# Patient Record
Sex: MALE | Race: WHITE | Employment: OTHER | URBAN - METROPOLITAN AREA
[De-identification: names, ages, dates, MRNs, and addresses within clinical notes are randomized per-mention and may not be internally consistent; named-entity substitution may affect disease eponyms.]

---

## 2016-01-05 LAB — HBA1C MFR BLD: 8.2 % (ref 0–5.7)

## 2017-01-01 ENCOUNTER — TELEPHONE (OUTPATIENT)
Dept: PALLIATIVE MEDICINE | Facility: CLINIC | Age: 62
End: 2017-01-01

## 2017-01-02 NOTE — TELEPHONE ENCOUNTER
Pre-screening questions for Radiology Injections:    Injection to be done at which interventional clinic site? [facility]    Procedure ordered by Dr. ABELARDO PATEL    Procedure ordered? Lumbar Epidural Steroid Injection    What insurance would patient like us to bill for this procedure? MEDICARE/MEDICA      Worker's comp-Any injection DO NOT SCHEDULE and route to Ita Erickson.      [employer] insurance - If scheduling an SI joint injection DO NOT SCHEDULE and route Ita Erickson.    [employer]- MBB's must be scheduled at LEAST two weeks apart      [employer] - Any injection besides hip/shoulder/knee joint DO NOT SCHEDULE and route to Ita Erickson. She will obtain PA and call pt back to schedule procedure or notify pt of denial.     Is an  needed? Yes -      Patient has a drive home? (mandatory) YES:      Is patient taking any blood thinners (plavix, coumadin, jantoven, warfarin, heparin, pradaxa or dabigatran )? No   (If so, do not schedule, contact RN and/or MD)     Is patient taking any aspirin products? No   (If more than 325mg/day do not schedule; Contact RN/MD. For all non-cervical interventional procedures if patient is taking MORE than 325mg/day, limit aspirin to 81-325mg/day x 1 week. No hold required day of procedure.  For CERVICAL procedures, hold all aspirin products for 6 days.)      Does the patient have a bleeding or clotting disorder? No   (If yes, okay to schedule, but contact RN/MD).  **For any patients with platelet count <100, must be forwarded to provider**    Is patient diabetic?  YES  If YES, have them bring their glucometer.    Does patient have an active infection or treated for one within the past week? No     Is patient currently taking any antibiotics?  No   For patients on chronic, preventative, or prophylactic antibiotics, procedures can be scheduled.   For patients on antibiotics for active or recent infection:  Malgorzata Mckenzie,  Kamla-antibiotic course must have been completed for 4 days  Derrell Sharma-antibiotic course must have been completed for 7 days    Is patient currently taking any steroid medications? (i.e. Prednisone, Medrol)  No   For patients on steroid medications:  Malgorzata Mckenzie, Kamla-steroid course must have been completed for 4 days  Derrell Sharma-steroid course must have been completed for 7 days  Review with patient:  If you are started on any steroids or antibiotics between now and your appointment, you must contact us because it may affect our ability to perform your procedure INFORMED    Is patient actively being treated for cancer or immunocompromised, including the spleen having been removed? No  **For Dr. Alves patients without spleens should have the chart sent to her**  (If YES, do NOT schedule and route to RN)    Are you able to get on and off an exam table with minimal or no assistance? Yes  (If NO, do NOT schedule and route to RN)  Are you able to roll over and lay on your stomach with minimal or no assistance? Yes  (If NO, do NOT schedule and route to RN)         Any allergies to contrast dye, iodine, shellfish, or numbing and steroid medications? No  (If so, inform nursing and note in scheduling comments.)    Allergies: Asa      Any chance of pregnancy?NO    Has the patient had a flu shot or any other vaccinations within 7 days before or after the procedure.  No       Does patient have an MRI/CT?  YES:   (SI joint, hip injections, lumbar sympathetic blocks, and stellate ganglion blocks do not require an MRI)    If so, was it done at [facility]? No      If not, where was it done? [facility], CANNOT REMEMBER NAME OF CLINIC - HAS MRI DISC WILL BRING WITH TO APPT     Was the MRI done w/in the last 3 years?  Yes   If MRI was not done at [facility], [facility] or [facility] do NOT schedule. Route to nursing.  (If pt has disc the injection can be scheduled but pt has to bring disc to appt. If  they show up w/out disc the injection cannot be done)    Reminders (please tell patient if applicable):       Instructed pt to arrive 30 minutes early for IV start if this is for a cervical procedure, ALL sympathetic (stellate ganglion, hypogastric, or lumbar sympathetic block) and all sedation procedures (RFA, spinal cord stimulation trials).      -IVs are not routinely placed for Mcgarry and [place] cervical case       If NPO for sedation, informed patient that it is okay to take medications with sips of water (except if they are to hold blood thinners).     *DO take blood pressure medication if it is prescribed*      If this is for a cervical SARA, informed patient that aspirin needs to be held for 6 days.         Do not schedule procedures requiring IV placement in the first appointment of the day or first appointment after lunch         For patients 85 or older we recommend having an adult stay w/ them for the remainder of the day.         Does the patient have any questions?        Patience Bush  [facility]

## 2017-01-03 ENCOUNTER — MEDICAL CORRESPONDENCE (OUTPATIENT)
Dept: HEALTH INFORMATION MANAGEMENT | Facility: CLINIC | Age: 62
End: 2017-01-03

## 2017-01-04 ENCOUNTER — OFFICE VISIT (OUTPATIENT)
Dept: FAMILY MEDICINE | Facility: CLINIC | Age: 62
End: 2017-01-04
Payer: MEDICARE

## 2017-01-04 VITALS
DIASTOLIC BLOOD PRESSURE: 72 MMHG | BODY MASS INDEX: 36.94 KG/M2 | OXYGEN SATURATION: 96 % | TEMPERATURE: 97.6 F | WEIGHT: 258 LBS | HEART RATE: 72 BPM | RESPIRATION RATE: 16 BRPM | HEIGHT: 70 IN | SYSTOLIC BLOOD PRESSURE: 136 MMHG

## 2017-01-04 DIAGNOSIS — N18.30 TYPE 2 DIABETES MELLITUS WITH STAGE 3 CHRONIC KIDNEY DISEASE, WITH LONG-TERM CURRENT USE OF INSULIN (H): ICD-10-CM

## 2017-01-04 DIAGNOSIS — M54.16 LUMBAR RADICULOPATHY: ICD-10-CM

## 2017-01-04 DIAGNOSIS — Z79.4 TYPE 2 DIABETES MELLITUS WITH STAGE 3 CHRONIC KIDNEY DISEASE, WITH LONG-TERM CURRENT USE OF INSULIN (H): ICD-10-CM

## 2017-01-04 DIAGNOSIS — M47.897 OTHER SPONDYLOSIS, LUMBOSACRAL REGION: ICD-10-CM

## 2017-01-04 DIAGNOSIS — E78.5 HYPERLIPIDEMIA LDL GOAL <100: ICD-10-CM

## 2017-01-04 DIAGNOSIS — G89.4 CHRONIC PAIN SYNDROME: Primary | ICD-10-CM

## 2017-01-04 DIAGNOSIS — M51.369 DDD (DEGENERATIVE DISC DISEASE), LUMBAR: ICD-10-CM

## 2017-01-04 DIAGNOSIS — E11.22 TYPE 2 DIABETES MELLITUS WITH STAGE 3 CHRONIC KIDNEY DISEASE, WITH LONG-TERM CURRENT USE OF INSULIN (H): ICD-10-CM

## 2017-01-04 DIAGNOSIS — M48.061 LUMBAR STENOSIS: ICD-10-CM

## 2017-01-04 DIAGNOSIS — Z11.59 NEED FOR HEPATITIS C SCREENING TEST: ICD-10-CM

## 2017-01-04 DIAGNOSIS — E55.9 VITAMIN D DEFICIENCY: ICD-10-CM

## 2017-01-04 DIAGNOSIS — M47.819 FACET ARTHROPATHY: ICD-10-CM

## 2017-01-04 PROBLEM — F15.10 METHAMPHETAMINE ABUSE (H): Status: ACTIVE | Noted: 2017-01-04

## 2017-01-04 PROBLEM — M47.816 OSTEOARTHRITIS OF LUMBAR SPINE, UNSPECIFIED SPINAL OSTEOARTHRITIS COMPLICATION STATUS: Status: ACTIVE | Noted: 2017-01-04

## 2017-01-04 PROBLEM — M47.27 OSTEOARTHRITIS OF SPINE WITH RADICULOPATHY, LUMBOSACRAL REGION: Status: ACTIVE | Noted: 2017-01-04

## 2017-01-04 LAB
AMPHETAMINES UR QL: ABNORMAL
BARBITURATES UR QL: ABNORMAL
BENZODIAZ UR QL: ABNORMAL
CANNABINOIDS UR QL: ABNORMAL
CHOLEST SERPL-MCNC: 162 MG/DL
COCAINE UR QL: ABNORMAL
DEPRECATED CALCIDIOL+CALCIFEROL SERPL-MC: 54 UG/L (ref 20–75)
HBA1C MFR BLD: 7.8 % (ref 4.3–6)
HCV AB SERPL QL IA: NORMAL
HDLC SERPL-MCNC: 50 MG/DL
LDLC SERPL CALC-MCNC: 73 MG/DL
MDA UR QL SCN: ABNORMAL
METHADONE UR QL SCN: ABNORMAL
METHAMPHET UR QL SCN: ABNORMAL
NONHDLC SERPL-MCNC: 112 MG/DL
OPIATES UR QL SCN: ABNORMAL
OXYCODONE UR QL: ABNORMAL
PCP UR QL SCN: ABNORMAL
TRICYCLICS UR QL SCN: ABNORMAL
TRIGL SERPL-MCNC: 197 MG/DL

## 2017-01-04 PROCEDURE — 80061 LIPID PANEL: CPT | Performed by: FAMILY MEDICINE

## 2017-01-04 PROCEDURE — 99000 SPECIMEN HANDLING OFFICE-LAB: CPT | Performed by: FAMILY MEDICINE

## 2017-01-04 PROCEDURE — 80307 DRUG TEST PRSMV CHEM ANLYZR: CPT | Mod: 90 | Performed by: FAMILY MEDICINE

## 2017-01-04 PROCEDURE — 36415 COLL VENOUS BLD VENIPUNCTURE: CPT | Performed by: FAMILY MEDICINE

## 2017-01-04 PROCEDURE — 82306 VITAMIN D 25 HYDROXY: CPT | Performed by: FAMILY MEDICINE

## 2017-01-04 PROCEDURE — 86803 HEPATITIS C AB TEST: CPT | Performed by: FAMILY MEDICINE

## 2017-01-04 PROCEDURE — 99214 OFFICE O/P EST MOD 30 MIN: CPT | Performed by: FAMILY MEDICINE

## 2017-01-04 PROCEDURE — 83036 HEMOGLOBIN GLYCOSYLATED A1C: CPT | Performed by: FAMILY MEDICINE

## 2017-01-04 RX ORDER — CYCLOBENZAPRINE HCL 10 MG
5-10 TABLET ORAL 3 TIMES DAILY PRN
Qty: 30 TABLET | Refills: 1 | Status: SHIPPED | OUTPATIENT
Start: 2017-01-04 | End: 2017-05-31

## 2017-01-04 RX ORDER — TRAMADOL HYDROCHLORIDE 50 MG/1
50-100 TABLET ORAL EVERY 6 HOURS PRN
Qty: 120 TABLET | Refills: 0 | Status: SHIPPED | OUTPATIENT
Start: 2017-01-04 | End: 2017-02-02

## 2017-01-04 RX ORDER — LIDOCAINE/PRILOCAINE 2.5 %-2.5%
CREAM (GRAM) TOPICAL PRN
Qty: 5800 G | Refills: 1 | Status: SHIPPED | OUTPATIENT
Start: 2017-01-04 | End: 2017-09-12

## 2017-01-04 RX ORDER — TRAMADOL HYDROCHLORIDE 50 MG/1
50-100 TABLET ORAL EVERY 6 HOURS PRN
Qty: 120 TABLET | Refills: 0 | Status: SHIPPED | OUTPATIENT
Start: 2017-01-04 | End: 2017-01-04

## 2017-01-04 RX ORDER — PREGABALIN 150 MG/1
150 CAPSULE ORAL 2 TIMES DAILY
Qty: 60 CAPSULE | Refills: 1 | Status: SHIPPED | OUTPATIENT
Start: 2017-01-04 | End: 2017-03-15

## 2017-01-04 RX ORDER — TRAMADOL HYDROCHLORIDE 50 MG/1
50 TABLET ORAL EVERY 6 HOURS PRN
Qty: 28 TABLET | Refills: 0 | Status: CANCELLED | OUTPATIENT
Start: 2017-01-04

## 2017-01-04 RX ORDER — GLIMEPIRIDE 2 MG/1
TABLET ORAL
Qty: 135 TABLET | Refills: 1 | Status: SHIPPED | OUTPATIENT
Start: 2017-01-04 | End: 2017-01-24

## 2017-01-04 RX ORDER — ATORVASTATIN CALCIUM 10 MG/1
10 TABLET, FILM COATED ORAL DAILY
Qty: 90 TABLET | Refills: 3 | Status: SHIPPED | OUTPATIENT
Start: 2017-01-04 | End: 2017-03-23

## 2017-01-04 RX ORDER — ATORVASTATIN CALCIUM 10 MG/1
10 TABLET, FILM COATED ORAL DAILY
Qty: 30 TABLET | Refills: 1 | Status: SHIPPED | OUTPATIENT
Start: 2017-01-04 | End: 2017-01-04

## 2017-01-04 ASSESSMENT — ANXIETY QUESTIONNAIRES
5. BEING SO RESTLESS THAT IT IS HARD TO SIT STILL: NOT AT ALL
3. WORRYING TOO MUCH ABOUT DIFFERENT THINGS: SEVERAL DAYS
IF YOU CHECKED OFF ANY PROBLEMS ON THIS QUESTIONNAIRE, HOW DIFFICULT HAVE THESE PROBLEMS MADE IT FOR YOU TO DO YOUR WORK, TAKE CARE OF THINGS AT HOME, OR GET ALONG WITH OTHER PEOPLE: NOT DIFFICULT AT ALL
2. NOT BEING ABLE TO STOP OR CONTROL WORRYING: NOT AT ALL
7. FEELING AFRAID AS IF SOMETHING AWFUL MIGHT HAPPEN: NOT AT ALL
GAD7 TOTAL SCORE: 2
1. FEELING NERVOUS, ANXIOUS, OR ON EDGE: SEVERAL DAYS
6. BECOMING EASILY ANNOYED OR IRRITABLE: NOT AT ALL

## 2017-01-04 ASSESSMENT — PATIENT HEALTH QUESTIONNAIRE - PHQ9: 5. POOR APPETITE OR OVEREATING: NOT AT ALL

## 2017-01-04 NOTE — PATIENT INSTRUCTIONS
[facility]    If you have any questions regarding to your visit please contact your care team:       Team Red:   Clinic Hours Telephone Number   Dr. Twyla Lucio  (pediatrics)  Paula Hutson NP 7am-7pm  Monday - Thursday   7am-5pm  Fridays  [phone] (fax)    Luis Alberto KAUR  [phone]   Urgent Care - [place] and [place] Monday-Friday  [place] - 11am-8pm  Saturday-Sunday  Both sites - 9am-5pm  [phone] - [facility]  [phone] - [place]       What options do I have for visits at the clinic other than the traditional office visit?  To expand how we care for you, many of our providers are utilizing electronic visits (e-visits) and telephone visits, when medically appropriate, for interactions with their patients rather than a visit in the clinic.   We also offer nurse visits for many medical concerns. Just like any other service, we will bill your insurance company for this type of visit based on time spent on the phone with your provider. Not all insurance companies cover these visits. Please check with your medical insurance if this type of visit is covered. You will be responsible for any charges that are not paid by your insurance.      E-visits via [employer]:  generally incur a $35.00 fee.  Telephone visits:  Time spent on the phone: *charged based on time that is spent on the phone in increments of 10 minutes. Estimated cost:   5-10 mins $30.00   11-20 mins. $59.00   21-30 mins. $85.00     As always, Thank you for trusting us with your health care needs!        Discharge JANELL Todd CMA

## 2017-01-04 NOTE — Clinical Note
[facility]    01/04/2017    Patient: Sandra Dos Santos  YOB: 1955  Medical Record Number: [id number]    Controlled Substance Agreement  I understand that my care provider has prescribed controlled substances (narcotics, tranquilizers, and/or stimulants) to help manage my condition(s).  I am taking this medicine to help me function or work.  I know that this is strong medicine.  It could have serious side effects and even cause a dependency on the drug.  If I stop these medicines suddenly, I could have severe withdrawal symptoms.    The risks, benefits, and side effects of these medication(s) were explained to me.  I agree that:  1. I will take part in other treatments as advised by my provider.  This may be psychiatry or counseling, physical therapy, behavioral therapy, group treatment, or a referral to a pain clinic.  I will reduce or stop my medicine when my provider tells me to do so.   2. I will take my medicines as prescribed.  I will not change the dose or schedule unless my provider tells me to.  There will be no refills if I  run out early.   I may be contacted at any time without warning and asked to complete a drug test or pill count.   3. I will keep all my appointments at the clinic.  If I miss appointments or fail to follow instructions, my provider may stop my medicine.  4. I will not ask other providers to prescribe controlled substances. And I will not accept controlled substances from other people. If I need another prescribed controlled substance for a new reason, I will notify my provider within one business day.  5. If I enroll in the [place] Medical Marijuana program, I will tell my provider.  I will also sign an agreement to share my medical records with my provider.  6. I will use one pharmacy to fill all of my controlled substance prescriptions.  If my prescription is mailed to my pharmacy, it may take 5 to 7 days for my medicine to be ready.  7. I understand that  my provider, clinic care team, and pharmacy can track controlled substance prescriptions from other providers through a central database (prescription monitoring program).  8. I will bring in my list of medications (or my medicine bottles) each time I come to the clinic.  Page 1 of 2      [facility]    01/04/2017    Patient: Sandra Dos Santos  YOB: 1955  Medical Record Number: [id number]    9. Refills of controlled substances will be made only during office hours.  It is up to me to make sure that I do not run out of my medicines on weekends or holidays.    10. I am responsible for my prescriptions.  If the medicine is lost or stolen, it will not be replaced.   I also agree not to share these medicines with anyone.  11. I agree to not use ANY illegal or recreational drugs.  This includes marijuana, cocaine, bath salts or other drugs.  I agree not to use alcohol unless my provider says I may.  I agree to give urine samples whenever asked.  If I fail to give a urine sample, the provider may stop my medicine.     12. I will tell my nurse or provider right away if I become pregnant or have a new medical problem treated outside of [facility].  13. I understand that this medicine can affect my thinking and judgment.  It may be unsafe for me to drive, use machinery and do dangerous tasks.  I will not do any of these things until I know how the medicine affects me.  If my dose changes, I will wait to see how it affects me.  I will contact my provider if I have concerns about medicine side effects.  I understand that if I do not follow any of the conditions above, my prescriptions or treatment may be stopped.    I agree that my provider, clinic care team, and pharmacy may work with any city, state or federal law enforcement agency that investigates the misuse, sale, or other diversion of my controlled medicine. I will allow my provider to discuss my care with or share a copy of this agreement with  any other treating provider, pharmacy or emergency room where I receive care.  I agree to give up (waive) any right of privacy or confidentiality with respect to these authorizations.   Pt will get 120 tablets Ultram /month  I have read this agreement and have asked questions about anything I did not understand.   ___________________________________    ___________________________  Patient Signature                                                             Date and Time  ___________________________________     ____________________________  Witness                                                                              Date and Time  ___________________________________  Deborah Leslie MD  Page 2 of 2  Opioid Pain Medicines (also known as Narcotics)  What You Need to Know      What are opioids?   Opioids are pain medicines that must be prescribed by a doctor. Examples are:     morphine (MS Contin, Nicolle)    oxycodone (Oxycontin)    oxycodone and acetaminophen (Percocet)    hydrocodone and acetaminophen (Vicodin, Norco)     fentanyl patch (Duragesic)     hydromorphone (Dilaudid)     methadone     What do opioids do well?   Opioids are best for short-term pain after a surgery or injury. They also work well for cancer pain. Unlike other pain medicines, they do not cause liver or kidney failure or ulcers. They may help some people with long-lasting (chronic) pain.     What do opioids NOT do well?   Opioids never get rid of pain entirely, and they do not work well for most patients with chronic pain. Opioids do not reduce swelling, one of the causes of pain. They also don t work well for nerve pain.     Side effects  Talk to your doctor before you start or decide to keep taking one of these medicines. Side effects include:    Lowers your breathing rate enough that it could cause death    Death due to taking more than the prescribed dose    Serious lifelong opioid use      Dependence is not the same as addiction.  Addiction is when people keep using a substance that harms their body, their mind or their relations with others. If you have a history of drug or alcohol abuse, taking opioids can cause a relapse.  Over time, opioids don t work as well. Most people will need higher and higher doses. The higher the dose, the more serious the side effects. We don t know the long-term effects of opioids.   People who have used opioids for a long time have a lower quality of life, worse depression, higher levels of pain and more visits to doctors.  Overdose from prescription drugs is the second leading cause of death in the [place] The risk of overdose rises when opioids are taken with other drugs such as:    Medicines used for anxiety and panic attacks (such as lorazepam, alprazolam, clonazepam    Other sedatives    Alcohol    Illegal drugs such as heroin  Never share your opioids with others. Be sure to store opioids in a secure place, locked if possible.Young children can easily swallow them and overdose.     Are there other ways to manage pain?  Ways to help reduce pain:    Exercise every day.    Treat health problems that may be causing pain.    Treat mental health problems like depression and anxiety.     Worse depression symptoms; Less pleasure in things you usually enjoy    Feeling tired or sluggish    Slower thoughts or cloudy thinking    Being more sensitive to pain over time; Pain is harder to control.    Trouble sleeping or restless sleep    Changes in hormone levels (for example, less testosterone).     Changes in sex drive or ability to have sex    Long lasting nausea and constipation    Trouble breathing while asleep; This is worse with lung problems like COPD or sleep apnea.    Unsafe driving    Getting sick more often    Itching    Feeling dizzy    Dry mouth    Sweating    Trouble emptying the bladder (peeing). This is worse if you have an enlarged prostate or get urinary tract infections (UTIs).    What else should I  know about opioids?  When someone takes opioids for too long or too often, they become dependent. This means that if you stop or reduce the medicine too quickly, you will have withdrawal symptoms.          Practice good sleep habits.  Try to go to bed and get up at the same time every day.    Stop smoking.  Tobacco use can make pain worse.    Do things that you enjoy.    Find a way to work through pain without drugs.  Try deep breathing, meditation, visual imagery and aromatherapy.    Ask your doctor to help you create a plan to manage your pain.

## 2017-01-04 NOTE — NURSING NOTE
"Chief Complaint   Patient presents with     Recheck Medication       Initial /72 mmHg  Pulse 72  Temp(Src) 97.6  F (36.4  C)  Resp 16  Ht 5' 9.5\" (1.765 m)  Wt 258 lb (117.028 kg)  BMI 37.57 kg/m2  SpO2 96% Estimated body mass index is 37.57 kg/(m^2) as calculated from the following:    Height as of this encounter: 5' 9.5\" (1.765 m).    Weight as of this encounter: 258 lb (117.028 kg).  BP completed using cuff size: large left arm    Judi Todd. MA      "

## 2017-01-04 NOTE — Clinical Note
[address] [place] [address]    January 9, 2017    Sandra Dos Santos  [address] [place] [id number]          Dear Sandra,    The toxicology screen is normal for the medication your are taking.    Enclosed is a copy of your results.     Results for orders placed or performed in visit on 01/04/17   Hepatitis C Screen Reflex to HCV RNA Quant and Genotype   Result Value Ref Range    Hepatitis C Antibody  NR     Nonreactive   Assay performance characteristics have not been established for newborns,   infants, and children     Hemoglobin A1c   Result Value Ref Range    Hemoglobin A1C 7.8 (H) 4.3 - 6.0 %   Drug abuse screen, urine (Pain Care Package)   Result Value Ref Range    Methamphetamine Qual Urine (A) NEG     Positive   Cutoff for a positive methamphetamine is greater than 1000 ng/mL. This is an   unconfirmed screening result to be used for medical purposes only.      Cocaine Qual Urine  NEG     Negative   Cutoff for a negative cocaine is 300 ng/mL or less.      Cannabinoids Qual Urine  NEG     Negative   Cutoff for a negative cannabinoid is 50 ng/mL or less.      MDMA Qual Urine  NEG     Negative   Cutoff for a negative MDMA (ecstasy) is 500 ng/mL or less.      Methadone Qual Urine  NEG     Negative   Cutoff for a negative methadone is 300 ng/mL or less.      Opiates Qualitative Urine  NEG     Negative   Cutoff for a negative opiate is 300 ng/mL or less.      Benzodiazepine Qual Urine  NEG     Negative   Cutoff for a negative benzodiazepine is 300 ng/mL or less.      Tricyc Anti Qual Urine  NEG     Negative   Cutoff for a negative tricyclic antidepressant is 1000 ng/mL or less.      Barbiturates Qual Urine  NEG     Negative   Cutoff for a negative barbituate is 300 ng/mL or less.      PCP Qual Urine  NEG     Negative   Cutoff for a negative PCP is 25 ng/mL or less.      Amphetamine Qual Urine  NEG      Negative   Cutoff for a negative amphetamine is 1000 ng/mL or less.      Oxycodone Qual Urine  NEG     Negative   Cutoff for a negative Oxycodone is 100 ng/mL or less.     Lipid panel reflex to direct LDL   Result Value Ref Range    Cholesterol 162 <200 mg/dL    Triglycerides 197 (H) <150 mg/dL    HDL Cholesterol 50 >39 mg/dL    LDL Cholesterol Calculated 73 <100 mg/dL    Non HDL Cholesterol 112 <130 mg/dL   Vitamin D Deficiency   Result Value Ref Range    Vitamin D Deficiency screening 54 20 - 75 ug/L   Drug Screen Comprehensive , Urine with Reported Meds (Pain Care Package)   Result Value Ref Range    Pain Drug SCR UR W RPTD Meds       FINAL  (Note)  ====================================================================  [facility] COMP DRUG ANALYSIS,UR  ====================================================================  Test                             Result       Flag       Units  Drug Present and Declared for Prescription Verification   Tramadol                       PRESENT      EXPECTED   O-Desmethyltramadol            PRESENT      EXPECTED   N-Desmethyltramadol            PRESENT      EXPECTED    Source of tramadol is a prescription medication.    O-desmethyltramadol and N-desmethyltramadol are expected    metabolites of tramadol.      Drug Present not Declared for Prescription Verification   Pregabalin                     PRESENT      UNEXPECTED   Lidocaine                      PRESENT      UNEXPECTED  ====================================================================  Test                      Result    Flag   Units      Ref Range   Creatinine              143              mg/dL      >=20  ================= ===================================================  Declared Medications:  The flagging and interpretation on this report are based on the  following declared medications.  Unexpected results may arise from  inaccuracies in the declared medications.    **Note: The testing scope of this panel  includes these medications:    Tramadol  ====================================================================  For clinical consultation, please call [phone].  ====================================================================  Analysis performed by [facility] [place] [address]         If you have any questions or concerns, please call myself or my nurse at [phone].      Sincerely,        Deborah Leslie MD/ ARTURO

## 2017-01-04 NOTE — PROGRESS NOTES
SUBJECTIVE:                                                    Sandra Dos Santos is a 61 year old male who presents to clinic today for the following health issues:        Diabetes Follow-up    Patient is checking blood sugars: three times daily. Results are as follows:         am - 114              postprandial after lunch- varies         suppertime - varies    Diabetic concerns: None     Symptoms of hypoglycemia (low blood sugar): none     Paresthesias (numbness or burning in feet) or sores: Yes      Date of last diabetic eye exam: 2016     Hyperlipidemia Follow-Up      Rate your low fat/cholesterol diet?: good    Taking statin?  Yes, no muscle aches from statin    Other lipid medications/supplements?:  none     Hypertension Follow-up      Outpatient blood pressures are not being checked.    Low Salt Diet: no added salt         Amount of exercise or physical activity: None    Problems taking medications regularly: No    Medication side effects: see above    Diet: diabetic      Chronic Pain Follow-Up       Type / Location of Pain: lower back  Analgesia/pain control:       Recent changes:  worse      Overall control: Tolerable with discomfort  Activity level/function:      Daily activities:  Can do most things most days, with some rest    Work:  Unable to work  Adverse effects:  No  Adherance    Taking medication as directed?  Yes    Participating in other treatments: yes  Risk Factors:    Sleep:  Fair    Mood/anxiety:  controlled    Recent family or social stressors:  none noted    Other aggravating factors: none  PHQ-9 SCORE 9/15/2010 12/30/2016   Total Score 11 -   Total Score - 0     No flowsheet data found.  Encounter-Level CSA:     There are no encounter-level csa.             Problem list and histories reviewed & adjusted, as indicated.  Additional history: as documented    Patient Active Problem List   Diagnosis     HYPERLIPIDEMIA LDL GOAL <100     Chronic kidney disease, stage III (moderate)      Gastroesophageal reflux disease     Hypothyroidism     Urinary hesitancy     Type 2 diabetes mellitus with stage 3 chronic kidney disease, with long-term current use of insulin (H)     Chronic pain syndrome     Lumbago     Morbid obesity due to excess calories (H)     Intermittent asthma, uncomplicated     Benign prostatic hyperplasia, presence of lower urinary tract symptoms unspecified, unspecified morphology     Gastroesophageal reflux disease without esophagitis     Hypertension goal BP (blood pressure) < 140/90     Lumbar stenosis     DDD (degenerative disc disease), lumbar     Lumbar radiculopathy     Osteoarthritis of lumbar spine, unspecified spinal osteoarthritis complication status     Facet arthropathy (H)     Osteoarthritis of spine with radiculopathy, lumbosacral region     Other spondylosis, lumbosacral region     Past Surgical History   Procedure Laterality Date     Joint replacemtn, knee rt/lt  2013  ,2012     Joint Replacement knee RT/LT     Arthroscopy shoulder rt/lt Right        Social History   Substance Use Topics     Smoking status: Never Smoker      Smokeless tobacco: Never Used     Alcohol Use: No     Family History   Problem Relation Age of Onset     DIABETES Father      Prostate Cancer No family hx of      Colon Cancer No family hx of      Breast Cancer No family hx of          Current Outpatient Prescriptions   Medication Sig Dispense Refill     pregabalin (LYRICA) 150 MG capsule Take 1 capsule (150 mg) by mouth 2 times daily 60 capsule 1     glimepiride (AMARYL) 2 MG tablet 1 1/2 tablets daily with food 135 tablet 1     lidocaine-prilocaine (EMLA) cream Apply topically as needed for moderate pain 5800 g 1     traMADol (ULTRAM) 50 MG tablet Take 1-2 tablets ( mg) by mouth every 6 hours as needed for pain maximum 4 tablet(s) per day 120 tablet 0     atorvastatin (LIPITOR) 10 MG tablet Take 1 tablet (10 mg) by mouth daily 90 tablet 3     insulin glargine U-300 (TOUJEO SOLOSTAR) 300  UNIT/ML injection Inject 14 Units Subcutaneous At Bedtime 3 Month 1     cyclobenzaprine (FLEXERIL) 10 MG tablet Take 0.5-1 tablets (5-10 mg) by mouth 3 times daily as needed for muscle spasms 30 tablet 1     INVOKANA 100 MG tablet Take 1 tablet (100 mg) by mouth every morning (before breakfast) 90 tablet 1     tamsulosin (FLOMAX) 0.4 MG capsule Take 1 capsule (0.4 mg) by mouth daily 90 capsule 3     ranitidine (ZANTAC) 150 MG tablet Take 1 tablet (150 mg) by mouth 2 times daily 180 tablet 1     albuterol (PROAIR HFA/PROVENTIL HFA/VENTOLIN HFA) 108 (90 BASE) MCG/ACT Inhaler Inhale 2 puffs into the lungs every 4 hours as needed for shortness of breath / dyspnea or wheezing 1 Inhaler 11     order for DME Test strips for pt's glucometer, brand as covered by insurance. Test four times daily and prn. 400 each 4     losartan (COZAAR) 25 MG tablet Take 1 tablet (25 mg) by mouth daily 90 tablet 3     GLOBAL EASE INJECT PEN NEEDLES 31G X 5 MM   3     ACCU-CHEK ULISES PLUS test strip   2     cholecalciferol (VITAMIN D3) 5000 UNITS TABS tablet Take by mouth daily       thiamine 100 MG tablet Take 100 mg by mouth       cyclobenzaprine (FLEXERIL) 10 MG tablet Take 0.5-1 tablets (5-10 mg) by mouth 3 times daily as needed for muscle spasms 30 tablet 1     [DISCONTINUED] atorvastatin (LIPITOR) 10 MG tablet Take 1 tablet (10 mg) by mouth daily 30 tablet 1     [DISCONTINUED] atorvastatin (LIPITOR) 10 MG tablet Take 1 tablet (10 mg) by mouth daily 30 tablet 1     [DISCONTINUED] pregabalin (LYRICA) 150 MG capsule Take 1 capsule (150 mg) by mouth 2 times daily 60 capsule 1     [DISCONTINUED] insulin glargine U-300 (TOUJEO SOLOSTAR) 300 UNIT/ML injection Inject 12 Units Subcutaneous At Bedtime 3 Month 1     [DISCONTINUED] glimepiride (AMARYL) 2 MG tablet 1 1/2 tablets daily with food 135 tablet 1     Allergies   Allergen Reactions     Asa [Aspirin] Other (See Comments)     dizzy     Recent Labs   Lab Test  01/04/17   0757  11/29/16   1101   "10/15/12   1108  09/15/10   1232   A1C  7.8*  8.1*   --   7.3*   LDL   --   122*   --   77   HDL   --   45   --   51   TRIG   --   226*   --   167*   ALT   --   26  18   --    CR   --   1.22  0.80   --    GFRESTIMATED   --   60*  >90   --    GFRESTBLACK   --   73  >90   --    POTASSIUM   --   4.6  4.2   --    TSH   --   2.58  1.16   --       BP Readings from Last 3 Encounters:   01/04/17 136/72   12/30/16 150/78   12/02/16 133/78    Wt Readings from Last 3 Encounters:   01/04/17 258 lb (117.028 kg)   12/30/16 254 lb (115.214 kg)   12/02/16 252 lb (114.306 kg)                  Labs reviewed in EPIC  Problem list, Medication list, Allergies, and Medical/Social/Surgical histories reviewed in [facility] and updated as appropriate.    ROS:  C: NEGATIVE for fever, chills, change in weight  E/M: NEGATIVE for ear, mouth and throat problems  R: NEGATIVE for significant cough or SOB  CV: NEGATIVE for chest pain, palpitations or peripheral edema  MUSCULOSKELETAL: chronic pain syndrome  NEURO: has pain going down legs    OBJECTIVE:                                                    /72 mmHg  Pulse 72  Temp(Src) 97.6  F (36.4  C)  Resp 16  Ht 5' 9.5\" (1.765 m)  Wt 258 lb (117.028 kg)  BMI 37.57 kg/m2  SpO2 96%  Body mass index is 37.57 kg/(m^2).  GENERAL: alert, no distress and over weight  NECK: no adenopathy, no asymmetry, masses, or scars and thyroid normal to palpation  RESP: lungs clear to auscultation - no rales, rhonchi or wheezes  CV: regular rate and rhythm, normal S1 S2, no S3 or S4, no murmur, click or rub, no peripheral edema and peripheral pulses strong  ABDOMEN: soft, nontender, no hepatosplenomegaly, no masses and bowel sounds normal  MS: no gross musculoskeletal defects noted, no edema    Diagnostic Test Results:  Results for orders placed or performed in visit on 01/04/17 (from the past 24 hour(s))   Hemoglobin A1c   Result Value Ref Range    Hemoglobin A1C 7.8 (H) 4.3 - 6.0 %        ASSESSMENT/PLAN:      " "                                                  BMI:   Estimated body mass index is 37.57 kg/(m^2) as calculated from the following:    Height as of this encounter: 5' 9.5\" (1.765 m).    Weight as of this encounter: 258 lb (117.028 kg).   Weight management plan: low abigail diet      1. Chronic pain syndrome  Pain clinic notes reviewed   - pregabalin (LYRICA) 150 MG capsule; Take 1 capsule (150 mg) by mouth 2 times daily  Dispense: 60 capsule; Refill: 1  - Drug abuse screen, urine (Pain Care Package)  - traMADol (ULTRAM) 50 MG tablet; Take 1-2 tablets ( mg) by mouth every 6 hours as needed for pain maximum 4 tablet(s) per day  Dispense: 120 tablet; Refill: 0  - cyclobenzaprine (FLEXERIL) 10 MG tablet; Take 0.5-1 tablets (5-10 mg) by mouth 3 times daily as needed for muscle spasms  Dispense: 30 tablet; Refill: 1  Advised patient since he is seeing Pain clinic-he should get ongoing pain meds from pain clinic  2. Hyperlipidemia LDL goal <100  Will check  - Lipid panel reflex to direct LDL  - atorvastatin (LIPITOR) 10 MG tablet; Take 1 tablet (10 mg) by mouth daily  Dispense: 90 tablet; Refill: 3    3. Type 2 diabetes mellitus with stage 3 chronic kidney disease, with long-term current use of insulin (H)  Better  Advised strict Diabetic diet  Refills done  - glimepiride (AMARYL) 2 MG tablet; 1 1/2 tablets daily with food  Dispense: 135 tablet; Refill: 1  - Hemoglobin A1c  - insulin glargine U-300 (TOUJEO SOLOSTAR) 300 UNIT/ML injection; Inject 14 Units Subcutaneous At Bedtime  Dispense: 3 Month; Refill: 1    4. Lumbar stenosis  Managed by pain clinic    5. DDD (degenerative disc disease), lumbar  As above    6. Lumbar radiculopathy  As above    7. Facet arthropathy (H)  As above    8. Need for hepatitis C screening test  Advised   - Hepatitis C Screen Reflex to HCV RNA Quant and Genotype    9. Other spondylosis, lumbosacral region      10. Vitamin D deficiency  Will check  - Vitamin D Deficiency    Follow up 3 " months  Pain meds to be done by pain clinic as pt is being seen There  I have done It this Time    Deborah Leslie MD  [facility] [place]  DIRE Score for ongoing opioid management is calculated as follows:    Diagnosis = 2    Intractability = 2    Risk: Psych = 3  Chem Hlth = 3  Reliability = 3  Social = 3    Efficacy = 2    Total DIRE Score = 18 (14 or higher predicts good candidate for ongoing opioid management; 13 or lower predicts poor candidate for opioid management)

## 2017-01-04 NOTE — PROGRESS NOTES
"  SUBJECTIVE:                                                    Sandra NICK Dos Santos is a 61 year old male who presents to clinic today for the following health issues:  {Provider please address medication reconciliation discrepancies--rooming staff please delete if no med/rec issues}    {Chronic Problem SUPERLIST:873088}    Amount of exercise or physical activity: {Exercise frequency days per week:847715}    Problems taking medications regularly: {Med Problems:966783::\"No\"}    Medication side effects: {CHRONIC MED SIDE EFFECTS:260504::\"none\"}    Diet: { :628439}    {additional problems for provider to add:630819}    Problem list and histories reviewed & adjusted, as indicated.  Additional history: {NONE - AS DOCUMENTED:225809::\"as documented\"}    {HIST REVIEW/ LINKS 2:614195}    {PROVIDER CHARTING PREFERENCE:196085}    "

## 2017-01-04 NOTE — MR AVS SNAPSHOT
After Visit Summary   1/4/2017    Sandra Dos Santos    MRN: [id number]           Patient Information     Date Of Birth          1955        Visit Information        Provider Department      1/4/2017 7:30 AM Open, Assignments; Deborah Leslie MD [facility]        Today's Diagnoses     Chronic pain syndrome    -  1     Hyperlipidemia LDL goal <100         Type 2 diabetes mellitus with stage 3 chronic kidney disease, with long-term current use of insulin (H)         Lumbar stenosis         DDD (degenerative disc disease), lumbar         Lumbar radiculopathy         Facet arthropathy (H)         Need for hepatitis C screening test         Other spondylosis, lumbosacral region         Vitamin D deficiency           Care Instructions    [facility]    If you have any questions regarding to your visit please contact your care team:       Team Red:   Clinic Hours Telephone Number   Dr. Twyla Lucio  (pediatrics)  Paula Hutson NP 7am-7pm  Monday - Thursday   7am-5pm  Fridays  [phone] (fax)    Luis Alberto KAUR  [phone]   Urgent Care - [place] and [place] Monday-Friday  [place] - 11am-8pm  Saturday-Sunday  Both sites - 9am-5pm  [phone] - [facility]  [phone] - [place]       What options do I have for visits at the clinic other than the traditional office visit?  To expand how we care for you, many of our providers are utilizing electronic visits (e-visits) and telephone visits, when medically appropriate, for interactions with their patients rather than a visit in the clinic.   We also offer nurse visits for many medical concerns. Just like any other service, we will bill your insurance company for this type of visit based on time spent on the phone with your provider. Not all insurance companies cover these visits. Please check with your medical insurance if this type of visit is covered. You will be  "responsible for any charges that are not paid by your insurance.      E-visits via Hot[employer] Systemshart:  generally incur a $35.00 fee.  Telephone visits:  Time spent on the phone: *charged based on time that is spent on the phone in increments of 10 minutes. Estimated cost:   5-10 mins $30.00   11-20 mins. $59.00   21-30 mins. $85.00     As always, Thank you for trusting us with your health care needs!        Discharge JANELL Todd  [facility]              Follow-ups after your visit        Your next 10 appointments already scheduled     Estevan 10, 2017 10:45 AM   Radiology Injections with Simon Andrade MD   [facility] [place] ([facility])    [address] [address] [id number] [phone]            Jan 19, 2017 11:30 AM   Return Visit with Simon Andrade MD   [facility] [place] ([facility])    [address] [address] [id number] [phone]              Who to contact     If you have questions or need follow up information about today's clinic visit or your schedule please contact [facility] directly at [phone].  Normal or non-critical lab and imaging results will be communicated to you by HotDog Systemshart, letter or phone within 4 business days after the clinic has received the results. If you do not hear from us within 7 days, please contact the clinic through [employer] System[employer]art or phone. If you have a critical or abnormal lab result, we will notify you by phone as soon as possible.  Submit refill requests through Watch Over Me or call your pharmacy and they will forward the refill request to us. Please allow 3 business days for your refill to be completed.          Additional Information About Your Visit        Hot[employer] SystemsharSMATOOS Information     Watch Over Me lets you send messages to your doctor, view your test results, renew your prescriptions, schedule appointments and more. To sign up, go to [email or web address] . Click on \"Log in\" on the left " "side of the screen, which will take you to the Welcome page. Then click on \"Sign up Now\" on the right side of the page.     You will be asked to enter the access code listed below, as well as some personal information. Please follow the directions to create your username and password.     Your access code is: [email or web address]  Expires: 2017  1:53 PM     Your access code will  in 90 days. If you need help or a new code, please call your [place] clinic or [phone].        Care EveryWhere ID     This is your Care EveryWhere ID. This could be used by other organizations to access your [place] medical records  [id number]        Your Vitals Were     Pulse Temperature Respirations Height BMI (Body Mass Index) Pulse Oximetry    72 97.6  F (36.4  C) 16 5' 9.5\" (1.765 m) 37.57 kg/m2 96%       Blood Pressure from Last 3 Encounters:   17 136/72   16 150/78   16 133/78    Weight from Last 3 Encounters:   17 258 lb (117.028 kg)   16 254 lb (115.214 kg)   16 252 lb (114.306 kg)              We Performed the Following     Drug abuse screen, urine (Pain Care Package)     Hemoglobin A1c     Hepatitis C Screen Reflex to HCV RNA Quant and Genotype     Lipid panel reflex to direct LDL     Vitamin D Deficiency          Today's Medication Changes          These changes are accurate as of: 17  8:38 AM.  If you have any questions, ask your nurse or doctor.               Start taking these medicines.        Dose/Directions    atorvastatin 10 MG tablet   Commonly known as:  LIPITOR   Used for:  Hyperlipidemia LDL goal <100   Started by:  Deborah Leslie MD        Dose:  10 mg   Take 1 tablet (10 mg) by mouth daily   Quantity:  90 tablet   Refills:  3       lidocaine-prilocaine cream   Commonly known as:  EMLA   Started by:  Deborah Leslie MD        Apply topically as needed for moderate pain   Quantity:  5800 g   Refills:  1         These medicines have changed or have updated prescriptions.  "       Dose/Directions    * cyclobenzaprine 10 MG tablet   Commonly known as:  FLEXERIL   This may have changed:  Another medication with the same name was added. Make sure you understand how and when to take each.   Used for:  Chronic low back pain without sciatica, unspecified back pain laterality   Changed by:  Deborah Leslie MD        Dose:  5-10 mg   Take 0.5-1 tablets (5-10 mg) by mouth 3 times daily as needed for muscle spasms   Quantity:  30 tablet   Refills:  1       * cyclobenzaprine 10 MG tablet   Commonly known as:  FLEXERIL   This may have changed:  You were already taking a medication with the same name, and this prescription was added. Make sure you understand how and when to take each.   Used for:  Chronic pain syndrome   Changed by:  Deborah Leslie MD        Dose:  5-10 mg   Take 0.5-1 tablets (5-10 mg) by mouth 3 times daily as needed for muscle spasms   Quantity:  30 tablet   Refills:  1       insulin glargine U-300 300 UNIT/ML injection   Commonly known as:  TOUROBSON CAMP   This may have changed:  how much to take   Used for:  Type 2 diabetes mellitus with stage 3 chronic kidney disease, with long-term current use of insulin (H)   Changed by:  Deborah Leslie MD        Dose:  14 Units   Inject 14 Units Subcutaneous At Bedtime   Quantity:  3 Month   Refills:  1       traMADol 50 MG tablet   Commonly known as:  ULTRAM   This may have changed:    - how much to take  - reasons to take this  - additional instructions   Used for:  Chronic pain syndrome   Changed by:  Deborah Leslie MD        Dose:   mg   Take 1-2 tablets ( mg) by mouth every 6 hours as needed for pain maximum 4 tablet(s) per day   Quantity:  120 tablet   Refills:  0       * Notice:  This list has 2 medication(s) that are the same as other medications prescribed for you. Read the directions carefully, and ask your doctor or other care provider to review them with you.         Where to get your medicines      These medications  were sent to [facility] [place], [place] - [address]  [address], [place] [address]     Phone:  [phone]    - atorvastatin 10 MG tablet  - cyclobenzaprine 10 MG tablet      These medications were sent to [facility] [place], [place] - [address]  [address] [place] [address]     Phone:  [phone]    - glimepiride 2 MG tablet  - insulin glargine U-300 300 UNIT/ML injection  - lidocaine-prilocaine cream      Some of these will need a paper prescription and others can be bought over the counter.  Ask your nurse if you have questions.     Bring a paper prescription for each of these medications    - pregabalin 150 MG capsule  - traMADol 50 MG tablet             Primary Care Provider    Physician No Ref-Primary       No address on file        Thank you!     Thank you for choosing [facility]  for your care. Our goal is always to provide you with excellent care. Hearing back from our patients is one way we can continue to improve our services. Please take a few minutes to complete the written survey that you may receive in the mail after your visit with us. Thank you!             Your Updated Medication List - Protect others around you: Learn how to safely use, store and throw away your medicines at www.disposemymeds.org.          This list is accurate as of: 1/4/17  8:38 AM.  Always use your most recent med list.                   Brand Name Dispense Instructions for use    ACCU-CHEK ULISES PLUS test strip   Generic drug:  blood glucose monitoring          albuterol 108 (90 BASE) MCG/ACT Inhaler    PROAIR HFA/PROVENTIL HFA/VENTOLIN HFA    1 Inhaler    Inhale 2 puffs into the lungs every 4 hours as needed for shortness of breath / dyspnea or wheezing       atorvastatin 10 MG tablet    LIPITOR    90 tablet    Take 1 tablet (10 mg) by mouth daily       cholecalciferol 5000 UNITS Tabs tablet    vitamin D3     Take by mouth daily       *  cyclobenzaprine 10 MG tablet    FLEXERIL    30 tablet    Take 0.5-1 tablets (5-10 mg) by mouth 3 times daily as needed for muscle spasms       * cyclobenzaprine 10 MG tablet    FLEXERIL    30 tablet    Take 0.5-1 tablets (5-10 mg) by mouth 3 times daily as needed for muscle spasms       glimepiride 2 MG tablet    AMARYL    135 tablet    1 1/2 tablets daily with food       GLOBAL EASE INJECT PEN NEEDLES 31G X 5 MM   Generic drug:  insulin pen needle          insulin glargine U-300 300 UNIT/ML injection    TOUJEO SOLOSTAR    3 Month    Inject 14 Units Subcutaneous At Bedtime       INVOKANA 100 MG tablet   Generic drug:  canagliflozin     90 tablet    Take 1 tablet (100 mg) by mouth every morning (before breakfast)       lidocaine-prilocaine cream    EMLA    5800 g    Apply topically as needed for moderate pain       losartan 25 MG tablet    COZAAR    90 tablet    Take 1 tablet (25 mg) by mouth daily       order for DME     400 each    Test strips for pt's glucometer, brand as covered by insurance. Test four times daily and prn.       pregabalin 150 MG capsule    LYRICA    60 capsule    Take 1 capsule (150 mg) by mouth 2 times daily       ranitidine 150 MG tablet    ZANTAC    180 tablet    Take 1 tablet (150 mg) by mouth 2 times daily       tamsulosin 0.4 MG capsule    FLOMAX    90 capsule    Take 1 capsule (0.4 mg) by mouth daily       thiamine 100 MG tablet      Take 100 mg by mouth       traMADol 50 MG tablet    ULTRAM    120 tablet    Take 1-2 tablets ( mg) by mouth every 6 hours as needed for pain maximum 4 tablet(s) per day       * Notice:  This list has 2 medication(s) that are the same as other medications prescribed for you. Read the directions carefully, and ask your doctor or other care provider to review them with you.

## 2017-01-05 ASSESSMENT — ANXIETY QUESTIONNAIRES: GAD7 TOTAL SCORE: 2

## 2017-01-07 LAB — PAIN DRUG SCR UR W RPTD MEDS: NORMAL

## 2017-01-10 ENCOUNTER — RADIANT APPOINTMENT (OUTPATIENT)
Dept: RADIOLOGY | Facility: CLINIC | Age: 62
End: 2017-01-10
Attending: ANESTHESIOLOGY
Payer: MEDICARE

## 2017-01-10 ENCOUNTER — RADIOLOGY INJECTION OFFICE VISIT (OUTPATIENT)
Dept: PALLIATIVE MEDICINE | Facility: CLINIC | Age: 62
End: 2017-01-10
Payer: MEDICARE

## 2017-01-10 VITALS — HEART RATE: 79 BPM | SYSTOLIC BLOOD PRESSURE: 126 MMHG | DIASTOLIC BLOOD PRESSURE: 72 MMHG | OXYGEN SATURATION: 99 %

## 2017-01-10 DIAGNOSIS — M54.16 LUMBAR RADICULOPATHY: Primary | ICD-10-CM

## 2017-01-10 DIAGNOSIS — M99.73 CONNECTIVE TISSUE AND DISC STENOSIS OF INTERVERTEBRAL FORAMINA OF LUMBAR REGION: ICD-10-CM

## 2017-01-10 DIAGNOSIS — M54.16 LUMBAR RADICULOPATHY: ICD-10-CM

## 2017-01-10 PROCEDURE — 64483 NJX AA&/STRD TFRM EPI L/S 1: CPT | Mod: 50 | Performed by: ANESTHESIOLOGY

## 2017-01-10 ASSESSMENT — PAIN SCALES - GENERAL
PAINLEVEL: MILD PAIN (3)
PAINLEVEL: EXTREME PAIN (8)

## 2017-01-10 NOTE — NURSING NOTE
Discharge Information    IV Discontiued Time:  NA    Amount of Fluid Infused:  NA    Discharge Criteria = When patient returns to baseline or as per MD order    Consciousness:  Pt is fully awake    Circulation:  BP +/- 20% of pre-procedure level    Respiration:  Patient is able to breathe deeply    O2 Sat:  Patient is able to maintain O2 Sat >92% on room air    Activity:  Moves 4 extremities on command    Ambulation:  Patient is able to stand and walk or stand and pivot into wheelchair    Dressing:  Clean/dry or No Dressing    Notes:   Discharge instructions and AVS given to patient    Patient meets criteria for discharge?  YES    Admitted to PCU?  No    Responsible adult present to accompany patient home?  Yes    Signature/Title:    Toña Doe RN Care Coordinator  [facility]

## 2017-01-10 NOTE — MR AVS SNAPSHOT
After Visit Summary   1/10/2017    Sandra Dos Santos    MRN: [id number]           Patient Information     Date Of Birth          1955        Visit Information        Provider Department      1/10/2017 10:30 AM Simon Infante MD; M[facility]ILING[facility] [place]        Care Instructions    [facility]   Procedure Discharge Instructions    Today you saw:  Dr. Simon Andrade      You had an:  Lumbar Epidural steroid injection     Medications used:  Lidocaine   Bupivacaine   Dexamethasone Depo-medrol  Omnipaque Normal saline            Be cautious when walking. Numbness and/or weakness in the lower extremities may occur up to 6-8 hours after the procedure due to effect of the local anesthetic    Do not drive for 6 hours. The effect of the local anesthetic could slow your reflexes.     You may resume your regular activities after 24 hours    Avoid strenuous activity for the first 24 hours    You may shower, however avoid swimming, tub baths or hot tubs for 24 hours following your procedure    You may have a mild to moderate increase in pain for several days following the injection.    It may take up to 14 days for the steroid medication to start working although you may feel the effect as early as a few days after the procedure.       You may use ice packs for 10-15 minutes, 3 to 4 times a day at the injection site for comfort    Do not use heat to painful areas for 6 to 8 hours. This will give the local anesthetic time to wear off and prevent you from accidentally burning your skin.     You may use anti-inflammatory medications (such as Ibuprofen or Aleve or Advil) or Tylenol for pain control if necessary    If you were fasting, you may resume your normal diet and medications after the procedure    If you have diabetes, check your blood sugar more frequently than usual as your blood sugar may be higher than normal for 10-14 days following a steroid injection.  "Contact your doctor who manages your diabetes if your blood sugar is higher than usual    If you experience any of the following, call the pain center nursing line during work hours at [phone] or the after hours provider line at [phone]:  -Fever over 100 degree F  -Swelling, bleeding, redness, drainage, warmth at the injection site  -Progressive weakness or numbness in your legs   -Loss of bowel or bladder function  -Unusual headache that is not relieved by Tylenol  -Unusual new onset of pain that is not improving      Phone #s:  Appointment line: [phone];  Nurse line: [phone]            Follow-ups after your visit        Your next 10 appointments already scheduled     Jan 19, 2017 11:30 AM   Return Visit with Simon Andrade MD   [facility] [place] ([facility] [place])    [address]  [place] [address] [phone]              Who to contact     If you have questions or need follow up information about today's clinic visit or your schedule please contact [facility] directly at [phone].  Normal or non-critical lab and imaging results will be communicated to you by MyChart, letter or phone within 4 business days after the clinic has received the results. If you do not hear from us within 7 days, please contact the clinic through [employer] or phone. If you have a critical or abnormal lab result, we will notify you by phone as soon as possible.  Submit refill requests through [employer]pptalk or call your pharmacy and they will forward the refill request to us. Please allow 3 business days for your refill to be completed.          Additional Information About Your Visit        [employer] Information     Upptalk lets you send messages to your doctor, view your test results, renew your prescriptions, schedule appointments and more. To sign up, go to [email or web address] . Click on \"Log in\" on the left side of the screen, which will take you to the " "Welcome page. Then click on \"Sign up Now\" on the right side of the page.     You will be asked to enter the access code listed below, as well as some personal information. Please follow the directions to create your username and password.     Your access code is: [email or web address]  Expires: 2017  1:53 PM     Your access code will  in 90 days. If you need help or a new code, please call your [facility] clinic or [phone].        Care EveryWhere ID     This is your Care EveryWhere ID. This could be used by other organizations to access your [facility] medical records  [id number]        Your Vitals Were     Pulse                   77            Blood Pressure from Last 3 Encounters:   01/10/17 116/68   17 136/72   16 150/78    Weight from Last 3 Encounters:   17 117.028 kg (258 lb)   16 115.214 kg (254 lb)   16 114.306 kg (252 lb)              Today, you had the following     No orders found for display       Primary Care Provider    Physician No Ref-Primary       No address on file        Thank you!     Thank you for choosing [facility]  for your care. Our goal is always to provide you with excellent care. Hearing back from our patients is one way we can continue to improve our services. Please take a few minutes to complete the written survey that you may receive in the mail after your visit with us. Thank you!             Your Updated Medication List - Protect others around you: Learn how to safely use, store and throw away your medicines at www.disposemymeds.org.          This list is accurate as of: 1/10/17 11:36 AM.  Always use your most recent med list.                   Brand Name Dispense Instructions for use    ACCU-CHEK ULISES PLUS test strip   Generic drug:  blood glucose monitoring          albuterol 108 (90 BASE) MCG/ACT Inhaler    PROAIR HFA/PROVENTIL HFA/VENTOLIN HFA    1 Inhaler    Inhale 2 puffs into the lungs every 4 hours as needed for shortness of " breath / dyspnea or wheezing       atorvastatin 10 MG tablet    LIPITOR    90 tablet    Take 1 tablet (10 mg) by mouth daily       cholecalciferol 5000 UNITS Tabs tablet    vitamin D3     Take by mouth daily       * cyclobenzaprine 10 MG tablet    FLEXERIL    30 tablet    Take 0.5-1 tablets (5-10 mg) by mouth 3 times daily as needed for muscle spasms       * cyclobenzaprine 10 MG tablet    FLEXERIL    30 tablet    Take 0.5-1 tablets (5-10 mg) by mouth 3 times daily as needed for muscle spasms       glimepiride 2 MG tablet    AMARYL    135 tablet    1 1/2 tablets daily with food       GLOBAL EASE INJECT PEN NEEDLES 31G X 5 MM   Generic drug:  insulin pen needle          insulin glargine U-300 300 UNIT/ML injection    TOUJEO SOLOSTAR    3 Month    Inject 14 Units Subcutaneous At Bedtime       INVOKANA 100 MG tablet   Generic drug:  canagliflozin     90 tablet    Take 1 tablet (100 mg) by mouth every morning (before breakfast)       lidocaine-prilocaine cream    EMLA    5800 g    Apply topically as needed for moderate pain       losartan 25 MG tablet    COZAAR    90 tablet    Take 1 tablet (25 mg) by mouth daily       order for DME     400 each    Test strips for pt's glucometer, brand as covered by insurance. Test four times daily and prn.       pregabalin 150 MG capsule    LYRICA    60 capsule    Take 1 capsule (150 mg) by mouth 2 times daily       ranitidine 150 MG tablet    ZANTAC    180 tablet    Take 1 tablet (150 mg) by mouth 2 times daily       tamsulosin 0.4 MG capsule    FLOMAX    90 capsule    Take 1 capsule (0.4 mg) by mouth daily       thiamine 100 MG tablet      Take 100 mg by mouth       traMADol 50 MG tablet    ULTRAM    120 tablet    Take 1-2 tablets ( mg) by mouth every 6 hours as needed for pain maximum 4 tablet(s) per day       * Notice:  This list has 2 medication(s) that are the same as other medications prescribed for you. Read the directions carefully, and ask your doctor or other care  provider to review them with you.

## 2017-01-10 NOTE — PROGRESS NOTES
Pre procedure Diagnosis: lumbar radiculopathy, lumbar stenosis   Post procedure Diagnosis: Same  Procedure performed: Lumbar transforaminal epidural steroid injection L4-5 bilaterally, fluoroscopically guided, contrast controlled  Anesthesia: none  Complications: none  Operators: Simon Andrade MD     Indications:   Sandra Dos Santos is a 61 year old male was sent by Dr. Deborah Leslie for lumbar injections.  They have a history of chronic lower back pain with pain down the lower extremities bilaterally, left greater than right.  Exam shows tenderness to palpation in the lower lumbar spine and positive straight leg raise left greater than right and they have tried conservative treatment including physical therapy, medications..    MRI lumbar spine was done on 7/18/16 which showed   MRI Lumbar spine completed at [facility] imaging on 7/18/16:  Report not available but on review of the images there has been progression of multilevel spondylosis and central canal stenosis at L3-4 and worsened central canal stenosis and bilateral foraminal stenosis at L4-5 that is severe in nature.      Options/alternatives, benefits and risks were discussed with the patient including bleeding, infection, tissue trauma, numbness, weakness, paralysis, spinal cord injury, radiation exposure, headache and reaction to medications. Questions were answered to his satisfaction and he agrees to proceed. Voluntary informed consent was obtained and signed.     Vitals were reviewed: Yes  /72 mmHg  Pulse 79  SpO2 99%  Allergies were reviewed:  Yes   Medications were reviewed:  Yes   Pre-procedure pain score: 8/10    Procedure:  After getting informed consent, patient was brought into the procedure suite and was placed in a prone position on the procedure table.   A Pause for the Cause was performed.  Patient was prepped and draped in sterile fashion.     After identifying the right L4-5 neuroforamen, the C-arm was rotated to a right lateral  oblique angle.  A total of 1 ml of Lidocaine 1% was used to anesthetize the skin and the needle track at a skin entry site coaxial with the fluoroscopy beam, and overriding the superior aspect of the neuroforamen.  A 25 gauge 5 inch spinal needle was advanced under intermittent fluoroscopy until it entered the foramen superiorly.    The position was then inspected from anteroposterior and lateral views, and the needle adjusted appropriately.  A total of 1 ml of Omnipaque-300 was injected, confirming appropriate position, with spread into the nerve root sheath and the epidural space, with no intravascular uptake.     Then, after repeated negative aspiration, 2.5 ml of a combination of Decadron 5 mg, Depomedrol 40 mg, Bupivicaine 0.5% 1 ml diluted with 2.5 ml of normal saline to a total injectate volume of 5 ml was injected onto the right L4 nerve root and the needle was flushed with Lidocaine 1% as it was withdrawn.    Then, the left L4-5 neuroforamen was identified.  Local skin and subcutaneous tissue anesthesia was obtained by injecting Lidocaine 1%.  A 25 gauge 5 inch spinal needle was then advanced into the left L4-5 neuroforamen utilizing fluoroscopic guidance.  Aspiration was negative for blood or CSF.  Then, the needle position was verified by injecting Omnipaque 300 1 ml through a pre-filled leur lock extension utilizing real-time fluoroscopy that showed good needle position and adequate spread along the left L4 nerve root as well as spread into the neuroforamen and epidural space with out intravascular or intrathecal uptake.    Then, after repeated negative aspiration, the remaining 2.5 ml of the decadron, depomedrol, bupivicaine combination was injected onto the left L4 nerve root and the needle was flushed with Lidocaine 1% as it was withdrawn.    Total Omnipaque used:  2 ml;  Omnipaque wasted:  8 ml      During the procedure, there was a paresthesia bilaterally described as a pressure  sensation.  Hemostasis was achieved, the area was cleaned, and bandaids were placed when appropriate.  The patient tolerated the procedure well, and was taken to the recovery room.    Images were saved to PACS.    Post-procedure pain score: 3/10  Follow-up includes:   -f/u phone call in one week  -f/u with referring provider and in the pain clinic in 4 weeks    Simon Andrade MD  [facility]

## 2017-01-10 NOTE — PATIENT INSTRUCTIONS
[facility]   Procedure Discharge Instructions    Today you saw:  Dr. Simon Andrade      You had an:  Lumbar Epidural steroid injection     Medications used:  Lidocaine   Bupivacaine   Dexamethasone Depo-medrol  Omnipaque Normal saline            Be cautious when walking. Numbness and/or weakness in the lower extremities may occur up to 6-8 hours after the procedure due to effect of the local anesthetic    Do not drive for 6 hours. The effect of the local anesthetic could slow your reflexes.     You may resume your regular activities after 24 hours    Avoid strenuous activity for the first 24 hours    You may shower, however avoid swimming, tub baths or hot tubs for 24 hours following your procedure    You may have a mild to moderate increase in pain for several days following the injection.    It may take up to 14 days for the steroid medication to start working although you may feel the effect as early as a few days after the procedure.       You may use ice packs for 10-15 minutes, 3 to 4 times a day at the injection site for comfort    Do not use heat to painful areas for 6 to 8 hours. This will give the local anesthetic time to wear off and prevent you from accidentally burning your skin.     You may use anti-inflammatory medications (such as Ibuprofen or Aleve or Advil) or Tylenol for pain control if necessary    If you were fasting, you may resume your normal diet and medications after the procedure    If you have diabetes, check your blood sugar more frequently than usual as your blood sugar may be higher than normal for 10-14 days following a steroid injection. Contact your doctor who manages your diabetes if your blood sugar is higher than usual    If you experience any of the following, call the pain center nursing line during work hours at [phone] or the after hours provider line at [phone]:  -Fever over 100 degree F  -Swelling, bleeding, redness, drainage, warmth at  the injection site  -Progressive weakness or numbness in your legs   -Loss of bowel or bladder function  -Unusual headache that is not relieved by Tylenol  -Unusual new onset of pain that is not improving      Phone #s:  Appointment line: [phone];  Nurse line: [phone]

## 2017-01-10 NOTE — NURSING NOTE
"Chief Complaint   Patient presents with     Pain     Low back       Initial /68 mmHg  Pulse 77 Estimated body mass index is 37.57 kg/(m^2) as calculated from the following:    Height as of 1/4/17: 1.765 m (5' 9.5\").    Weight as of 1/4/17: 117.028 kg (258 lb).  BP completed using cuff size: Large    Injection intake:    If this procedure is requiring IV sedation has patient been NPO for 6  Hours? NA    Is patient on coumadin, plavix or other prescribed blood thinner?   No    If patient is on coumadin was it held for 5 days?   NA    If patient is on plavix was it held for 7 days?    NA     Does patient take aspirin?  No    If this is for a cervical procedure and patient is on aspirin has it been held for 6 days?   NA    Any allergies to contrast dye, iodine, steroid and/or numbing medications?  NO    Is patient currently taking antibiotics or have an active infection?  NO    Does patient have a ? Yes       Is patient pregnant or breastfeeding?  Not Applicable    Are the vital signs normal?  Yes    Purnima Valladares MA      "

## 2017-01-12 PROBLEM — F15.10 METHAMPHETAMINE ABUSE (H): Status: RESOLVED | Noted: 2017-01-04 | Resolved: 2017-01-12

## 2017-01-18 ENCOUNTER — TELEPHONE (OUTPATIENT)
Dept: RADIOLOGY | Facility: CLINIC | Age: 62
End: 2017-01-18

## 2017-01-18 NOTE — TELEPHONE ENCOUNTER
Patient had a Lumbar transforaminal epidural steroid injection L4-5 bilaterally, fluoroscopically guided, contrast controlled on 1/10/17.  Called patient for an update.      Pt reported the following details:  He has been having less pain since the injection.  Told patient that the information will be forwarded to her provider.  Also explained that, if a steroid medication was used, it could take up to 14 days to feel the full effect and if pt has any further questions or concerns pt should call the nurse line at [phone].

## 2017-01-23 ENCOUNTER — TELEPHONE (OUTPATIENT)
Dept: FAMILY MEDICINE | Facility: CLINIC | Age: 62
End: 2017-01-23

## 2017-01-23 DIAGNOSIS — Z79.4 TYPE 2 DIABETES MELLITUS WITH STAGE 3 CHRONIC KIDNEY DISEASE, WITH LONG-TERM CURRENT USE OF INSULIN (H): Primary | ICD-10-CM

## 2017-01-23 DIAGNOSIS — E11.22 TYPE 2 DIABETES MELLITUS WITH STAGE 3 CHRONIC KIDNEY DISEASE, WITH LONG-TERM CURRENT USE OF INSULIN (H): Primary | ICD-10-CM

## 2017-01-23 DIAGNOSIS — N18.30 TYPE 2 DIABETES MELLITUS WITH STAGE 3 CHRONIC KIDNEY DISEASE, WITH LONG-TERM CURRENT USE OF INSULIN (H): Primary | ICD-10-CM

## 2017-01-23 NOTE — TELEPHONE ENCOUNTER
RN spoke with pharmacist Anabel and states patient stating he suppose to be taking 3 tables daily and not 1.5 tablets per the direction on Glimepiride.  RN confirm to Pat per the last OV on 1/4/17 it's for patient to take 1.5 tablets daily and not BID.  Pat agrees and states he will talk to patient and he would also appreciate if writer calls patient as well.    RN called and spoke with patient.  Patient sounded frustrated on the phone stating this has been happened many times with his medications.  Patient states he was informed by Dr. Leslie to take 1.5 tablets BID to control his diabetes, but the prescription is written different than what he was instructed to take.  RN was on the phone with patient for over 20 minutes trying to explain as patient continue to arguing about his dose with writer.  RN advise patient to take medication as it has been prescribed for him by Dr. Leslie until Dr. Leslie comes back in office to clarify on his dose.  RN also informed patient if he takes Glimepiride BID he will be short and he will need to end up paying out of his pocket, because his insurance won't pay it as it will be considered too early to refill.  Patient agrees for now and states he will wait until Dr. Leslie is back.    Please clarify on Glimepiride's dose, does patient needs to take 1.5 tablets BID or once daily?      Luis Alberto IRVING RN, BSN

## 2017-01-23 NOTE — TELEPHONE ENCOUNTER
[facility] requesting a call back as soon as possible regarding Glimepiride. They are needing clarification on how he should take it.     Please advise.    Thank you.    Patti MAY

## 2017-01-24 RX ORDER — GLIMEPIRIDE 2 MG/1
2 TABLET ORAL 2 TIMES DAILY
Qty: 180 TABLET | Refills: 1 | Status: SHIPPED | OUTPATIENT
Start: 2017-01-24 | End: 2017-03-22

## 2017-01-24 NOTE — TELEPHONE ENCOUNTER
Triage -Please call his Pharmacy-There has been Discrepancy-care everywhere says he takes 4 mg BID  [facility] says 2 mg BID  I filled prescription what patient told me in clinic  Please call pharmacy and see what was the last dose written at the pharmacy from [facility]-If it is 1.5 mg BID -please give refills

## 2017-01-24 NOTE — TELEPHONE ENCOUNTER
RN spoke with Luiz a pharmacist from [facility] and states the last Rx was from Dr. Nino and it was 2 MG BID, but patient reporting he should be taking 3 mg BID now, which patient haven't been on this dose ever as far as they are aware and what has been in their record.  Please review and advise on which dose to dispense?    Luis Alberto IRVING RN, BSN

## 2017-01-25 NOTE — TELEPHONE ENCOUNTER
RN notified patient of the provider's message as it's written below.  Patient agrees and verbalized understanding.     Luis Alberto IRVING RN, BSN

## 2017-01-25 NOTE — TELEPHONE ENCOUNTER
Please tell patient-he has several different dosages in his Previous clinics  Please stay on 2 mg BID with food  Check blood sugars daily  See me in 2 weeks

## 2017-02-02 ENCOUNTER — OFFICE VISIT (OUTPATIENT)
Dept: PALLIATIVE MEDICINE | Facility: CLINIC | Age: 62
End: 2017-02-02
Payer: MEDICARE

## 2017-02-02 VITALS
WEIGHT: 253 LBS | HEART RATE: 83 BPM | BODY MASS INDEX: 36.84 KG/M2 | SYSTOLIC BLOOD PRESSURE: 132 MMHG | DIASTOLIC BLOOD PRESSURE: 70 MMHG

## 2017-02-02 DIAGNOSIS — G89.29 CHRONIC BILATERAL LOW BACK PAIN WITH RIGHT-SIDED SCIATICA: Primary | ICD-10-CM

## 2017-02-02 DIAGNOSIS — M99.73 CONNECTIVE TISSUE AND DISC STENOSIS OF INTERVERTEBRAL FORAMINA OF LUMBAR REGION: ICD-10-CM

## 2017-02-02 DIAGNOSIS — M54.16 LUMBAR RADICULOPATHY: ICD-10-CM

## 2017-02-02 DIAGNOSIS — M18.12 PRIMARY OSTEOARTHRITIS OF FIRST CARPOMETACARPAL JOINT OF LEFT HAND: ICD-10-CM

## 2017-02-02 DIAGNOSIS — G89.4 CHRONIC PAIN SYNDROME: ICD-10-CM

## 2017-02-02 DIAGNOSIS — M54.41 CHRONIC BILATERAL LOW BACK PAIN WITH RIGHT-SIDED SCIATICA: Primary | ICD-10-CM

## 2017-02-02 PROCEDURE — 99215 OFFICE O/P EST HI 40 MIN: CPT | Mod: 25 | Performed by: ANESTHESIOLOGY

## 2017-02-02 PROCEDURE — 20600 DRAIN/INJ JOINT/BURSA W/O US: CPT | Mod: LT | Performed by: ANESTHESIOLOGY

## 2017-02-02 RX ORDER — TRAMADOL HYDROCHLORIDE 50 MG/1
50-100 TABLET ORAL EVERY 6 HOURS PRN
Qty: 120 TABLET | Refills: 1 | Status: SHIPPED | OUTPATIENT
Start: 2017-02-02 | End: 2017-05-04

## 2017-02-02 ASSESSMENT — PAIN SCALES - GENERAL: PAINLEVEL: SEVERE PAIN (7)

## 2017-02-02 NOTE — NURSING NOTE
"    Chief Complaint   Patient presents with     Pain       Initial /70 mmHg  Pulse 83  Wt 114.76 kg (253 lb) Estimated body mass index is 36.84 kg/(m^2) as calculated from the following:    Height as of 1/4/17: 1.765 m (5' 9.5\").    Weight as of this encounter: 114.76 kg (253 lb).  BP completed using cuff size: klaudia Sandoval CMA (JAYDON)      "

## 2017-02-02 NOTE — MR AVS SNAPSHOT
After Visit Summary   2/2/2017    Sandra Dos Santos    MRN: [id number]           Patient Information     Date Of Birth          1955        Visit Information        Provider Department      2/2/2017 10:15 AM Simon Infante MD; M[facility]ILING[facility]        Today's Diagnoses     Chronic bilateral low back pain with right-sided sciatica    -  1     Chronic pain syndrome           Care Instructions    Assessment:   1. Lumbar stenosis (narrowing) L4-5  2. Lumbar degenerative disc disease (arthritis)  3. Lumbar radiculopathy (radiating pain)  4. Lumbosacral spondylosis (arthritic changes)  5. Facet arthropathy (joint pain)    6. Sacroiliac joint pain, right    7. Left thumb carpometacarpal joint osteoarthritis      Plan:  1. Physical Therapy:  [facility] for physical therapy eval and treat  2. Clinical Health Psychologist to address issues of relaxation, behavioral change, coping style, and other factors important to improvement.  deferred  3. Diagnostic Studies:  Not indicated at this time  4. Medication Management:    1. Tramadol 50 mg 1-2 tabs every 6 hours as needed  2. Lyrica 150 mg twice daily  5. Further procedures recommended:   1. Recommend repeat LESI interlaminar L5-S1 vs TFESI L4-5 bilaterally  2. Left thumb carpometacarpal joint injection today  6. Recommendations to PCP: defer pain complaints to pain managment  7. Follow up: 6-8 weeks      [facility]   Post Procedure Instructions    Today you had:  joint injection      Medications used:  bupivicaine  depomedrol         Go to the emergency room if you develop any shortness of breath    Monitor the injection sites for signs and symptoms of infection-fever, chills, redness, swelling, warmth, or drainage to areas.    You may have soreness at injection sites for up to 24 hours.    You may apply ice to the painful areas to help minimize the discomfort of the needle pokes.    Do not  apply heat to sites for at least 12 hours.    You may use anti-inflammatory medications or Tylenol for pain control if necessary    After hours provider line: [phone]    Nurse Triage line:  [phone]   Call this number with any questions or concerns. You may leave a detailed message anytime. Calls are typically returned Monday through Friday between 8 AM and 4:30 PM. We usually get back to you within 2 business days depending on the issue/request.       Medication refills:    For non-narcotic medications, call your pharmacy directly to request a refill. The pharmacy will contact the [facility] for authorization. Please allow 3-4 days for these refills to be processed.     For narcotic refills, call the nurse triage line or send a [employer] message. Please contact us 7-10 days before your refill is due. The message MUST include the name of the specific medication(s) requested and how you would like to receive the prescription(s). The options are as follows:    Pain Clinic staff can mail the prescription to your pharmacy. Please tell us the name of the pharmacy.    You may pick the prescription up at the Pain Clinic (tell us the location) or during a clinic visit with your pain provider    Pain Clinic staff can deliver the prescription to the [place] pharmacy in the clinic building. Please tell us the location.      Scheduling number: [phone].  Call this number to schedule or change appointments.    We believe regular attendance is key to your success in our program.    Any time you are unable to keep your appointment we ask that you call us at least 24 hours in advance to let us know. This will allow us to offer the appointment time to another patient.             Follow-ups after your visit        Additional Services     [facility] PT, HAND, AND CHIROPRACTIC REFERRAL       Physical therapy eval and treat for chronic lower back pain, lumbar stenosis                  Who to contact     If you have  "questions or need follow up information about today's clinic visit or your schedule please contact [facility] directly at [phone].  Normal or non-critical lab and imaging results will be communicated to you by MyChart, letter or phone within 4 business days after the clinic has received the results. If you do not hear from us within 7 days, please contact the clinic through [employer] or phone. If you have a critical or abnormal lab result, we will notify you by phone as soon as possible.  Submit refill requests through [employer] or call your pharmacy and they will forward the refill request to us. Please allow 3 business days for your refill to be completed.          Additional Information About Your Visit        [employer] Information     [employer] lets you send messages to your doctor, view your test results, renew your prescriptions, schedule appointments and more. To sign up, go to [email or web address] . Click on \"Log in\" on the left side of the screen, which will take you to the Welcome page. Then click on \"Sign up Now\" on the right side of the page.     You will be asked to enter the access code listed below, as well as some personal information. Please follow the directions to create your username and password.     Your access code is: [email or web address]  Expires: 2017  1:53 PM     Your access code will  in 90 days. If you need help or a new code, please call your [facility] clinic or [phone].        Care EveryWhere ID     This is your Care EveryWhere ID. This could be used by other organizations to access your [facility] medical records  [id number]        Your Vitals Were     Pulse                   83            Blood Pressure from Last 3 Encounters:   17 132/70   01/10/17 126/72   17 136/72    Weight from Last 3 Encounters:   17 114.76 kg (253 lb)   17 117.028 kg (258 lb)   16 115.214 kg (254 lb)              We Performed the Following     [facility] PT, HAND, AND " CHIROPRACTIC REFERRAL          Where to get your medicines      Some of these will need a paper prescription and others can be bought over the counter.  Ask your nurse if you have questions.     Bring a paper prescription for each of these medications    - traMADol 50 MG tablet       Primary Care Provider    Physician No Ref-Primary       No address on file        Thank you!     Thank you for choosing [facility]  for your care. Our goal is always to provide you with excellent care. Hearing back from our patients is one way we can continue to improve our services. Please take a few minutes to complete the written survey that you may receive in the mail after your visit with us. Thank you!             Your Updated Medication List - Protect others around you: Learn how to safely use, store and throw away your medicines at www.disposemymeds.org.          This list is accurate as of: 2/2/17 11:30 AM.  Always use your most recent med list.                   Brand Name Dispense Instructions for use    ACCU-CHEK ULISES PLUS test strip   Generic drug:  blood glucose monitoring          albuterol 108 (90 BASE) MCG/ACT Inhaler    PROAIR HFA/PROVENTIL HFA/VENTOLIN HFA    1 Inhaler    Inhale 2 puffs into the lungs every 4 hours as needed for shortness of breath / dyspnea or wheezing       atorvastatin 10 MG tablet    LIPITOR    90 tablet    Take 1 tablet (10 mg) by mouth daily       cholecalciferol 5000 UNITS Tabs tablet    vitamin D3     Take by mouth daily       * cyclobenzaprine 10 MG tablet    FLEXERIL    30 tablet    Take 0.5-1 tablets (5-10 mg) by mouth 3 times daily as needed for muscle spasms       * cyclobenzaprine 10 MG tablet    FLEXERIL    30 tablet    Take 0.5-1 tablets (5-10 mg) by mouth 3 times daily as needed for muscle spasms       glimepiride 2 MG tablet    AMARYL    180 tablet    Take 1 tablet (2 mg) by mouth 2 times daily With food       GLOBAL EASE INJECT PEN NEEDLES 31G X 5 MM   Generic drug:   insulin pen needle          insulin glargine U-300 300 UNIT/ML injection    TOUJEO SOLOSTAR    3 Month    Inject 14 Units Subcutaneous At Bedtime       INVOKANA 100 MG tablet   Generic drug:  canagliflozin     90 tablet    Take 1 tablet (100 mg) by mouth every morning (before breakfast)       lidocaine-prilocaine cream    EMLA    5800 g    Apply topically as needed for moderate pain       losartan 25 MG tablet    COZAAR    90 tablet    Take 1 tablet (25 mg) by mouth daily       order for DME     400 each    Test strips for pt's glucometer, brand as covered by insurance. Test four times daily and prn.       pregabalin 150 MG capsule    LYRICA    60 capsule    Take 1 capsule (150 mg) by mouth 2 times daily       ranitidine 150 MG tablet    ZANTAC    180 tablet    Take 1 tablet (150 mg) by mouth 2 times daily       tamsulosin 0.4 MG capsule    FLOMAX    90 capsule    Take 1 capsule (0.4 mg) by mouth daily       thiamine 100 MG tablet      Take 100 mg by mouth       traMADol 50 MG tablet    ULTRAM    120 tablet    Take 1-2 tablets ( mg) by mouth every 6 hours as needed for pain maximum 4 tablet(s) per day       * Notice:  This list has 2 medication(s) that are the same as other medications prescribed for you. Read the directions carefully, and ask your doctor or other care provider to review them with you.

## 2017-02-02 NOTE — PATIENT INSTRUCTIONS
Assessment:   1. Lumbar stenosis (narrowing) L4-5  2. Lumbar degenerative disc disease (arthritis)  3. Lumbar radiculopathy (radiating pain)  4. Lumbosacral spondylosis (arthritic changes)  5. Facet arthropathy (joint pain)    6. Sacroiliac joint pain, right    7. Left thumb carpometacarpal joint osteoarthritis      Plan:  1. Physical Therapy:  [facility] for physical therapy eval and treat  2. Clinical Health Psychologist to address issues of relaxation, behavioral change, coping style, and other factors important to improvement.  deferred  3. Diagnostic Studies:  Not indicated at this time  4. Medication Management:    1. Tramadol 50 mg 1-2 tabs every 6 hours as needed  2. Lyrica 150 mg twice daily  5. Further procedures recommended:   1. Recommend repeat LESI interlaminar L5-S1 vs TFESI L4-5 bilaterally  2. Left thumb carpometacarpal joint injection today  6. Recommendations to PCP: defer pain complaints to pain managment  7. Follow up: 6-8 weeks      [facility]   Post Procedure Instructions    Today you had:  joint injection      Medications used:  bupivicaine  depomedrol         Go to the emergency room if you develop any shortness of breath    Monitor the injection sites for signs and symptoms of infection-fever, chills, redness, swelling, warmth, or drainage to areas.    You may have soreness at injection sites for up to 24 hours.    You may apply ice to the painful areas to help minimize the discomfort of the needle pokes.    Do not apply heat to sites for at least 12 hours.    You may use anti-inflammatory medications or Tylenol for pain control if necessary    After hours provider line: [phone]    Nurse Triage line:  [phone]   Call this number with any questions or concerns. You may leave a detailed message anytime. Calls are typically returned Monday through Friday between 8 AM and 4:30 PM. We usually get back to you within 2 business days depending on  the issue/request.       Medication refills:    For non-narcotic medications, call your pharmacy directly to request a refill. The pharmacy will contact the [facility] for authorization. Please allow 3-4 days for these refills to be processed.     For narcotic refills, call the nurse triage line or send a [employer] message. Please contact us 7-10 days before your refill is due. The message MUST include the name of the specific medication(s) requested and how you would like to receive the prescription(s). The options are as follows:    Pain Clinic staff can mail the prescription to your pharmacy. Please tell us the name of the pharmacy.    You may pick the prescription up at the Pain Clinic (tell us the location) or during a clinic visit with your pain provider    Pain Clinic staff can deliver the prescription to the [facility] pharmacy in the clinic building. Please tell us the location.      Scheduling number: [phone].  Call this number to schedule or change appointments.    We believe regular attendance is key to your success in our program.    Any time you are unable to keep your appointment we ask that you call us at least 24 hours in advance to let us know. This will allow us to offer the appointment time to another patient.

## 2017-02-02 NOTE — PROGRESS NOTES
[facility]    Date of visit: 2/2/2017    Chief complaint:   Chief Complaint   Patient presents with     Pain       Interval history:  Sandra Dos Santos was last seen by me on 1/10/17 for lumbar TFESI L4-5 bilaterally that offered significant relief of his pain at the time of discharge.  His last office visit was on 12/30/16.      Recommendations/plan at the last visit included:  1. Physical Therapy: continue PT for core strengthening exercises  2. Clinical Health Psychologist to address issues of relaxation, behavioral change, coping style, and other factors important to improvement: deferred - patient coping well at this time  3. Diagnostic Studies: reviewed MRI with patient  4. Medication Management:    1. Continue Lyrica as prescribed  2. Continue tylenol - maximum of 3000 mg per day  3. Continue Ultram as prescribed  5. Further procedures recommended: lumbar transforaminal epidural steroid injections L4-5 bilaterally  6. May benefit from interlaminar or caudal epidural steroid injection depending on results of transforaminal approach  7. May benefit from facet joint injections in the future as well  8. Acupuncture: deferred  9. Urine toxicology screen today: deferred    10. Recommendations/follow-up for PCP:  Continue medications as prescribed  11. Follow up: 6-8 weeks    Since his last visit, Sandra Dos Santos reports:  He had really good relief of his back and leg pain for two weeks after the injection.  His pain is still better but he notes increased pain when he is out in the cold.  He states that his worst pain is in the buttocks and the backs of the thighs to above the knees bilaterally.  He has very little to no pain with sitting or lying down.  Sometimes he will get a sharp pain with turning over in the bed.  The right leg hurts worse than the left.  He denies numbness or weakness at this time.    He also complains of joint pain in his left thumb that comes and goes but is severe and limits  the use of the left hand.  This is also worsened by cold weather.  He states that it is painful to lean on the hand or to hold on to things with the left hand.    Pain scores:  Pain intensity on average is 6 on a scale of 0-10. Ranges from 3/10 to 7/10    Current pain treatments:   Tramadol 50 mg 2 tablets twice a day  Lyrica 150 mg BID      Past pain treatments:  Flexeril - made him too sleepy    Side Effects: sedation    Medications:  Current Outpatient Prescriptions   Medication Sig Dispense Refill     glimepiride (AMARYL) 2 MG tablet Take 1 tablet (2 mg) by mouth 2 times daily With food 180 tablet 1     pregabalin (LYRICA) 150 MG capsule Take 1 capsule (150 mg) by mouth 2 times daily 60 capsule 1     lidocaine-prilocaine (EMLA) cream Apply topically as needed for moderate pain 5800 g 1     traMADol (ULTRAM) 50 MG tablet Take 1-2 tablets ( mg) by mouth every 6 hours as needed for pain maximum 4 tablet(s) per day 120 tablet 0     atorvastatin (LIPITOR) 10 MG tablet Take 1 tablet (10 mg) by mouth daily 90 tablet 3     insulin glargine U-300 (TOUJEO SOLOSTAR) 300 UNIT/ML injection Inject 14 Units Subcutaneous At Bedtime 3 Month 1     cyclobenzaprine (FLEXERIL) 10 MG tablet Take 0.5-1 tablets (5-10 mg) by mouth 3 times daily as needed for muscle spasms 30 tablet 1     INVOKANA 100 MG tablet Take 1 tablet (100 mg) by mouth every morning (before breakfast) 90 tablet 1     tamsulosin (FLOMAX) 0.4 MG capsule Take 1 capsule (0.4 mg) by mouth daily 90 capsule 3     ranitidine (ZANTAC) 150 MG tablet Take 1 tablet (150 mg) by mouth 2 times daily 180 tablet 1     albuterol (PROAIR HFA/PROVENTIL HFA/VENTOLIN HFA) 108 (90 BASE) MCG/ACT Inhaler Inhale 2 puffs into the lungs every 4 hours as needed for shortness of breath / dyspnea or wheezing 1 Inhaler 11     order for DME Test strips for pt's glucometer, brand as covered by insurance. Test four times daily and prn. 400 each 4     losartan (COZAAR) 25 MG tablet Take 1  tablet (25 mg) by mouth daily 90 tablet 3     GLOBAL EASE INJECT PEN NEEDLES 31G X 5 MM   3     ACCU-CHEK ULISES PLUS test strip   2     cholecalciferol (VITAMIN D3) 5000 UNITS TABS tablet Take by mouth daily       thiamine 100 MG tablet Take 100 mg by mouth       cyclobenzaprine (FLEXERIL) 10 MG tablet Take 0.5-1 tablets (5-10 mg) by mouth 3 times daily as needed for muscle spasms 30 tablet 1       Medical History: any changes in medical history since they were last seen? No    Review of Systems:  The 14 system ROS was reviewed from the intake questionnaire, and is positive for: headache, diabetes, arthritis, joint pain, joint stiffness, back pain  Any bowel or bladder problems: denies changes  Mood: good- pleasant    Physical Exam:  /70 mmHg  Pulse 83  Wt 114.76 kg (253 lb)  Constitutional: alert and no distress.  Pt is obese  Head: Normocephalic. No masses, lesions, tenderness or abnormalities  ENT: EOMI, mucosal surfaces moist.  Neck with full ROM, posture fair  Cardiovascular: No edema or JVD appreciated.  Respiratory: Good diaphragmatic excursion. No wheezes appreciated.  Speaking in complete sentences without shortness of breath.  No accessory muscle use.   Musculoskeletal: extremities normal- no gross deformities noted, normal muscle tone and able to move about the exam room without difficulty.    Skin: no suspicious lesions or rashes appreciated on exposed areas  Neurologic: Gait intact, stiff and painful in the lower back on initial rising that eases with movement. Moving all extremities spontaneously, no apparent weakness.    Psychiatric: mentation appears normal, affect full and good eye contact.      Cervical Spine:  Good range of motion without pain, exam limited  Lumbar Spine:  Tender right SI joint and gluteals. Flexes greater than 90 degrees, extension and lateral rotation of the lumbar spine does cause some pain in the right lower lumbar spine.  Straight leg raise is positive on the  right.    Left thumb carpal-metacarpal joint tender to palpation with pain with range of motion. No swelling or erythema appreciated.    [facility] Lum[facility] on [facility]18/16:  Clinical History: Low back pain. Technique: Multiplanar multisequence lumbar spine MRI without contrast. Comparison: 11/20/2014.   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 Alignment is normal. Vertebral body heights and disc heights are normal. There is dehydration of the L4-L5 disc. Marrow signal is normal. The conus medullaris is normally positioned at the T12-L1 level. The paraspinous soft tissues are unremarkable.   T12-L1, L1-L2: Unremarkable.   L2-L3: Minimal annular bulge. Mild facet hypertrophy. The spinal canal and neural foramina are patent.   L3-L4: Minimal annular bulge superimposed upon a congenitally small spinal canal. Mild facet hypertrophy and mild to moderate ligamentum flavum thickening. The spinal canal is patent. The neural foramina are patent bilaterally.   L4-L5: Congenitally small spinal canal with superimposed shallow disc bulge causes moderate to severe spinal canal narrowing. There is lateral recess stenosis bilaterally, right more than left. The neural foramina are moderately narrowed on the right and mild to moderately narrowed on the left.   L5-S1: Congenitally small spinal canal. The spinal canal, neural foramina, and lateral recesses are patent.   Impression: Mild degenerative disc disease superimposed upon a congenitally small spinal canal causing moderate to severe central spinal stenosis at L4-L5. There is associated lateral recess stenosis at this level which may be affecting the L5 nerve roots. There is also neural foraminal narrowing bilaterally at L4-L5, moderate on the right and mild to moderate on the left.     Assessment:   1. Lumbar stenosis L4-5  2. Lumbar degenerative disc disease   3. Lumbar radiculopathy   4. Lumbosacral spondylosis   5. Facet arthropathy      6. Sacroiliac joint pain, right    7. Left thumb carpometacarpal joint osteoarthritis    Sandra Dos Santos is a 61 year old male who is seen at the pain clinic for chronic lower extremity pain without significant lower back pain.  His pain is always worse on first rising and with prolonged walking consistent with claudication.  He got significant relief of his symptoms with the epidural steroid injection but his pain is returning.  He also presents with left thumb pain as described above that is limiting the use of his left hand.  Our plan of care is as outlined below..    Plan:  1. Physical Therapy:  [facility] for physical therapy eval and treat  2. Clinical Health Psychologist to address issues of relaxation, behavioral change, coping style, and other factors important to improvement.  deferred  3. Diagnostic Studies:  Not indicated at this time  4. Medication Management:    1. Tramadol 50 mg 1-2 tabs Q6H - Rx given today - 2 month supply  2. Lyrica 150 mg BID  5. Further procedures recommended:   1. Recommend repeat LESI interlaminar L5-S1 vs TFESI L4-5 bilaterally  2. Left thumb carpometacarpal joint injection today - see procedure note below  6. Recommendations to PCP: defer pain complaints to pain managment  7. Follow up: 6-8 weeks    Total time spent was 45 minutes, and more than 50% of face to face time was spent in counseling and/or coordination of care regarding medication management, physical therapy, activity modifications, and interventional procedures to decrease pain and improve function.    Procedure note:  Pre-procedure diagnosis:  Left thumb carpal-metacarpal joint arthropathy  Post-procedure diagnosis:  Same  Procedure performed:  Left thumb carpal-metacarpal joint injection  Indication:  Therapeutic for pain  Complications:  None  Physician:  Simon Andrade MD    The risks, indications, benefits, and alternative treatments were discussed with the patient and he  verbalized understanding.  Signed informed consent was obtained.    The patient was positioned supine with the left arm at his side.  The point of maximal tenderness was palpated at the left thumb carpal-metacarpal joint and marked.  The area was prepped with chloroprep.  The, a 30 gauge 1 inch needle was advanced into the joint with positive pain reproduction.  Aspiration was negative.  Then, 1.5 ml of a combination of Depomedrol 20 mg and Bupivicaine 0.5% was injected into the joint and the needle was withdrawn.  The injection site was cleaned and a sterile bandaid was placed.    The patient tolerated the procedure well without complications.  He was recovered and discharged to home in good condition.    Pre-procedure pain:  8-9/10  Post-procedure pain:  0/10    Follow-up:  As scheduled in the pain clinic          Nursing call in 1 week    Simon Andrade MD  [facility]

## 2017-02-08 ENCOUNTER — THERAPY VISIT (OUTPATIENT)
Dept: PHYSICAL THERAPY | Facility: CLINIC | Age: 62
End: 2017-02-08
Payer: MEDICARE

## 2017-02-08 DIAGNOSIS — M54.16 LUMBAR RADICULOPATHY: Primary | ICD-10-CM

## 2017-02-08 PROCEDURE — G8978 MOBILITY CURRENT STATUS: HCPCS | Mod: GP | Performed by: PHYSICAL THERAPIST

## 2017-02-08 PROCEDURE — 97110 THERAPEUTIC EXERCISES: CPT | Mod: GP | Performed by: PHYSICAL THERAPIST

## 2017-02-08 PROCEDURE — 97162 PT EVAL MOD COMPLEX 30 MIN: CPT | Mod: GP | Performed by: PHYSICAL THERAPIST

## 2017-02-08 PROCEDURE — G8979 MOBILITY GOAL STATUS: HCPCS | Mod: GP | Performed by: PHYSICAL THERAPIST

## 2017-02-08 NOTE — PROGRESS NOTES
"[facility] Initial Evaluation      Subjective:    Sandra Dos Santos is a 61 year old male with a lumbar condition.  Condition occurred with:  Insidious onset.  Condition occurred: for unknown reasons.  This is a chronic condition  Pt states pain started about a year ago without specific incident.  Had a course of PT which pt states provided neither \"benefit nor harm.\"  Had injection 01/10/2017, which pt states provided good relief for about two weeks.  However, since then he reports complete return of symptoms identical to prior to the injection.  Referred to PT 02/02/2017.    Patient reports pain:  Lumbar spine left and lumbar spine right.  Radiates to:  Gluteals right, gluteals left, thigh right, thigh left, lower leg left and foot left.  Pain is described as sharp (\"muscles are pulling\") and is intermittent and reported as 10/10 (up to).   Pain is the same all the time.  Exacerbated by: standing 5 minutes, rolling in bed. Relieved by: being \"idle,\" walking for a few steps.  Since onset symptoms are unchanged.  Special testing: see imaging in chart.  Previous treatment: see above.  There was no improvement following previous treatment.  General health as reported by patient is fair.  Pertinent medical history includes:  Osteoarthritis, overweight, high blood pressure, diabetes and asthma.  Medical allergies: yes (see chart).  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  Current medications:  Pain medication and high blood pressure medication.  Current occupation is retired.        Barriers include:  None as reported by the patient.    Red flags:  None as reported by the patient.    Pt states his goal is to be able to stand longer.                 Objective:      Gait:  Pt ambulates with single point cane in R hand.  He reports he had previously used this d/t his knees but has continued as he finds it at least a little helpful for his back.                   Lumbar/SI " Evaluation  ROM:      Strength: TA MMT 1/5  Lumbar Myotomes:    T12-L3 (Hip Flex):  Left: 4    Right: 4  L2-4 (Quads):  Left:  4    Right:  4  L4 (Ankle DF):  Left:  4    Right:  4  L5 (Great Toe Ext): Left: 4    Right: 4   S1 (Toe Raise):  Left: 4    Right: 4      Lumbar Dermtomes:  normal                Neural Tension/Mobility:      Left side:SLR; SLR w/DF or Slump  negative.     Right side:   SLR w/DF; Slump or SLR  negative.   Lumbar Palpation:  normal        Lumbar Provocation:      Left negative with:  PROM hip    Right negative with:  PROM hip    SI joint/Sacrum:    Negative Chris, thigh thrustZAINAB Lumbar Evaluation    Posture:  Sitting: fair  Standing: fair  Lordosis: Reduced  Lateral Shift: no  Correction of Posture: no effect    Movement Loss:  Flexion (Flex): mod  Extension (EXT): mod and pain  Side [place] R (SG R): mod and pain  Side Glide L (SG L): mod and pain  Test Movements:  FIS: During: centralizing  After: centralizing  Pretest Movements: across low back to L thigh  Repeat FIS: During: centralizing  After: centralizing    EIS: During: peripheralizing  After: peripheralizing    Repeat EIS: During: peripheralizing  After: peripheralizing    MANOLO: During: no effect  After: no effect    Repeat MANOLO: During: no effect  After: no effect    EIL: During: peripheralizing  After: peripheralizing    Repeat EIL: During: peripheralizing  After: peripheralizing          Conclusion: derangement  Principle of Treatment:    Flexion: start with flexion, which he noted actually felt best in sitting                                               ROS    Assessment/Plan:      Patient is a 61 year old male with lumbar complaints.    Patient has the following significant findings with corresponding treatment plan.                Diagnosis 1:  Lumbar radiculopathy  Pain -  hot/cold therapy and directional preference exercise  Decreased ROM/flexibility  - manual therapy and therapeutic exercise  Decreased strength - therapeutic exercise and therapeutic activities  Impaired gait - gait training  Decreased function - therapeutic activities  Impaired posture - neuro re-education    Therapy Evaluation Codes:   1) History comprised of:   Personal factors that impact the plan of care:      Language, Past/current experiences and Time since onset of symptoms.    Comorbidity factors that impact the plan of care are:      Diabetes, Depression, Osteoarthritis and Overweight.     Medications impacting care: Pain.  2) Examination of Body Systems comprised of:   Body structures and functions that impact the plan of care:      Knee and Lumbar spine.   Activity limitations that impact the plan of care are:      Standing, Walking and Sleeping.  3) Clinical presentation characteristics are:   Evolving/Changing.  4) Decision-Making    Moderate complexity using standardized patient assessment instrument and/or measureable assessment of functional outcome.  Cumulative Therapy Evaluation is: Moderate complexity.    Previous and current functional limitations:  (See Goal Flow Sheet for this information)    Short term and Long term goals: (See Goal Flow Sheet for this information)     Communication ability:  Patient has an  for communication clarity.  Treatment Explanation - The following has been discussed with the patient:   RX ordered/plan of care  Anticipated outcomes  Possible risks and side effects  This patient would benefit from PT intervention to resume normal activities.   Rehab potential is fair.    Frequency:  1 X week, once daily  Duration:  for 4 weeks  Discharge Plan:  Achieve all LTG.  Independent in home treatment program.  Reach maximal therapeutic benefit.    Please refer to the daily flowsheet for treatment today, total treatment time and time spent performing 1:1 timed codes.

## 2017-02-08 NOTE — Clinical Note
"DEPARTMENT OF HEALTH AND HUMAN SERVICES  [facility] FOR MEDICARE & MEDICAID SERVICES    PLAN/UPDATED PLAN OF PROGRESS FOR OUTPATIENT REHABILITATION    PATIENTS NAME:  Sandra Dos Santos   : 1955  PROVIDER NUMBER:    [id number]  HICN: [id number]   PROVIDER NAME: [facility]  MEDICAL RECORD NUMBER: [id number]   START OF CARE DATE:  SOC Date: 17   TYPE:  PT  PRIMARY/TREATMENT DIAGNOSIS: (Pertinent Medical Diagnosis)  Lumbar radiculopathy  VISITS FROM START OF CARE:  Rxs Used: 1     [facility] Initial Evaluation  Subjective:  Sandra Dos Santos is a 61 year old male with a lumbar condition.  Condition occurred with:  Insidious onset.  Condition occurred: for unknown reasons.  This is a chronic condition  Pt states pain started about a year ago without specific incident.  Had a course of PT which pt states provided neither \"benefit nor harm.\"  Had injection 01/10/2017, which pt states provided good relief for about two weeks.  However, since then he reports complete return of symptoms identical to prior to the injection.  Referred to PT 2017. Patient reports pain:  Lumbar spine left and lumbar spine right.  Radiates to:  Gluteals right, gluteals left, thigh right, thigh left, lower leg left and foot left.  Pain is described as sharp (\"muscles are pulling\") and is intermittent and reported as 10/10 (up to).  Pain is the same all the time.  Exacerbated by: standing 5 minutes, rolling in bed. Relieved by: being \"idle,\" walking for a few steps.  Since onset symptoms are unchanged. Special testing: see imaging in chart.  Previous treatment: see above.  There was no improvement following previous treatment.  General health as reported by patient is fair. Pertinent medical history includes:  Osteoarthritis, overweight, high blood pressure, diabetes and asthma.  Medical allergies: yes (see chart).  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  " Current medications:  Pain medication and high blood pressure medication.  Current occupation is retired.      Barriers include:  None as reported by the patient.  Red flags:  None as reported by the patient.  Pt states his goal is to be able to stand longer.                 Objective:  Gait:  Pt ambulates with single point cane in R hand.  He reports he had previously used this d/t his knees but has continued as he finds it at least a little helpful for his back.  Lumbar/SI Evaluation  ROM:    Strength: TA MMT /5  Lumbar Myotomes:    T12-L3 (Hip Flex):  Left: 4    Right: 4  L2-4 (Quads):  Left:  4    Right:  4  L4 (Ankle DF):  Left:  4    Right:  4  L5 (Great Toe Ext): Left: 4    Right: 4   S1 (Toe Raise):  Left: 4    Right: 4  PATIENTS NAME:  Sandra Dos Santos   : 1955  Lumbar Dermtomes:  normal    Neural Tension/Mobility:    Left side:SLR; SLR w/DF or Slump  negative.   Right side:   SLR w/DF; Slump or SLR  negative.   Lumbar Palpation:  normal    Lumbar Provocation:    Left negative with:  PROM hip  Right negative with:  PROM hip  SI joint/Sacrum:    Negative Chris, thigh thrust, ZAINAB [place] Lumbar Evaluation  Posture:  Sitting: fair  Standing: fair  Lordosis: Reduced  Lateral Shift: no  Correction of Posture: no effect  Movement Loss:  Flexion (Flex): mod  Extension (EXT): mod and pain  Side [place] R (SG R): mod and pain  Side Glide L (SG L): mod and pain  Test Movements:  FIS: During: centralizing  After: centralizing  Pretest Movements: across low back to L thigh  Repeat FIS: During: centralizing  After: centralizing    EIS: During: peripheralizing  After: peripheralizing    Repeat EIS: During: peripheralizing  After: peripheralizing    MANOLO: During: no effect  After: no effect    Repeat MANOLO: During: no effect  After: no effect    EIL: During: peripheralizing  After: peripheralizing    Repeat EIL: During: peripheralizing  After: peripheralizing    Conclusion: derangement  Principle of  Treatment:  Flexion: start with flexion, which he noted actually felt best in sitting    Assessment/Plan:    Patient is a 61 year old male with lumbar complaints.    Patient has the following significant findings with corresponding treatment plan.                Diagnosis 1:  Lumbar radiculopathy  Pain -  hot/cold therapy and directional preference exercise  Decreased ROM/flexibility - manual therapy and therapeutic exercise  Decreased strength - therapeutic exercise and therapeutic activities  Impaired gait - gait training  Decreased function - therapeutic activities  Impaired posture - neuro re-education    PATIENTS NAME:  Sandra Dos Santos   : 1955  Therapy Evaluation Codes:   1) History comprised of:   Personal factors that impact the plan of care:      Language, Past/current experiences and Time since onset of symptoms.    Comorbidity factors that impact the plan of care are:      Diabetes, Depression, Osteoarthritis and Overweight.     Medications impacting care: Pain.  2) Examination of Body Systems comprised of:   Body structures and functions that impact the plan of care:      Knee and Lumbar spine.   Activity limitations that impact the plan of care are:      Standing, Walking and Sleeping.  3) Clinical presentation characteristics are:   Evolving/Changing.  4) Decision-Making    Moderate complexity using standardized patient assessment instrument and/or measureable assessment of functional outcome.  Cumulative Therapy Evaluation is: Moderate complexity.    Previous and current functional limitations:  (See Goal Flow Sheet for this information)    Short term and Long term goals: (See Goal Flow Sheet for this information)   Communication ability:  Patient has an  for communication clarity.  Treatment Explanation - The following has been discussed with the patient:   RX ordered/plan of care  Anticipated outcomes  Possible risks and side effects  This patient would benefit from PT intervention to  "resume normal activities.   Rehab potential is fair.  Frequency:  1 X week, once daily  Duration:  for 4 weeks  Discharge Plan:  Achieve all LTG.  Independent in home treatment program.  Reach maximal therapeutic benefit.  Please refer to the daily flowsheet for treatment today, total treatment time and time spent performing 1:1 timed codes.             Caregiver Signature/Credentials _____________________________ Date ________      Treating Provider: Chavez Velez, PT   I have reviewed and certified the need for these services and plan of treatment while under my care.        PHYSICIAN'S SIGNATURE:   _________________________________________  Date___________   [place]    PATIENTS NAME:  Sandra Dos Santos   : 1955  Certification period:  Beginning of Cert date period: 17 to  End of Cert period date: 17     Functional Level Progress Report: Please see attached \"Goal Flow sheet for Functional level.\"    ____X____ Continue Services or       ________ DC Services                Service dates: From  SOC Date: 17 date to present                           "

## 2017-02-13 ENCOUNTER — TELEPHONE (OUTPATIENT)
Dept: PALLIATIVE MEDICINE | Facility: CLINIC | Age: 62
End: 2017-02-13

## 2017-02-13 NOTE — TELEPHONE ENCOUNTER
Paperwork on physical therapy plan/update of plan received.  Placed in Dr. Mariana Andrade's in-basket for review and signature.  This is to be return to Stephany at the  when signed.

## 2017-02-14 ENCOUNTER — THERAPY VISIT (OUTPATIENT)
Dept: PHYSICAL THERAPY | Facility: CLINIC | Age: 62
End: 2017-02-14
Payer: MEDICARE

## 2017-02-14 DIAGNOSIS — M54.16 LUMBAR RADICULOPATHY: ICD-10-CM

## 2017-02-14 PROCEDURE — 97110 THERAPEUTIC EXERCISES: CPT | Mod: GP | Performed by: PHYSICAL THERAPIST

## 2017-02-14 NOTE — PROGRESS NOTES
"Subjective:    HPI                    Objective:    System    Physical Exam    General     ROS    Assessment/Plan:      SUBJECTIVE  Subjective: Pt reports he got a few minutes of relief doing the exercises once but has not at all been sustained since.  Feels overall no change whatsoever in symptoms compared to last week.   Current Pain level: No change   Changes in function:  None     Adverse reaction to treatment or activity:  None    OBJECTIVE  Objective: Increased pain at low back with standing and supine flexion, increased further with repetition.  Peripheralization with standing and prone extension.  No lateral shift.  Long leg distraction reported to feel \"a little good\" but returned to baseline upon completion and pt noted that traction in the past was not helpful.     ASSESSMENT  Noor continues to require intervention to meet STG and LTG's: PT  No change of symptoms has been noted.  Response to therapy has shown lack of progress in  pain level and function  Progress made towards STG/LTG?  None    PLAN  Pt indicates being frustrated that his pain is unchanged but describes a willingness to work at it, especially if he can see progress.  Pt mentioned that massage came up at his last physician appt and that he was encouraged to pursue massage, if it would help (not noting this in clinic notes).  Given lack of symptomatic progress to date and overall history, will plan to confer with referring physician prior to next PT appt.    PTA/ATC plan:  N/A    Please refer to the daily flowsheet for treatment today, total treatment time and time spent performing 1:1 timed codes.              "

## 2017-02-14 NOTE — TELEPHONE ENCOUNTER
Physical therapy report reviewed and signed.    Simon Andrade MD  [facility]

## 2017-02-17 NOTE — PROGRESS NOTES
See plan 02/14.  Conferred with Dr Mariana Andrade.  He indicated OK to suggest to pt repeat injection given did get two weeks of changed symptoms after first injection.  Will plan to discuss at next scheduled appt 02/23.

## 2017-02-23 ENCOUNTER — THERAPY VISIT (OUTPATIENT)
Dept: PHYSICAL THERAPY | Facility: CLINIC | Age: 62
End: 2017-02-23
Payer: MEDICARE

## 2017-02-23 DIAGNOSIS — M54.16 LUMBAR RADICULOPATHY: ICD-10-CM

## 2017-02-23 PROCEDURE — G8980 MOBILITY D/C STATUS: HCPCS | Mod: GP | Performed by: PHYSICAL THERAPIST

## 2017-02-23 PROCEDURE — G0283 ELEC STIM OTHER THAN WOUND: HCPCS | Mod: GP | Performed by: PHYSICAL THERAPIST

## 2017-02-23 PROCEDURE — G8979 MOBILITY GOAL STATUS: HCPCS | Mod: GP | Performed by: PHYSICAL THERAPIST

## 2017-02-23 NOTE — MR AVS SNAPSHOT
"              After Visit Summary   2/23/2017    Sandra Dos Santos    MRN: [id number]           Patient Information     Date Of Birth          1955        Visit Information        Provider Department      2/23/2017 1:55 PM Chavez Velez PT [facility]        Today's Diagnoses     Lumbar radiculopathy           Follow-ups after your visit        Your next 10 appointments already scheduled     Mar 22, 2017  9:30 AM CDT   Return Visit with Simon Andrade MD   [facility] [place] ([facility])    [id number]  [place] [address] [phone]              Who to contact     If you have questions or need follow up information about today's clinic visit or your schedule please contact [place] [facility] directly at [phone].  Normal or non-critical lab and imaging results will be communicated to you by MyChart, letter or phone within 4 business days after the clinic has received the results. If you do not hear from us within 7 days, please contact the clinic through MyChart or phone. If you have a critical or abnormal lab result, we will notify you by phone as soon as possible.  Submit refill requests through [employer] or call your pharmacy and they will forward the refill request to us. Please allow 3 business days for your refill to be completed.          Additional Information About Your Visit        MyChart Information     [employer] lets you send messages to your doctor, view your test results, renew your prescriptions, schedule appointments and more. To sign up, go to [email or web address] . Click on \"Log in\" on the left side of the screen, which will take you to the Welcome page. Then click on \"Sign up Now\" on the right side of the page.     You will be asked to enter the access code listed below, as well as some personal information. Please follow the directions to create your username and password.     Your " access code is: [email or web address]  Expires: 2017  1:53 PM     Your access code will  in 90 days. If you need help or a new code, please call your [facility] clinic or [phone].        Care EveryWhere ID     This is your Care EveryWhere ID. This could be used by other organizations to access your [facility] medical records  [id number]         Blood Pressure from Last 3 Encounters:   17 132/70   01/10/17 126/72   17 136/72    Weight from Last 3 Encounters:   17 114.8 kg (253 lb)   17 117 kg (258 lb)   16 115.2 kg (254 lb)              We Performed the Following     Electric Stim Unattended        Primary Care Provider    Physician No Ref-Primary       No address on file        Thank you!     Thank you for choosing [place]  for your care. Our goal is always to provide you with excellent care. Hearing back from our patients is one way we can continue to improve our services. Please take a few minutes to complete the written survey that you may receive in the mail after your visit with us. Thank you!             Your Updated Medication List - Protect others around you: Learn how to safely use, store and throw away your medicines at www.disposemymeds.org.          This list is accurate as of: 17  9:13 PM.  Always use your most recent med list.                   Brand Name Dispense Instructions for use    ACCU-CHEK ULISES PLUS test strip   Generic drug:  blood glucose monitoring          albuterol 108 (90 BASE) MCG/ACT Inhaler    PROAIR HFA/PROVENTIL HFA/VENTOLIN HFA    1 Inhaler    Inhale 2 puffs into the lungs every 4 hours as needed for shortness of breath / dyspnea or wheezing       atorvastatin 10 MG tablet    LIPITOR    90 tablet    Take 1 tablet (10 mg) by mouth daily       cholecalciferol 5000 UNITS Tabs tablet    vitamin D3     Take by mouth daily       * cyclobenzaprine 10 MG tablet    FLEXERIL    30 tablet    Take 0.5-1 tablets (5-10 mg)  by mouth 3 times daily as needed for muscle spasms       * cyclobenzaprine 10 MG tablet    FLEXERIL    30 tablet    Take 0.5-1 tablets (5-10 mg) by mouth 3 times daily as needed for muscle spasms       glimepiride 2 MG tablet    AMARYL    180 tablet    Take 1 tablet (2 mg) by mouth 2 times daily With food       GLOBAL EASE INJECT PEN NEEDLES 31G X 5 MM   Generic drug:  insulin pen needle          insulin glargine U-300 300 UNIT/ML injection    TOUJEO SOLOSTAR    3 Month    Inject 14 Units Subcutaneous At Bedtime       INVOKANA 100 MG tablet   Generic drug:  canagliflozin     90 tablet    Take 1 tablet (100 mg) by mouth every morning (before breakfast)       lidocaine-prilocaine cream    EMLA    5800 g    Apply topically as needed for moderate pain       losartan 25 MG tablet    COZAAR    90 tablet    Take 1 tablet (25 mg) by mouth daily       order for DME     400 each    Test strips for pt's glucometer, brand as covered by insurance. Test four times daily and prn.       pregabalin 150 MG capsule    LYRICA    60 capsule    Take 1 capsule (150 mg) by mouth 2 times daily       ranitidine 150 MG tablet    ZANTAC    180 tablet    Take 1 tablet (150 mg) by mouth 2 times daily       tamsulosin 0.4 MG capsule    FLOMAX    90 capsule    Take 1 capsule (0.4 mg) by mouth daily       thiamine 100 MG tablet      Take 100 mg by mouth       traMADol 50 MG tablet    ULTRAM    120 tablet    Take 1-2 tablets ( mg) by mouth every 6 hours as needed for pain maximum 4 tablet(s) per day       * Notice:  This list has 2 medication(s) that are the same as other medications prescribed for you. Read the directions carefully, and ask your doctor or other care provider to review them with you.

## 2017-02-24 NOTE — PROGRESS NOTES
Subjective:    HPI                    Objective:    System    Physical Exam    General     ROS    Assessment/Plan:      DISCHARGE REPORT    Progress reporting period is from 02/08/2017 to today.       SUBJECTIVE  Subjective: Pt reports no change in symptoms.  Has been doing HEP consistently and may note feeling somewhat stronger but pain is still the same.  Frustrated at lack of overall progress and continues to not sleep well at all.    Current Pain level: No change.     Initial Pain level: 10/10.   Changes in function:  None  Adverse reaction to treatment or activity: None    OBJECTIVE  Objective: No further objective exam performed today given pt's lack of reports of progress and his desire to change plan of care due to frustration with physical therapy.     ASSESSMENT/PLAN  Updated problem list and treatment plan: Diagnosis 1:  LBP -- see plan below  STG/LTGs have been met or progress has been made towards goals:  None  Assessment of Progress: The patient's condition is unchanged.  Self Management Plans:  Patient has been instructed in a home treatment program.  I have re-evaluated this patient and find that the nature, scope, duration and intensity of the therapy is appropriate for the medical condition of the patient.  Noor continues to require the following intervention to meet STG and LTG's:  PT intervention is no longer required to meet STG/LTG.    Recommendations:  Noting lack of any subjective progress and pt expresses frustration over unchanged pain and perceived lack of change in plan of care.  He also reported frustration at my conversation with Dr Mariana Andrade (despite agreement on plan of care last session) since recommendation for repeat injection was  old news  and pt reporting that talking to the physician was  my [pt s] job.   Given lack of progress, recommend discontinue PT and f/u with physician accordingly.    Please refer to the daily flowsheet for treatment today, total treatment time and time  spent performing 1:1 timed codes.

## 2017-03-08 ENCOUNTER — OFFICE VISIT (OUTPATIENT)
Dept: FAMILY MEDICINE | Facility: CLINIC | Age: 62
End: 2017-03-08
Payer: MEDICARE

## 2017-03-08 VITALS
DIASTOLIC BLOOD PRESSURE: 82 MMHG | BODY MASS INDEX: 35.5 KG/M2 | HEART RATE: 85 BPM | OXYGEN SATURATION: 96 % | WEIGHT: 248 LBS | SYSTOLIC BLOOD PRESSURE: 124 MMHG | RESPIRATION RATE: 26 BRPM | HEIGHT: 70 IN | TEMPERATURE: 98.5 F

## 2017-03-08 DIAGNOSIS — N18.30 CHRONIC KIDNEY DISEASE, STAGE III (MODERATE) (H): ICD-10-CM

## 2017-03-08 DIAGNOSIS — E78.5 HYPERLIPIDEMIA LDL GOAL <100: ICD-10-CM

## 2017-03-08 DIAGNOSIS — N18.30 TYPE 2 DIABETES MELLITUS WITH STAGE 3 CHRONIC KIDNEY DISEASE, WITH LONG-TERM CURRENT USE OF INSULIN (H): Primary | ICD-10-CM

## 2017-03-08 DIAGNOSIS — Z23 ENCOUNTER FOR IMMUNIZATION: ICD-10-CM

## 2017-03-08 DIAGNOSIS — G47.00 INSOMNIA, UNSPECIFIED TYPE: ICD-10-CM

## 2017-03-08 DIAGNOSIS — E66.01 MORBID OBESITY DUE TO EXCESS CALORIES (H): ICD-10-CM

## 2017-03-08 DIAGNOSIS — E03.9 ACQUIRED HYPOTHYROIDISM: ICD-10-CM

## 2017-03-08 DIAGNOSIS — Z79.4 TYPE 2 DIABETES MELLITUS WITH STAGE 3 CHRONIC KIDNEY DISEASE, WITH LONG-TERM CURRENT USE OF INSULIN (H): Primary | ICD-10-CM

## 2017-03-08 DIAGNOSIS — M51.369 DDD (DEGENERATIVE DISC DISEASE), LUMBAR: ICD-10-CM

## 2017-03-08 DIAGNOSIS — E11.22 TYPE 2 DIABETES MELLITUS WITH STAGE 3 CHRONIC KIDNEY DISEASE, WITH LONG-TERM CURRENT USE OF INSULIN (H): Primary | ICD-10-CM

## 2017-03-08 LAB — HBA1C MFR BLD: 8.7 % (ref 4.3–6)

## 2017-03-08 PROCEDURE — 99214 OFFICE O/P EST MOD 30 MIN: CPT | Mod: 25 | Performed by: FAMILY MEDICINE

## 2017-03-08 PROCEDURE — 83036 HEMOGLOBIN GLYCOSYLATED A1C: CPT | Performed by: FAMILY MEDICINE

## 2017-03-08 PROCEDURE — G0010 ADMIN HEPATITIS B VACCINE: HCPCS | Performed by: FAMILY MEDICINE

## 2017-03-08 PROCEDURE — 36415 COLL VENOUS BLD VENIPUNCTURE: CPT | Performed by: FAMILY MEDICINE

## 2017-03-08 PROCEDURE — 90746 HEPB VACCINE 3 DOSE ADULT IM: CPT | Performed by: FAMILY MEDICINE

## 2017-03-08 RX ORDER — TRAZODONE HYDROCHLORIDE 50 MG/1
TABLET, FILM COATED ORAL
Qty: 25 TABLET | Refills: 1 | Status: SHIPPED | OUTPATIENT
Start: 2017-03-08 | End: 2017-04-24

## 2017-03-08 RX ORDER — GLIMEPIRIDE 1 MG/1
3 TABLET ORAL 2 TIMES DAILY WITH MEALS
Qty: 90 TABLET | Refills: 3 | Status: SHIPPED | OUTPATIENT
Start: 2017-03-08 | End: 2017-05-08

## 2017-03-08 NOTE — PATIENT INSTRUCTIONS
[facility]    If you have any questions regarding to your visit please contact your care team:       Team Red:   Clinic Hours Telephone Number   Dr. Twyla Hutson, NP   7am-7pm  Monday - Thursday   7am-5pm  Fridays  [phone]  (Appointment scheduling available 24/7)    Questions about your visit?   Team Line  [phone]   Urgent [facility] - [place] - 11am-9pm Monday-Friday Saturday-Sunday- 9am-5pm   [place] - 5pm-9pm Monday-Friday Saturday-Sunday- 9am-5pm  [phone] - [place]   [phone] - [place]       What options do I have for visits at the clinic other than the traditional office visit?  To expand how we care for you, many of our providers are utilizing electronic visits (e-visits) and telephone visits, when medically appropriate, for interactions with their patients rather than a visit in the clinic.   We also offer nurse visits for many medical concerns. Just like any other service, we will bill your insurance company for this type of visit based on time spent on the phone with your provider. Not all insurance companies cover these visits. Please check with your medical insurance if this type of visit is covered. You will be responsible for any charges that are not paid by your insurance.      E-visits via appbackr:  generally incur a $35.00 fee.  Telephone visits:  Time spent on the phone: *charged based on time that is spent on the phone in increments of 10 minutes. Estimated cost:   5-10 mins $30.00   11-20 mins. $59.00   21-30 mins. $85.00     Use [employer] (secure email communication and access to your chart) to send your primary care provider a message or make an appointment. Ask someone on your Team how to sign up for appbackr.  For a Price Quote for your services, please call our Consumer Price Line at [phone].      As always, Thank you for trusting us with your health care needs!  Cullen HERNANDEZ  [place]      Insomnia  Insomnia refers to a difficulty going to sleep or staying asleep, or both. Insomnia has many causes, including anxiety, stress, depression, chronic pain, sleeping cycles out of balance due to working night shifts or excess napping during the day, and a condition called  sleep apnea . Insomnia can be a side effect from stimulant medicines such as decongestants, asthma inhalers and pills, diet pills, and illegal drugs such as speed, crank, crack, and PCP.  Home Care:  1. Review your medicines with your doctor or pharmacist to find out if they can cause insomnia.  2. Caffeine, smoking and alcohol also affect sleep. Limit your daily use and do not use these before bedtime. Alcohol may make you sleepy at first, but as its effects wear off, you may awaken a few hours later and have trouble returning to sleep.  3. Do not exercise, eat or drink large amounts of liquid within 2 hours of your bedtime.  4. Improve your sleep habits. Have a fixed bed and wake-up time. Try to keep noise, light and heat in your bedroom at a comfortable level. Try using earplugs or eyeshades if needed.   5. Avoid watching TV in bed.  6. If you do not fall asleep within 30 minutes, try to relax by reading or listening to soft music.  7. Limit daytime napping to one 30 minute period, early in the day.  8. Get regular exercise. Find other ways to lessen your stress level.  9. If a medicine was prescribed to help reset your sleep patterns, take it as directed. Sleeping pills are intended for short-term use, only. If taken for too long, the effect wears off while the risk of physical addiction and psychological dependence increases.  Follow-Up  with your doctor or as directed by our staff if you feel that your insomnia is not responding to the above measures.  Get Prompt Medical Attention  if any of the following occur:    Extreme restlessness or irritability    Confusion or hallucinations (seeing or hearing things that are not  there)    Anxiety, depression    Several days without sleeping    5834-5908 [employer]. [address] [place] [address], [place] [address]. All rights reserved. This information is not intended as a substitute for professional medical care. Always follow your healthcare professional's instructions.

## 2017-03-08 NOTE — LETTER
[address] [place] [address]    March 8, 2017    Sandra Dos Santos  [address] [place] [id number]          Dear Sandra,    Diabetic test is High-Please send me Your sugar readings in 2 weeks    Enclosed is a copy of your results.     Results for orders placed or performed in visit on 03/08/17   Hemoglobin A1c   Result Value Ref Range    Hemoglobin A1C 8.7 (H) 4.3 - 6.0 %       If you have any questions or concerns, please call myself or my nurse at [phone].      Sincerely,        Deborah Clemons MD/MORRIS

## 2017-03-08 NOTE — PROGRESS NOTES
SUBJECTIVE:                                                    Sandra Dos Santos is a 62 year old male who presents to clinic today for the following health issues:      Diabetes Follow-up      Patient is checking blood sugars: rarely.  Results range from 85 to 320    Diabetic concerns: blood sugar frequently over 200     Symptoms of hypoglycemia (low blood sugar): none     Paresthesias (numbness or burning in feet) or sores: Yes feet      Date of last diabetic eye exam: within the last year        Amount of exercise or physical activity: None    Problems taking medications regularly: No    Medication side effects: none  Diet:diabetic    Hyperlipidemia Follow-Up      Rate your low fat/cholesterol diet?: good    Taking statin?  Yes, no muscle aches from statin    Other lipid medications/supplements?:  none     Hypertension Follow-up      Outpatient blood pressures are not being checked.    Low Salt Diet: no added salt   Pt has DDD and Goes to Pain clinic  Problem list and histories reviewed & adjusted, as indicated.  Additional history: as documented    Patient Active Problem List   Diagnosis     HYPERLIPIDEMIA LDL GOAL <100     Chronic kidney disease, stage III (moderate)     Gastroesophageal reflux disease     Hypothyroidism     Urinary hesitancy     Type 2 diabetes mellitus with stage 3 chronic kidney disease, with long-term current use of insulin (H)     Chronic pain syndrome     Morbid obesity due to excess calories (H)     Intermittent asthma, uncomplicated     Benign prostatic hyperplasia, presence of lower urinary tract symptoms unspecified, unspecified morphology     Gastroesophageal reflux disease without esophagitis     Hypertension goal BP (blood pressure) < 140/90     Lumbar stenosis     DDD (degenerative disc disease), lumbar     Osteoarthritis of lumbar spine, unspecified spinal osteoarthritis complication status     Facet arthropathy (H)     Osteoarthritis of spine with radiculopathy, lumbosacral region      Other spondylosis, lumbosacral region     Past Surgical History   Procedure Laterality Date     Joint replacemtn, knee rt/lt  2013  ,2012     Joint Replacement knee RT/LT     Arthroscopy shoulder rt/lt Right        Social History   Substance Use Topics     Smoking status: Never Smoker     Smokeless tobacco: Never Used     Alcohol use No     Family History   Problem Relation Age of Onset     DIABETES Father      Prostate Cancer No family hx of      Colon Cancer No family hx of      Breast Cancer No family hx of          Current Outpatient Prescriptions   Medication Sig Dispense Refill     glimepiride (AMARYL) 1 MG tablet Take 3 tablets (3 mg) by mouth 2 times daily (with meals) 90 tablet 3     traZODone (DESYREL) 50 MG tablet 1/2 to 1 tablet at Night 25 tablet 1     traMADol (ULTRAM) 50 MG tablet Take 1-2 tablets ( mg) by mouth every 6 hours as needed for pain maximum 4 tablet(s) per day 120 tablet 1     glimepiride (AMARYL) 2 MG tablet Take 1 tablet (2 mg) by mouth 2 times daily With food 180 tablet 1     pregabalin (LYRICA) 150 MG capsule Take 1 capsule (150 mg) by mouth 2 times daily 60 capsule 1     lidocaine-prilocaine (EMLA) cream Apply topically as needed for moderate pain 5800 g 1     atorvastatin (LIPITOR) 10 MG tablet Take 1 tablet (10 mg) by mouth daily 90 tablet 3     insulin glargine U-300 (TOUJEO SOLOSTAR) 300 UNIT/ML injection Inject 14 Units Subcutaneous At Bedtime 3 Month 1     cyclobenzaprine (FLEXERIL) 10 MG tablet Take 0.5-1 tablets (5-10 mg) by mouth 3 times daily as needed for muscle spasms 30 tablet 1     INVOKANA 100 MG tablet Take 1 tablet (100 mg) by mouth every morning (before breakfast) 90 tablet 1     tamsulosin (FLOMAX) 0.4 MG capsule Take 1 capsule (0.4 mg) by mouth daily 90 capsule 3     ranitidine (ZANTAC) 150 MG tablet Take 1 tablet (150 mg) by mouth 2 times daily 180 tablet 1     albuterol (PROAIR HFA/PROVENTIL HFA/VENTOLIN HFA) 108 (90 BASE) MCG/ACT Inhaler Inhale 2 puffs  into the lungs every 4 hours as needed for shortness of breath / dyspnea or wheezing 1 Inhaler 11     order for DME Test strips for pt's glucometer, brand as covered by insurance. Test four times daily and prn. 400 each 4     losartan (COZAAR) 25 MG tablet Take 1 tablet (25 mg) by mouth daily 90 tablet 3     GLOBAL EASE INJECT PEN NEEDLES 31G X 5 MM   3     ACCU-CHEK ULISES PLUS test strip   2     cholecalciferol (VITAMIN D3) 5000 UNITS TABS tablet Take by mouth daily       thiamine 100 MG tablet Take 100 mg by mouth       cyclobenzaprine (FLEXERIL) 10 MG tablet Take 0.5-1 tablets (5-10 mg) by mouth 3 times daily as needed for muscle spasms 30 tablet 1     Allergies   Allergen Reactions     Asa [Aspirin] Other (See Comments)     dizzy     Recent Labs   Lab Test  03/08/17   1251  01/04/17   0757  11/29/16   1101  10/15/12   1108  09/15/10   1232   A1C  8.7*  7.8*  8.1*   --   7.3*   LDL   --   73  122*   --   77   HDL   --   50  45   --   51   TRIG   --   197*  226*   --   167*   ALT   --    --   26  18   --    CR   --    --   1.22  0.80   --    GFRESTIMATED   --    --   60*  >90   --    GFRESTBLACK   --    --   73  >90   --    POTASSIUM   --    --   4.6  4.2   --    TSH   --    --   2.58  1.16   --       BP Readings from Last 3 Encounters:   03/08/17 124/82   02/02/17 132/70   01/10/17 126/72    Wt Readings from Last 3 Encounters:   03/08/17 248 lb (112.5 kg)   02/02/17 253 lb (114.8 kg)   01/04/17 258 lb (117 kg)                  Labs reviewed in EPIC    Reviewed and updated as needed this visit by clinical staff  Tobacco  Allergies  Meds  Problems  Med Hx  Surg Hx  Fam Hx  Soc Hx        Reviewed and updated as needed this visit by Provider  Problems         ROS:  C: NEGATIVE for fever, chills, change in weight  R: NEGATIVE for significant cough or SOB  CV: NEGATIVE for chest pain, palpitations or peripheral edema  GI: NEGATIVE for nausea, abdominal pain, heartburn, or change in bowel  "habits  MUSCULOSKELETAL: DDD  NEURO: NEGATIVE for weakness, dizziness or paresthesias    OBJECTIVE:                                                    /82 (BP Location: Left arm, Patient Position: Chair, Cuff Size: Adult Regular)  Pulse 85  Temp 98.5  F (36.9  C) (Oral)  Resp 26  Ht 5' 9.5\" (1.765 m)  Wt 248 lb (112.5 kg)  SpO2 96%  BMI 36.1 kg/m2  Body mass index is 36.1 kg/(m^2).  GENERAL: healthy, alert and no distress  GENERAL: obese  NECK: no adenopathy, no asymmetry, masses, or scars and thyroid normal to palpation  RESP: lungs clear to auscultation - no rales, rhonchi or wheezes  CV: regular rate and rhythm, normal S1 S2, no S3 or S4, no murmur, click or rub, no peripheral edema and peripheral pulses strong  ABDOMEN: soft, nontender, no hepatosplenomegaly, no masses and bowel sounds normal  MS: no gross musculoskeletal defects noted, no edema    Diagnostic Test Results:  Results for orders placed or performed in visit on 03/08/17 (from the past 24 hour(s))   Hemoglobin A1c   Result Value Ref Range    Hemoglobin A1C 8.7 (H) 4.3 - 6.0 %        ASSESSMENT/PLAN:                                                        BMI:   Estimated body mass index is 36.1 kg/(m^2) as calculated from the following:    Height as of this encounter: 5' 9.5\" (1.765 m).    Weight as of this encounter: 248 lb (112.5 kg).   Weight management plan: Low abigail diet      1. Type 2 diabetes mellitus with stage 3 chronic kidney disease, with long-term current use of insulin (H)  Advised that he increase Amaryl to 3 mg BID  Strict Diabetic diet  - Hemoglobin A1c  - glimepiride (AMARYL) 1 MG tablet; Take 3 tablets (3 mg) by mouth 2 times daily (with meals)  Dispense: 90 tablet; Refill: 3  Call accuchecks to me in 2 weeks  2. Chronic kidney disease, stage III (moderate)  He sees Dr Mcdowell-Referral done  - NEPHROLOGY ADULT REFERRAL    3. Encounter for immunization  Advised   - HEPATITIS B VACCINE,ADULT,IM    4. Insomnia, unspecified type  Pt " has Not been able to sleep at Night  - traZODone (DESYREL) 50 MG tablet; 1/2 to 1 tablet at Night  Dispense: 25 tablet; Refill: 1  SEE [facility] orders  The potential side effects of this medication have been discussed with the patient.  Call if any significant problems with these are experienced.      5. Morbid obesity due to excess calories (H)  Low abigail diet    6. Acquired hypothyroidism  done    7. Hyperlipidemia LDL goal <100  At goal    8. DDD (degenerative disc disease), lumbar  Sees Pain clinic    Follow up 2 months  Call acuchecks in 2 weeks  Deborah Leslie MD  [facility]

## 2017-03-08 NOTE — MR AVS SNAPSHOT
After Visit Summary   3/8/2017    Sandra Dos Santos    MRN: [id number]           Patient Information     Date Of Birth          1955        Visit Information        Provider Department      3/8/2017 12:15 PM Open, Assignments; Deborah Leslie MD [facility]        Today's Diagnoses     Type 2 diabetes mellitus with stage 3 chronic kidney disease, with long-term current use of insulin (H)    -  1    Chronic kidney disease, stage III (moderate)        Encounter for immunization        Insomnia, unspecified type          Care Instructions    [facility]    If you have any questions regarding to your visit please contact your care team:       Team Red:   Clinic Hours Telephone Number   Dr. Twyla Hutson, NP   7am-7pm  Monday - Thursday   7am-5pm  Fridays  [phone]  (Appointment scheduling available 24/7)    Questions about your visit?   Team Line  [phone]   Urgent Care - [place] and [facility] - 11am-9pm Monday-Friday Saturday-Sunday- 9am-5pm   [place] - 5pm-9pm Monday-Friday Saturday-Sunday- 9am-5pm  [phone] - [place]   [phone] - [place]       What options do I have for visits at the clinic other than the traditional office visit?  To expand how we care for you, many of our providers are utilizing electronic visits (e-visits) and telephone visits, when medically appropriate, for interactions with their patients rather than a visit in the clinic.   We also offer nurse visits for many medical concerns. Just like any other service, we will bill your insurance company for this type of visit based on time spent on the phone with your provider. Not all insurance companies cover these visits. Please check with your medical insurance if this type of visit is covered. You will be responsible for any charges that are not paid by your insurance.      E-visits via [employer]:  generally incur a $35.00  fee.  Telephone visits:  Time spent on the phone: *charged based on time that is spent on the phone in increments of 10 minutes. Estimated cost:   5-10 mins $30.00   11-20 mins. $59.00   21-30 mins. $85.00     Use [employer] (secure email communication and access to your chart) to send your primary care provider a message or make an appointment. Ask someone on your Team how to sign up for Declara.  For a Price Quote for your services, please call our Ast[employer] Line at [phone].      As always, Thank you for trusting us with your health care needs!  Cullen Madrid      Insomnia  Insomnia refers to a difficulty going to sleep or staying asleep, or both. Insomnia has many causes, including anxiety, stress, depression, chronic pain, sleeping cycles out of balance due to working night shifts or excess napping during the day, and a condition called  sleep apnea . Insomnia can be a side effect from stimulant medicines such as decongestants, asthma inhalers and pills, diet pills, and illegal drugs such as speed, crank, crack, and PCP.  Home Care:  1. Review your medicines with your doctor or pharmacist to find out if they can cause insomnia.  2. Caffeine, smoking and alcohol also affect sleep. Limit your daily use and do not use these before bedtime. Alcohol may make you sleepy at first, but as its effects wear off, you may awaken a few hours later and have trouble returning to sleep.  3. Do not exercise, eat or drink large amounts of liquid within 2 hours of your bedtime.  4. Improve your sleep habits. Have a fixed bed and wake-up time. Try to keep noise, light and heat in your bedroom at a comfortable level. Try using earplugs or eyeshades if needed.   5. Avoid watching TV in bed.  6. If you do not fall asleep within 30 minutes, try to relax by reading or listening to soft music.  7. Limit daytime napping to one 30 minute period, early in the day.  8. Get regular exercise. Find other ways to lessen your stress  level.  9. If a medicine was prescribed to help reset your sleep patterns, take it as directed. Sleeping pills are intended for short-term use, only. If taken for too long, the effect wears off while the risk of physical addiction and psychological dependence increases.  Follow-Up  with your doctor or as directed by our staff if you feel that your insomnia is not responding to the above measures.  Get Prompt Medical Attention  if any of the following occur:    Extreme restlessness or irritability    Confusion or hallucinations (seeing or hearing things that are not there)    Anxiety, depression    Several days without sleeping    2232-5242 [employer]. [address] [place] [address]. All rights reserved. This information is not intended as a substitute for professional medical care. Always follow your healthcare professional's instructions.              Follow-ups after your visit        Additional Services     NEPHROLOGY ADULT REFERRAL       Your provider has referred you to: Dr Mcdowell    Please be aware that coverage of these services is subject to the terms and limitations of your health insurance plan.  Call member services at your health plan with any benefit or coverage questions.      Reason for referral:  Follow up Dr Mcdowell-Pt has been seeing him  Please bring the following to your appointment:    >>   Any x-rays, CTs or MRIs which have been performed.  Contact the facility where they were done to arrange for  prior to your scheduled appointment.   >>   List of current medications   >>   This referral request   >>   Any documents/labs given to you for this referral                  Your next 10 appointments already scheduled     Mar 22, 2017  9:30 AM CDT   Return Visit with Simon Andrade MD   [facility] [place] ([facility] [place])    [address]  [place] [address] [phone]              Who to contact     If you have questions  "or need follow up information about today's clinic visit or your schedule please contact [facility] [place] directly at [phone].  Normal or non-critical lab and imaging results will be communicated to you by MyChart, letter or phone within 4 business days after the clinic has received the results. If you do not hear from us within 7 days, please contact the clinic through [employer] or phone. If you have a critical or abnormal lab result, we will notify you by phone as soon as possible.  Submit refill requests through [email or web address] or call your pharmacy and they will forward the refill request to us. Please allow 3 business days for your refill to be completed.          Additional Information About Your Visit        [employer] [email or web address] Information     [email or web address] lets you send messages to your doctor, view your test results, renew your prescriptions, schedule appointments and more. To sign up, go to [email or web address] . Click on \"Log in\" on the left side of the screen, which will take you to the Welcome page. Then click on \"Sign up Now\" on the right side of the page.     You will be asked to enter the access code listed below, as well as some personal information. Please follow the directions to create your username and password.     Your access code is: [email or web address]  Expires: 2017  1:49 PM     Your access code will  in 90 days. If you need help or a new code, please call your [facility] clinic or [phone].        Care EveryWhere ID     This is your Care EveryWhere ID. This could be used by other organizations to access your [facility] medical records  [id number]        Your Vitals Were     Pulse Temperature Respirations Height Pulse Oximetry BMI (Body Mass Index)    85 98.5  F (36.9  C) (Oral) 26 5' 9.5\" (1.765 m) 96% 36.1 kg/m2       Blood Pressure from Last 3 Encounters:   17 124/82   17 132/70   01/10/17 126/72    Weight from Last 3 Encounters:   17 248 lb (112.5 kg)   17 253 lb (114.8 " kg)   01/04/17 258 lb (117 kg)              We Performed the Following     Hemoglobin A1c     HEPATITIS B VACCINE,ADULT,IM     NEPHROLOGY ADULT REFERRAL          Today's Medication Changes          These changes are accurate as of: 3/8/17  1:49 PM.  If you have any questions, ask your nurse or doctor.               Start taking these medicines.        Dose/Directions    traZODone 50 MG tablet   Commonly known as:  DESYREL   Used for:  Insomnia, unspecified type   Started by:  Deborah Leslie MD        1/2 to 1 tablet at Night   Quantity:  25 tablet   Refills:  1         These medicines have changed or have updated prescriptions.        Dose/Directions    * glimepiride 2 MG tablet   Commonly known as:  AMARYL   This may have changed:  Another medication with the same name was added. Make sure you understand how and when to take each.   Used for:  Type 2 diabetes mellitus with stage 3 chronic kidney disease, with long-term current use of insulin (H)   Changed by:  Deborah Leslie MD        Dose:  2 mg   Take 1 tablet (2 mg) by mouth 2 times daily With food   Quantity:  180 tablet   Refills:  1       * glimepiride 1 MG tablet   Commonly known as:  AMARYL   This may have changed:  You were already taking a medication with the same name, and this prescription was added. Make sure you understand how and when to take each.   Used for:  Type 2 diabetes mellitus with stage 3 chronic kidney disease, with long-term current use of insulin (H)   Changed by:  Deborah eLslie MD        Dose:  3 mg   Take 3 tablets (3 mg) by mouth 2 times daily (with meals)   Quantity:  90 tablet   Refills:  3       * Notice:  This list has 2 medication(s) that are the same as other medications prescribed for you. Read the directions carefully, and ask your doctor or other care provider to review them with you.         Where to get your medicines      These medications were sent to [facility] [place]  [place], [place] - [address]  [address]  [place] [address]     Phone:  [phone]     glimepiride 1 MG tablet    traZODone 50 MG tablet                Primary Care Provider    Physician No Ref-Primary       No address on file        Thank you!     Thank you for choosing [facility]  for your care. Our goal is always to provide you with excellent care. Hearing back from our patients is one way we can continue to improve our services. Please take a few minutes to complete the written survey that you may receive in the mail after your visit with us. Thank you!             Your Updated Medication List - Protect others around you: Learn how to safely use, store and throw away your medicines at www.disposemymeds.org.          This list is accurate as of: 3/8/17  1:49 PM.  Always use your most recent med list.                   Brand Name Dispense Instructions for use    ACCU-CHEK ULISES PLUS test strip   Generic drug:  blood glucose monitoring          albuterol 108 (90 BASE) MCG/ACT Inhaler    PROAIR HFA/PROVENTIL HFA/VENTOLIN HFA    1 Inhaler    Inhale 2 puffs into the lungs every 4 hours as needed for shortness of breath / dyspnea or wheezing       atorvastatin 10 MG tablet    LIPITOR    90 tablet    Take 1 tablet (10 mg) by mouth daily       cholecalciferol 5000 UNITS Tabs tablet    vitamin D3     Take by mouth daily       * cyclobenzaprine 10 MG tablet    FLEXERIL    30 tablet    Take 0.5-1 tablets (5-10 mg) by mouth 3 times daily as needed for muscle spasms       * cyclobenzaprine 10 MG tablet    FLEXERIL    30 tablet    Take 0.5-1 tablets (5-10 mg) by mouth 3 times daily as needed for muscle spasms       * glimepiride 2 MG tablet    AMARYL    180 tablet    Take 1 tablet (2 mg) by mouth 2 times daily With food       * glimepiride 1 MG tablet    AMARYL    90 tablet    Take 3 tablets (3 mg) by mouth 2 times daily (with meals)       GLOBAL EASE INJECT PEN NEEDLES 31G X 5 MM   Generic drug:  insulin pen needle          insulin glargine U-300  300 UNIT/ML injection    TOUJEO SOLOSTAR    3 Month    Inject 14 Units Subcutaneous At Bedtime       INVOKANA 100 MG tablet   Generic drug:  canagliflozin     90 tablet    Take 1 tablet (100 mg) by mouth every morning (before breakfast)       lidocaine-prilocaine cream    EMLA    5800 g    Apply topically as needed for moderate pain       losartan 25 MG tablet    COZAAR    90 tablet    Take 1 tablet (25 mg) by mouth daily       order for DME     400 each    Test strips for pt's glucometer, brand as covered by insurance. Test four times daily and prn.       pregabalin 150 MG capsule    LYRICA    60 capsule    Take 1 capsule (150 mg) by mouth 2 times daily       ranitidine 150 MG tablet    ZANTAC    180 tablet    Take 1 tablet (150 mg) by mouth 2 times daily       tamsulosin 0.4 MG capsule    FLOMAX    90 capsule    Take 1 capsule (0.4 mg) by mouth daily       thiamine 100 MG tablet      Take 100 mg by mouth       traMADol 50 MG tablet    ULTRAM    120 tablet    Take 1-2 tablets ( mg) by mouth every 6 hours as needed for pain maximum 4 tablet(s) per day       traZODone 50 MG tablet    DESYREL    25 tablet    1/2 to 1 tablet at Night       * Notice:  This list has 4 medication(s) that are the same as other medications prescribed for you. Read the directions carefully, and ask your doctor or other care provider to review them with you.

## 2017-03-15 DIAGNOSIS — G89.4 CHRONIC PAIN SYNDROME: ICD-10-CM

## 2017-03-15 RX ORDER — PREGABALIN 150 MG/1
150 CAPSULE ORAL 2 TIMES DAILY
Qty: 60 CAPSULE | Refills: 1 | Status: SHIPPED | OUTPATIENT
Start: 2017-03-15 | End: 2017-05-04

## 2017-03-15 NOTE — TELEPHONE ENCOUNTER
Lyrica 50 mg      Last Written Prescription Date:  01/04/17  Last Fill Quantity: 60,   # refills: 1  Last Office Visit with [facility], [facility] or [facility] prescribing provider: 03/08/17  Future Office visit:    Next 5 appointments (look out 90 days)     Mar 22, 2017  9:30 AM CDT   Return Visit with Simon Andrade MD   [facility] [place] ([facility] [place])    [address]  [place] [address]   [phone]                   Routing refill request to provider for review/approval because:  Drug not on the [facility], [facility] or [facility] refill protocol or controlled substance

## 2017-03-16 NOTE — TELEPHONE ENCOUNTER
Confirmed prescription faxed to Pharmacy. [address],     [facility] [place] [place] - [address]    pregabalin (LYRICA) 150 MG capsule

## 2017-03-22 ENCOUNTER — OFFICE VISIT (OUTPATIENT)
Dept: FAMILY MEDICINE | Facility: CLINIC | Age: 62
End: 2017-03-22
Payer: MEDICARE

## 2017-03-22 VITALS
HEART RATE: 90 BPM | WEIGHT: 250 LBS | OXYGEN SATURATION: 97 % | HEIGHT: 70 IN | BODY MASS INDEX: 35.79 KG/M2 | RESPIRATION RATE: 16 BRPM | DIASTOLIC BLOOD PRESSURE: 74 MMHG | SYSTOLIC BLOOD PRESSURE: 132 MMHG | TEMPERATURE: 98.2 F

## 2017-03-22 DIAGNOSIS — J45.901 ASTHMA EXACERBATION: Primary | ICD-10-CM

## 2017-03-22 DIAGNOSIS — N18.30 TYPE 2 DIABETES MELLITUS WITH STAGE 3 CHRONIC KIDNEY DISEASE, WITH LONG-TERM CURRENT USE OF INSULIN (H): ICD-10-CM

## 2017-03-22 DIAGNOSIS — Z79.4 TYPE 2 DIABETES MELLITUS WITH STAGE 3 CHRONIC KIDNEY DISEASE, WITH LONG-TERM CURRENT USE OF INSULIN (H): ICD-10-CM

## 2017-03-22 DIAGNOSIS — E78.5 HYPERLIPIDEMIA LDL GOAL <100: ICD-10-CM

## 2017-03-22 DIAGNOSIS — J01.90 ACUTE SINUSITIS, RECURRENCE NOT SPECIFIED, UNSPECIFIED LOCATION: ICD-10-CM

## 2017-03-22 DIAGNOSIS — E11.22 TYPE 2 DIABETES MELLITUS WITH STAGE 3 CHRONIC KIDNEY DISEASE, WITH LONG-TERM CURRENT USE OF INSULIN (H): ICD-10-CM

## 2017-03-22 PROCEDURE — 99214 OFFICE O/P EST MOD 30 MIN: CPT | Performed by: FAMILY MEDICINE

## 2017-03-22 RX ORDER — AZITHROMYCIN 250 MG/1
TABLET, FILM COATED ORAL
Qty: 6 TABLET | Refills: 0 | Status: SHIPPED | OUTPATIENT
Start: 2017-03-22 | End: 2017-05-31

## 2017-03-22 RX ORDER — PREDNISONE 10 MG/1
TABLET ORAL
Qty: 30 TABLET | Refills: 0 | Status: SHIPPED | OUTPATIENT
Start: 2017-03-22 | End: 2017-05-16

## 2017-03-22 RX ORDER — LIDOCAINE/PRILOCAINE 2.5 %-2.5%
CREAM (GRAM) TOPICAL PRN
Qty: 5800 G | Refills: 1 | Status: CANCELLED | OUTPATIENT
Start: 2017-03-22

## 2017-03-22 RX ORDER — TRAMADOL HYDROCHLORIDE 50 MG/1
50-100 TABLET ORAL EVERY 6 HOURS PRN
Qty: 120 TABLET | Refills: 1 | Status: CANCELLED | OUTPATIENT
Start: 2017-03-22

## 2017-03-22 RX ORDER — ALBUTEROL SULFATE 0.83 MG/ML
1 SOLUTION RESPIRATORY (INHALATION) EVERY 4 HOURS PRN
Qty: 25 VIAL | Refills: 1 | Status: SHIPPED | OUTPATIENT
Start: 2017-03-22 | End: 2017-05-16

## 2017-03-22 RX ORDER — CYCLOBENZAPRINE HCL 10 MG
5-10 TABLET ORAL 3 TIMES DAILY PRN
Qty: 30 TABLET | Refills: 1 | Status: CANCELLED | OUTPATIENT
Start: 2017-03-22

## 2017-03-22 RX ORDER — ALBUTEROL SULFATE 1.25 MG/3ML
1 SOLUTION RESPIRATORY (INHALATION) EVERY 6 HOURS PRN
COMMUNITY
End: 2017-05-30

## 2017-03-22 RX ORDER — CANAGLIFLOZIN 100 MG/1
100 TABLET, FILM COATED ORAL
Qty: 90 TABLET | Refills: 1 | Status: SHIPPED | OUTPATIENT
Start: 2017-03-22 | End: 2017-05-16

## 2017-03-22 NOTE — MR AVS SNAPSHOT
After Visit Summary   3/22/2017    Sandra Dos Santos    MRN: [id number]           Patient Information     Date Of Birth          1955        Visit Information        Provider Department      3/22/2017 11:45 AM Deborah Leslie MD; MULTILINGUAL WORD [facility]        Today's Diagnoses     Asthma exacerbation    -  1    Type 2 diabetes mellitus with stage 3 chronic kidney disease, with long-term current use of insulin (H)        Acute sinusitis, recurrence not specified, unspecified location          Care Instructions    [facility] [address]    If you have any questions regarding to your visit please contact your care team:       Team Red:   Clinic Hours Telephone Number   Dr. Twyla Lucio  (pediatrics)  Paula Hutson NP 7am-7pm  Monday - Thursday   7am-5pm  Fridays  [phone] (fax)    Luis Alberto KAUR  [phone]   Urgent Care - [place] and [place] Monday-Friday  [place] - 11am-8pm  Saturday-Sunday  Both sites - 9am-5pm  [phone] - [facility]  [phone] [facility]       What options do I have for visits at the clinic other than the traditional office visit?  To expand how we care for you, many of our providers are utilizing electronic visits (e-visits) and telephone visits, when medically appropriate, for interactions with their patients rather than a visit in the clinic.   We also offer nurse visits for many medical concerns. Just like any other service, we will bill your insurance company for this type of visit based on time spent on the phone with your provider. Not all insurance companies cover these visits. Please check with your medical insurance if this type of visit is covered. You will be responsible for any charges that are not paid by your insurance.      E-visits via [employer]:  generally incur a $35.00 fee.  Telephone visits:  Time spent on the phone: *charged based on time that is spent on the phone in  "increments of 10 minutes. Estimated cost:   5-10 mins $30.00   11-20 mins. $59.00   21-30 mins. $85.00     As always, Thank you for trusting us with your health care needs!    Please increase Lantus to 12-14 units at Night  Start Prednisone as advised  Please go to Emergency Room if worse  I will see you back in 10 days  Next Diabetic check 2 months  Please let us Know How Your sugars are doing in 2-3 weeks  Take care  Deborah Leslie MD                Discharge JANELL Todd  [facility]          Follow-ups after your visit        Your next 10 appointments already scheduled     May 04, 2017  8:30 AM CDT   Return Visit with Simon Andrade MD   [facility] ([facility])    [address] [address] [phone]              Who to contact     If you have questions or need follow up information about today's clinic visit or your schedule please contact [facility] directly at [phone].  Normal or non-critical lab and imaging results will be communicated to you by MyChart, letter or phone within 4 business days after the clinic has received the results. If you do not hear from us within 7 days, please contact the clinic through [email or web address] or phone. If you have a critical or abnormal lab result, we will notify you by phone as soon as possible.  Submit refill requests through [employer] or call your pharmacy and they will forward the refill request to us. Please allow 3 business days for your refill to be completed.          Additional Information About Your Visit        [employer] Information     [employer] lets you send messages to your doctor, view your test results, renew your prescriptions, schedule appointments and more. To sign up, go to [email or web address] . Click on \"Log in\" on the left side of the screen, which will take you to the Welcome page. Then click on \"Sign up Now\" on the right side of the page.     You will be asked to enter the " "access code listed below, as well as some personal information. Please follow the directions to create your username and password.     Your access code is: [email or web address]  Expires: 2017  2:49 PM     Your access code will  in 90 days. If you need help or a new code, please call your [facility] or [phone].        Care EveryWhere ID     This is your Care EveryWhere ID. This could be used by other organizations to access your [place] medical records  [id number]        Your Vitals Were     Pulse Temperature Respirations Height Pulse Oximetry BMI (Body Mass Index)    90 98.2  F (36.8  C) 16 5' 9.5\" (1.765 m) 97% 36.39 kg/m2       Blood Pressure from Last 3 Encounters:   17 132/74   17 124/82   17 132/70    Weight from Last 3 Encounters:   17 250 lb (113.4 kg)   17 248 lb (112.5 kg)   17 253 lb (114.8 kg)              Today, you had the following     No orders found for display         Today's Medication Changes          These changes are accurate as of: 3/22/17 12:42 PM.  If you have any questions, ask your nurse or doctor.               Start taking these medicines.        Dose/Directions    azithromycin 250 MG tablet   Commonly known as:  ZITHROMAX   Used for:  Acute sinusitis, recurrence not specified, unspecified location   Started by:  Deborah Leslie MD        Two tablets first day, then one tablet daily for four days.   Quantity:  6 tablet   Refills:  0       predniSONE 10 MG tablet   Commonly known as:  DELTASONE   Used for:  Asthma exacerbation   Started by:  Deborah Leslie MD        40 mg daily x 3 days 30 mg daily x 3 days 20 mg daily x 3 days 10 mg daily x 3 days   Quantity:  30 tablet   Refills:  0         These medicines have changed or have updated prescriptions.        Dose/Directions    * albuterol 1.25 MG/3ML nebulizer solution   Commonly known as:  ACCUNEB   This may have changed:  Another medication with the same name was added. Make sure you " understand how and when to take each.   Changed by:  Deborah Leslie MD        Dose:  1 vial   Take 1 vial by nebulization every 6 hours as needed for shortness of breath / dyspnea or wheezing   Refills:  0       * albuterol 108 (90 BASE) MCG/ACT Inhaler   Commonly known as:  PROAIR HFA/PROVENTIL HFA/VENTOLIN HFA   This may have changed:  Another medication with the same name was added. Make sure you understand how and when to take each.   Used for:  Intermittent asthma, uncomplicated   Changed by:  Deborah Leslie MD        Dose:  2 puff   Inhale 2 puffs into the lungs every 4 hours as needed for shortness of breath / dyspnea or wheezing   Quantity:  1 Inhaler   Refills:  11       * albuterol (2.5 MG/3ML) 0.083% neb solution   This may have changed:  You were already taking a medication with the same name, and this prescription was added. Make sure you understand how and when to take each.   Used for:  Asthma exacerbation   Changed by:  Deborah Leslie MD        Dose:  1 vial   Take 1 vial (2.5 mg) by nebulization every 4 hours as needed for shortness of breath / dyspnea or wheezing   Quantity:  25 vial   Refills:  1       * Notice:  This list has 3 medication(s) that are the same as other medications prescribed for you. Read the directions carefully, and ask your doctor or other care provider to review them with you.         Where to get your medicines      These medications were sent to [facility], [place] - [address]  [address], [facility] [address]     Phone:  [phone]     albuterol (2.5 MG/3ML) 0.083% neb solution    azithromycin 250 MG tablet    INVOKANA 100 MG tablet         Some of these will need a paper prescription and others can be bought over the counter.  Ask your nurse if you have questions.     Bring a paper prescription for each of these medications     predniSONE 10 MG tablet                Primary Care Provider    Physician No Ref-Primary       No address on file         Thank you!     Thank you for choosing [facility]  for your care. Our goal is always to provide you with excellent care. Hearing back from our patients is one way we can continue to improve our services. Please take a few minutes to complete the written survey that you may receive in the mail after your visit with us. Thank you!             Your Updated Medication List - Protect others around you: Learn how to safely use, store and throw away your medicines at www.disposemymeds.org.          This list is accurate as of: 3/22/17 12:42 PM.  Always use your most recent med list.                   Brand Name Dispense Instructions for use    ACCU-CHEK ULISES PLUS test strip   Generic drug:  blood glucose monitoring          * albuterol 1.25 MG/3ML nebulizer solution    ACCUNEB     Take 1 vial by nebulization every 6 hours as needed for shortness of breath / dyspnea or wheezing       * albuterol 108 (90 BASE) MCG/ACT Inhaler    PROAIR HFA/PROVENTIL HFA/VENTOLIN HFA    1 Inhaler    Inhale 2 puffs into the lungs every 4 hours as needed for shortness of breath / dyspnea or wheezing       * albuterol (2.5 MG/3ML) 0.083% neb solution     25 vial    Take 1 vial (2.5 mg) by nebulization every 4 hours as needed for shortness of breath / dyspnea or wheezing       atorvastatin 10 MG tablet    LIPITOR    90 tablet    Take 1 tablet (10 mg) by mouth daily       azithromycin 250 MG tablet    ZITHROMAX    6 tablet    Two tablets first day, then one tablet daily for four days.       cholecalciferol 5000 UNITS Tabs tablet    vitamin D3     Take by mouth daily       cyclobenzaprine 10 MG tablet    FLEXERIL    30 tablet    Take 0.5-1 tablets (5-10 mg) by mouth 3 times daily as needed for muscle spasms       glimepiride 1 MG tablet    AMARYL    90 tablet    Take 3 tablets (3 mg) by mouth 2 times daily (with meals)       GLOBAL EASE INJECT PEN NEEDLES 31G X 5 MM   Generic drug:  insulin pen needle          insulin glargine U-300  300 UNIT/ML injection    TOUJEO SOLOSTAR    3 Month    Inject 14 Units Subcutaneous At Bedtime       INVOKANA 100 MG tablet   Generic drug:  canagliflozin     90 tablet    Take 1 tablet (100 mg) by mouth every morning (before breakfast)       lidocaine-prilocaine cream    EMLA    5800 g    Apply topically as needed for moderate pain       losartan 25 MG tablet    COZAAR    90 tablet    Take 1 tablet (25 mg) by mouth daily       order for DME     400 each    Test strips for pt's glucometer, brand as covered by insurance. Test four times daily and prn.       predniSONE 10 MG tablet    DELTASONE    30 tablet    40 mg daily x 3 days 30 mg daily x 3 days 20 mg daily x 3 days 10 mg daily x 3 days       pregabalin 150 MG capsule    LYRICA    60 capsule    Take 1 capsule (150 mg) by mouth 2 times daily       ranitidine 150 MG tablet    ZANTAC    180 tablet    Take 1 tablet (150 mg) by mouth 2 times daily       tamsulosin 0.4 MG capsule    FLOMAX    90 capsule    Take 1 capsule (0.4 mg) by mouth daily       thiamine 100 MG tablet      Take 100 mg by mouth       traMADol 50 MG tablet    ULTRAM    120 tablet    Take 1-2 tablets ( mg) by mouth every 6 hours as needed for pain maximum 4 tablet(s) per day       traZODone 50 MG tablet    DESYREL    25 tablet    1/2 to 1 tablet at Night       * Notice:  This list has 3 medication(s) that are the same as other medications prescribed for you. Read the directions carefully, and ask your doctor or other care provider to review them with you.

## 2017-03-22 NOTE — NURSING NOTE
"No chief complaint on file.      Initial /74  Pulse 90  Temp 98.2  F (36.8  C)  Resp 16  Ht 5' 9.5\" (1.765 m)  Wt 250 lb (113.4 kg)  SpO2 97%  BMI 36.39 kg/m2 Estimated body mass index is 36.39 kg/(m^2) as calculated from the following:    Height as of this encounter: 5' 9.5\" (1.765 m).    Weight as of this encounter: 250 lb (113.4 kg).  Medication Reconciliation: complete     Judi Todd. MA      "

## 2017-03-22 NOTE — PROGRESS NOTES
SUBJECTIVE:                                                    Sandra Dos Santos is a 62 year old male who presents to clinic today for the following health issues:      Acute Illness   Acute illness concerns: cough 5 days  Onset:     Fever: no     Chills/Sweats: no     Headache (location?): no     Sinus Pressure:YES    Conjunctivitis:  no    Ear Pain: no    Rhinorrhea: no     Congestion: YES    Sore Throat: no      Cough: YES nonprodutive     Wheeze: YES    Decreased Appetite: no     Nausea: no     Vomiting: no    Diarrhea:  no    Dysuria/Freq.: no    Fatigue/Achiness: YES    Sick/Strep Exposure: no     Therapies Tried and outcome: albuterol neb    Pt has Known asthma abd needing to use albuterol several Times  He also says His accuchecks are in the 180-200 range after eating  He has decreased his Insulin to 10 units  Since he increased amaryl      Problem list and histories reviewed & adjusted, as indicated.  Additional history: as documented    Patient Active Problem List   Diagnosis     HYPERLIPIDEMIA LDL GOAL <100     Chronic kidney disease, stage III (moderate)     Gastroesophageal reflux disease     Hypothyroidism     Urinary hesitancy     Type 2 diabetes mellitus with stage 3 chronic kidney disease, with long-term current use of insulin (H)     Chronic pain syndrome     Morbid obesity due to excess calories (H)     Intermittent asthma, uncomplicated     Benign prostatic hyperplasia, presence of lower urinary tract symptoms unspecified, unspecified morphology     Gastroesophageal reflux disease without esophagitis     Hypertension goal BP (blood pressure) < 140/90     Lumbar stenosis     DDD (degenerative disc disease), lumbar     Osteoarthritis of lumbar spine, unspecified spinal osteoarthritis complication status     Facet arthropathy (H)     Osteoarthritis of spine with radiculopathy, lumbosacral region     Other spondylosis, lumbosacral region     Past Surgical History:   Procedure Laterality Date      ARTHROSCOPY SHOULDER RT/LT Right      JOINT REPLACEMTN, KNEE RT/LT  2013  ,2012    Joint Replacement knee RT/LT       Social History   Substance Use Topics     Smoking status: Never Smoker     Smokeless tobacco: Never Used     Alcohol use No     Family History   Problem Relation Age of Onset     DIABETES Father      Prostate Cancer No family hx of      Colon Cancer No family hx of      Breast Cancer No family hx of          Current Outpatient Prescriptions   Medication Sig Dispense Refill     albuterol (ACCUNEB) 1.25 MG/3ML nebulizer solution Take 1 vial by nebulization every 6 hours as needed for shortness of breath / dyspnea or wheezing       INVOKANA 100 MG tablet Take 1 tablet (100 mg) by mouth every morning (before breakfast) 90 tablet 1     predniSONE (DELTASONE) 10 MG tablet 40 mg daily x 3 days  30 mg daily x 3 days  20 mg daily x 3 days  10 mg daily x 3 days 30 tablet 0     azithromycin (ZITHROMAX) 250 MG tablet Two tablets first day, then one tablet daily for four days. 6 tablet 0     albuterol (2.5 MG/3ML) 0.083% neb solution Take 1 vial (2.5 mg) by nebulization every 4 hours as needed for shortness of breath / dyspnea or wheezing 25 vial 1     pregabalin (LYRICA) 150 MG capsule Take 1 capsule (150 mg) by mouth 2 times daily 60 capsule 1     glimepiride (AMARYL) 1 MG tablet Take 3 tablets (3 mg) by mouth 2 times daily (with meals) 90 tablet 3     traZODone (DESYREL) 50 MG tablet 1/2 to 1 tablet at Night 25 tablet 1     traMADol (ULTRAM) 50 MG tablet Take 1-2 tablets ( mg) by mouth every 6 hours as needed for pain maximum 4 tablet(s) per day 120 tablet 1     lidocaine-prilocaine (EMLA) cream Apply topically as needed for moderate pain 5800 g 1     atorvastatin (LIPITOR) 10 MG tablet Take 1 tablet (10 mg) by mouth daily 90 tablet 3     insulin glargine U-300 (TOUJEO SOLOSTAR) 300 UNIT/ML injection Inject 14 Units Subcutaneous At Bedtime 3 Month 1     cyclobenzaprine (FLEXERIL) 10 MG tablet Take 0.5-1  tablets (5-10 mg) by mouth 3 times daily as needed for muscle spasms 30 tablet 1     tamsulosin (FLOMAX) 0.4 MG capsule Take 1 capsule (0.4 mg) by mouth daily 90 capsule 3     ranitidine (ZANTAC) 150 MG tablet Take 1 tablet (150 mg) by mouth 2 times daily 180 tablet 1     albuterol (PROAIR HFA/PROVENTIL HFA/VENTOLIN HFA) 108 (90 BASE) MCG/ACT Inhaler Inhale 2 puffs into the lungs every 4 hours as needed for shortness of breath / dyspnea or wheezing 1 Inhaler 11     order for DME Test strips for pt's glucometer, brand as covered by insurance. Test four times daily and prn. 400 each 4     losartan (COZAAR) 25 MG tablet Take 1 tablet (25 mg) by mouth daily 90 tablet 3     GLOBAL EASE INJECT PEN NEEDLES 31G X 5 MM   3     cholecalciferol (VITAMIN D3) 5000 UNITS TABS tablet Take by mouth daily       thiamine 100 MG tablet Take 100 mg by mouth       [DISCONTINUED] glimepiride (AMARYL) 2 MG tablet Take 1 tablet (2 mg) by mouth 2 times daily With food 180 tablet 1     ACCU-CHEK ULISES PLUS test strip   2     Allergies   Allergen Reactions     Asa [Aspirin] Other (See Comments)     dizzy     Recent Labs   Lab Test  03/08/17   1251  01/04/17   0757  11/29/16   1101  10/15/12   1108  09/15/10   1232   A1C  8.7*  7.8*  8.1*   --   7.3*   LDL   --   73  122*   --   77   HDL   --   50  45   --   51   TRIG   --   197*  226*   --   167*   ALT   --    --   26  18   --    CR   --    --   1.22  0.80   --    GFRESTIMATED   --    --   60*  >90   --    GFRESTBLACK   --    --   73  >90   --    POTASSIUM   --    --   4.6  4.2   --    TSH   --    --   2.58  1.16   --       BP Readings from Last 3 Encounters:   03/22/17 132/74   03/08/17 124/82   02/02/17 132/70    Wt Readings from Last 3 Encounters:   03/22/17 250 lb (113.4 kg)   03/08/17 248 lb (112.5 kg)   02/02/17 253 lb (114.8 kg)                  Labs reviewed in EPIC    Reviewed and updated as needed this visit by clinical staff  Tobacco  Allergies  Meds       Reviewed and updated  "as needed this visit by Provider         ROS:  C: NEGATIVE for fever, chills, change in weight  ENT/MOUTH: as above  R: as above  CV: NEGATIVE for chest pain, palpitations or peripheral edema  GI: NEGATIVE for nausea, abdominal pain, heartburn, or change in bowel habits  MUSCULOSKELETAL: NEGATIVE for significant arthralgias or myalgia    OBJECTIVE:                                                    /74  Pulse 90  Temp 98.2  F (36.8  C)  Resp 16  Ht 5' 9.5\" (1.765 m)  Wt 250 lb (113.4 kg)  SpO2 97%  BMI 36.39 kg/m2  Body mass index is 36.39 kg/(m^2).  GENERAL: healthy, alert and no distress  EYES: Eyes grossly normal to inspection, PERRL and conjunctivae and sclerae normal  HENT: normal cephalic/atraumatic, ear canals and TM's normal, oropharynx clear, oral mucous membranes moist and nasal congestion  NECK: no adenopathy, no asymmetry, masses, or scars and thyroid normal to palpation  RESP: expiratory wheezes Bilaterally  CV: regular rate and rhythm, normal S1 S2, no S3 or S4, no murmur, click or rub, no peripheral edema and peripheral pulses strong  ABDOMEN: soft, nontender, no hepatosplenomegaly, no masses and bowel sounds normal  MS: no gross musculoskeletal defects noted, no edema    Diagnostic Test Results:  none      ASSESSMENT/PLAN:                                                        BMI:   Estimated body mass index is 36.39 kg/(m^2) as calculated from the following:    Height as of this encounter: 5' 9.5\" (1.765 m).    Weight as of this encounter: 250 lb (113.4 kg).   Weight management plan: Low abigail diet      1. Asthma exacerbation  Advised start Prednisone in weaning dose  Use nebs q2-4 hours PRN  Follow up 1-2 weeks-sooner if symptoms worse-go to ER  - predniSONE (DELTASONE) 10 MG tablet; 40 mg daily x 3 days  30 mg daily x 3 days  20 mg daily x 3 days  10 mg daily x 3 days  Dispense: 30 tablet; Refill: 0  - albuterol (2.5 MG/3ML) 0.083% neb solution; Take 1 vial (2.5 mg) by nebulization " every 4 hours as needed for shortness of breath / dyspnea or wheezing  Dispense: 25 vial; Refill: 1    2. Acute sinusitis, recurrence not specified, unspecified location  SEE [facility] care orders  The potential side effects of this medication have been discussed with the patient.  Call if any significant problems with these are experienced.    - azithromycin (ZITHROMAX) 250 MG tablet; Two tablets first day, then one tablet daily for four days.  Dispense: 6 tablet; Refill: 0    3. Type 2 diabetes mellitus with stage 3 chronic kidney disease, with long-term current use of insulin (H)  Refilled  Advised increase Lantus to 12 Units  Advised to send accuchecks to me in 3 weeks  Discussed that Prednisone will increase  Blood sugar  - INVOKANA 100 MG tablet; Take 1 tablet (100 mg) by mouth every morning (before breakfast)  Dispense: 90 tablet; Refill: 1  Follow up Diabetes 2 months  Deborah Leslie MD  [facility]

## 2017-03-22 NOTE — PATIENT INSTRUCTIONS
[facility]    If you have any questions regarding to your visit please contact your care team:       Team Red:   Clinic Hours Telephone Number   Dr. Twyla Lucio  (pediatrics)  Paula Hutson NP 7am-7pm  Monday - Thursday   7am-5pm  Fridays  [phone] (fax)    Luis Alberto KAUR  [phone]   Urgent Care - [place] and [place] Monday-Friday  [place] - 11am-8pm  Saturday-Sunday  Both sites - 9am-5pm  [phone] - [facility]  [phone] - [place]       What options do I have for visits at the clinic other than the traditional office visit?  To expand how we care for you, many of our providers are utilizing electronic visits (e-visits) and telephone visits, when medically appropriate, for interactions with their patients rather than a visit in the clinic.   We also offer nurse visits for many medical concerns. Just like any other service, we will bill your insurance company for this type of visit based on time spent on the phone with your provider. Not all insurance companies cover these visits. Please check with your medical insurance if this type of visit is covered. You will be responsible for any charges that are not paid by your insurance.      E-visits via [employer]:  generally incur a $35.00 fee.  Telephone visits:  Time spent on the phone: *charged based on time that is spent on the phone in increments of 10 minutes. Estimated cost:   5-10 mins $30.00   11-20 mins. $59.00   21-30 mins. $85.00     As always, Thank you for trusting us with your health care needs!    Please increase Lantus to 12-14 units at Night  Start Prednisone as advised  Please go to Emergency Room if worse  I will see you back in 10 days  Next Diabetic check 2 months  Please let us Know How Your sugars are doing in 2-3 weeks  Take care  Deborah Leslie MD                Discharge JANELL Todd CMA

## 2017-03-23 RX ORDER — ATORVASTATIN CALCIUM 10 MG/1
10 TABLET, FILM COATED ORAL DAILY
Qty: 90 TABLET | Refills: 2 | Status: SHIPPED | OUTPATIENT
Start: 2017-03-23 | End: 2017-05-16

## 2017-03-23 RX ORDER — ATORVASTATIN CALCIUM 10 MG/1
TABLET, FILM COATED ORAL
Qty: 30 TABLET | Refills: 1
Start: 2017-03-23

## 2017-03-23 NOTE — TELEPHONE ENCOUNTER
Previous prescription was at [facility].   Request is coming from [facility].    Resent prescription to [facility]    Dereje Saucedo RN

## 2017-04-24 DIAGNOSIS — G47.00 INSOMNIA, UNSPECIFIED TYPE: ICD-10-CM

## 2017-04-25 NOTE — TELEPHONE ENCOUNTER
traZODone (DESYREL) 50 MG tablet       Last Written Prescription Date: 3/8/17  Last Fill Quantity: 25; # refills: 1  Last Office Visit with [facility], [facility] or [facility] prescribing provider:  3/22/17   Next 5 appointments (look out 90 days)     May 04, 2017  8:15 AM CDT   Return Visit with Simon Andrade MD, MULTILINGUAL WORD   [facility] [place] ([place] [facility])    [address] [facility]  [place] [id number] [phone]                   Last PHQ-9 score on record=   PHQ-9 SCORE 12/30/2016   Total Score -   Total Score 0       Lab Results   Component Value Date    AST 10 11/29/2016     Lab Results   Component Value Date    ALT 26 11/29/2016

## 2017-04-26 RX ORDER — TRAZODONE HYDROCHLORIDE 50 MG/1
TABLET, FILM COATED ORAL
Qty: 25 TABLET | Refills: 2 | Status: SHIPPED | OUTPATIENT
Start: 2017-04-26 | End: 2017-05-16

## 2017-04-26 NOTE — TELEPHONE ENCOUNTER
Prescription approved per [facility] Refill Protocol.  Meggan Foreman, RN - [place]

## 2017-05-04 ENCOUNTER — OFFICE VISIT (OUTPATIENT)
Dept: PALLIATIVE MEDICINE | Facility: CLINIC | Age: 62
End: 2017-05-04
Payer: MEDICARE

## 2017-05-04 VITALS
WEIGHT: 248 LBS | BODY MASS INDEX: 36.1 KG/M2 | DIASTOLIC BLOOD PRESSURE: 85 MMHG | SYSTOLIC BLOOD PRESSURE: 170 MMHG | HEART RATE: 85 BPM

## 2017-05-04 DIAGNOSIS — M48.061 LUMBAR STENOSIS: ICD-10-CM

## 2017-05-04 DIAGNOSIS — G89.4 CHRONIC PAIN SYNDROME: ICD-10-CM

## 2017-05-04 DIAGNOSIS — M47.27 OSTEOARTHRITIS OF SPINE WITH RADICULOPATHY, LUMBOSACRAL REGION: Primary | ICD-10-CM

## 2017-05-04 PROCEDURE — 99214 OFFICE O/P EST MOD 30 MIN: CPT | Performed by: ANESTHESIOLOGY

## 2017-05-04 RX ORDER — PREGABALIN 150 MG/1
150 CAPSULE ORAL 2 TIMES DAILY
Qty: 60 CAPSULE | Refills: 1 | Status: SHIPPED | OUTPATIENT
Start: 2017-05-04 | End: 2017-07-18

## 2017-05-04 RX ORDER — TRAMADOL HYDROCHLORIDE 50 MG/1
50-100 TABLET ORAL EVERY 6 HOURS PRN
Qty: 120 TABLET | Refills: 1 | Status: SHIPPED | OUTPATIENT
Start: 2017-05-04 | End: 2017-12-21

## 2017-05-04 ASSESSMENT — PAIN SCALES - GENERAL: PAINLEVEL: SEVERE PAIN (7)

## 2017-05-04 NOTE — NURSING NOTE
"Chief Complaint   Patient presents with     Pain       Initial /85  Pulse 85  Wt 112.5 kg (248 lb)  BMI 36.1 kg/m2 Estimated body mass index is 36.1 kg/(m^2) as calculated from the following:    Height as of 3/22/17: 1.765 m (5' 9.5\").    Weight as of this encounter: 112.5 kg (248 lb).  Medication Reconciliation: complete     Cady Sandoval CMA (AAMA)      "

## 2017-05-04 NOTE — PATIENT INSTRUCTIONS
Assessment:   1. Lumbar stenosis L4-5  2. Lumbar degenerative disc disease   3. Lumbar radiculopathy   4. Lumbosacral spondylosis   5. Facet arthropathy   6. Sacroiliac joint pain, right  7. Left thumb carpometacarpal joint osteoarthritis      Plan:  1. Physical Therapy:  Continue self directed exercise - add regular stretching  2. Clinical Health Psychologist to address issues of relaxation, behavioral change, coping style, and other factors important to improvement.  Deferred - coping well at this time  3. Diagnostic Studies:  Not indicated at this time  4. Medication Management:    1. Continue Lyrica 150 mg BID  2. Continue Tramadol 50 mg 1-2 tabs Q6H - 2 month supply  5. Further procedures recommended:   1. Repeat lumbar epidural steroid injection if pain worsens  6. Recommendations to PCP: continue current treatment  7. Follow up: 2 months      ----------------------------------------------------------------  Nurse Triage line:  [phone]   Call this number with any questions or concerns. You may leave a detailed message anytime. Calls are typically returned Monday through Friday between 8 AM and 4:30 PM. We usually get back to you within 2 business days depending on the issue/request.       Medication refills:    For non-narcotic medications, call your pharmacy directly to request a refill. The pharmacy will contact the [facility] for authorization. Please allow 3-4 days for these refills to be processed.     For narcotic refills, call the nurse triage line or send a [employer] message. Please contact us 7-10 days before your refill is due. The message MUST include the name of the specific medication(s) requested and how you would like to receive the prescription(s). The options are as follows:    Pain Clinic staff can mail the prescription to your pharmacy. Please tell us the name of the pharmacy.    You may pick the prescription up at the Pain Clinic (tell us the location) or during a clinic  visit with your pain provider    Pain Clinic staff can deliver the prescription to the [place] pharmacy in the clinic building. Please tell us the location.      Scheduling number: [phone].  Call this number to schedule or change appointments.    We believe regular attendance is key to your success in our program.    Any time you are unable to keep your appointment we ask that you call us at least 24 hours in advance to let us know. This will allow us to offer the appointment time to another patient.

## 2017-05-04 NOTE — PROGRESS NOTES
[facility]    Date of visit: 5/4/2017    Chief complaint:   Chief Complaint   Patient presents with     Pain       Interval history:  Sandra Dos Santos was last seen by me on 2/2/17 for an office visit and on 1/10/17 for bilateral TFESI L4-5.  He was a no-show for his visit on 3/22/17    Recommendations/plan at the last visit included:  Plan:  1. Physical Therapy: [facility] for physical therapy eval and treat  2. Clinical Health Psychologist to address issues of relaxation, behavioral change, coping style, and other factors important to improvement. deferred  3. Diagnostic Studies: Not indicated at this time  4. Medication Management:   1. Tramadol 50 mg 1-2 tabs Q6H - Rx given today - 2 month supply  2. Lyrica 150 mg BID  5. Further procedures recommended:   1. Recommend repeat LESI interlaminar L5-S1 vs TFESI L4-5 bilaterally  2. Left thumb carpometacarpal joint injection today - see procedure note below  6. Recommendations to PCP: defer pain complaints to pain managment  7. Follow up: 6-8 weeks    Since his last visit, Sandra Dos Santos reports:  Patient states that he did get some relief with the epidural steroid injection.  He did attend two visits with physical therapy but did not get the treatment that he expected so he started going to the gym daily which he feels has helped with his pain.  He states that TENS has helped with his pain as well.  He is walking and riding exercise bicycles at the gym and is following exercises he got from physical therapy.  The medications help with his pain but he is out at this time.    He denies lower back pain but is having pain in the buttocks and in the back of the thighs bilaterally.  His pain comes on with walking but he has no pain with sitting, standing, or lying down.    Pain scores:  Pain intensity on average is 7 on a scale of 0-10.     Current pain treatments:   Tramadol 50 mg 2 tablets twice a day  Lyrica 150 mg BID    Past pain  treatments:  Flexeril - made him too sleepy    Side Effects: denies any problems    Medications:  Current Outpatient Prescriptions   Medication Sig Dispense Refill     traZODone (DESYREL) 50 MG tablet TAKE ONE-HALF TO ONE TABLET EACH NIGHT AT BEDTIME 25 tablet 2     atorvastatin (LIPITOR) 10 MG tablet Take 1 tablet (10 mg) by mouth daily 90 tablet 2     albuterol (ACCUNEB) 1.25 MG/3ML nebulizer solution Take 1 vial by nebulization every 6 hours as needed for shortness of breath / dyspnea or wheezing       INVOKANA 100 MG tablet Take 1 tablet (100 mg) by mouth every morning (before breakfast) 90 tablet 1     predniSONE (DELTASONE) 10 MG tablet 40 mg daily x 3 days  30 mg daily x 3 days  20 mg daily x 3 days  10 mg daily x 3 days 30 tablet 0     azithromycin (ZITHROMAX) 250 MG tablet Two tablets first day, then one tablet daily for four days. 6 tablet 0     albuterol (2.5 MG/3ML) 0.083% neb solution Take 1 vial (2.5 mg) by nebulization every 4 hours as needed for shortness of breath / dyspnea or wheezing 25 vial 1     pregabalin (LYRICA) 150 MG capsule Take 1 capsule (150 mg) by mouth 2 times daily 60 capsule 1     glimepiride (AMARYL) 1 MG tablet Take 3 tablets (3 mg) by mouth 2 times daily (with meals) 90 tablet 3     traMADol (ULTRAM) 50 MG tablet Take 1-2 tablets ( mg) by mouth every 6 hours as needed for pain maximum 4 tablet(s) per day 120 tablet 1     lidocaine-prilocaine (EMLA) cream Apply topically as needed for moderate pain 5800 g 1     insulin glargine U-300 (TOUJEO SOLOSTAR) 300 UNIT/ML injection Inject 14 Units Subcutaneous At Bedtime 3 Month 1     cyclobenzaprine (FLEXERIL) 10 MG tablet Take 0.5-1 tablets (5-10 mg) by mouth 3 times daily as needed for muscle spasms 30 tablet 1     tamsulosin (FLOMAX) 0.4 MG capsule Take 1 capsule (0.4 mg) by mouth daily 90 capsule 3     ranitidine (ZANTAC) 150 MG tablet Take 1 tablet (150 mg) by mouth 2 times daily 180 tablet 1     albuterol (PROAIR HFA/PROVENTIL  HFA/VENTOLIN HFA) 108 (90 BASE) MCG/ACT Inhaler Inhale 2 puffs into the lungs every 4 hours as needed for shortness of breath / dyspnea or wheezing 1 Inhaler 11     order for DME Test strips for pt's glucometer, brand as covered by insurance. Test four times daily and prn. 400 each 4     losartan (COZAAR) 25 MG tablet Take 1 tablet (25 mg) by mouth daily 90 tablet 3     GLOBAL EASE INJECT PEN NEEDLES 31G X 5 MM   3     ACCU-CHEK ULISES PLUS test strip   2     cholecalciferol (VITAMIN D3) 5000 UNITS TABS tablet Take by mouth daily       thiamine 100 MG tablet Take 100 mg by mouth         Medical History: any changes in medical history since they were last seen? Yes - needed to start on insulin for better diabetic control    Review of Systems:  The 14 system ROS was reviewed from the intake questionnaire, and is positive for: back pain, leg pain, diabetes  Any bowel or bladder problems: denies changes  Mood: good    Physical Exam:  /85  Pulse 85  Wt 112.5 kg (248 lb)  BMI 36.1 kg/m2  Constitutional: alert and no distress.  Pt is obese  Head: Normocephalic. No masses, lesions, tenderness or abnormalities  ENT: EOMI, mucosal surfaces moist.  Neck with full ROM, posture fair  Cardiovascular: No edema or JVD appreciated.  Respiratory: Good diaphragmatic excursion. No wheezes appreciated.  Speaking in complete sentences without shortness of breath.  No accessory muscle use.   Musculoskeletal: extremities normal- no gross deformities noted, normal muscle tone and able to move about the exam room without difficulty.    Skin: no suspicious lesions or rashes appreciated on exposed areas  Neurologic: Gait normal and non-antalgic. Moving all extremities spontaneously, no apparent weakness.    Psychiatric: mentation appears normal, affect full and good eye contact.      Cervical Spine:  Good range of motion, exam limited  Lumbar Spine:  Non-tender, flexes well, mildly positive straight leg raise - hamstrings tight  bilaterally, sensation grossly intact to light touch    Assessment:   1. Lumbar stenosis L4-5  2. Lumbar degenerative disc disease   3. Lumbar radiculopathy   4. Lumbosacral spondylosis   5. Facet arthropathy   6. Sacroiliac joint pain, right  7. Left thumb carpometacarpal joint osteoarthritis    Sandra Dos Santos is a 62 year old male who is seen at the pain clinic for chronic lower back pain with lower extremity pain.  His pain has been better since the epidural steroid injection and with the addition of regular exercise.  He is doing well at this time.  Our treatment plan is as outlined below.    Plan:  1. Physical Therapy:  Continue self directed exercise - add regular stretching  2. Clinical Health Psychologist to address issues of relaxation, behavioral change, coping style, and other factors important to improvement.  Deferred - coping well at this time  3. Diagnostic Studies:  Not indicated at this time  4. Medication Management:    1. Continue Lyrica 150 mg BID  2. Continue Tramadol 50 mg 1-2 tabs Q6H - 2 month supply  5. Further procedures recommended:   1. Repeat lumbar epidural steroid injection if pain worsens  6. Recommendations to PCP: continue current treatment  7. Follow up: 2 months    Total time spent was 30 minutes, and more than 50% of face to face time was spent in counseling and/or coordination of care regarding regular exercise, stretching, medication management, and interventional procedures to decrease pain and improve function.

## 2017-05-04 NOTE — MR AVS SNAPSHOT
After Visit Summary   5/4/2017    Sandra Dos Santos    MRN: [id number]           Patient Information     Date Of Birth          1955        Visit Information        Provider Department      5/4/2017 8:15 AM Simon Infante MD; [facility] [place]        Today's Diagnoses     Chronic pain syndrome          Care Instructions    Assessment:   1. Lumbar stenosis L4-5  2. Lumbar degenerative disc disease   3. Lumbar radiculopathy   4. Lumbosacral spondylosis   5. Facet arthropathy   6. Sacroiliac joint pain, right  7. Left thumb carpometacarpal joint osteoarthritis      Plan:  1. Physical Therapy:  Continue self directed exercise - add regular stretching  2. Clinical Health Psychologist to address issues of relaxation, behavioral change, coping style, and other factors important to improvement.  Deferred - coping well at this time  3. Diagnostic Studies:  Not indicated at this time  4. Medication Management:    1. Continue Lyrica 150 mg BID  2. Continue Tramadol 50 mg 1-2 tabs Q6H - 2 month supply  5. Further procedures recommended:   1. Repeat lumbar epidural steroid injection if pain worsens  6. Recommendations to PCP: continue current treatment  7. Follow up: 2 months      ----------------------------------------------------------------  Nurse Triage line:  [phone]   Call this number with any questions or concerns. You may leave a detailed message anytime. Calls are typically returned Monday through Friday between 8 AM and 4:30 PM. We usually get back to you within 2 business days depending on the issue/request.       Medication refills:    For non-narcotic medications, call your pharmacy directly to request a refill. The pharmacy will contact the [facility] for authorization. Please allow 3-4 days for these refills to be processed.     For narcotic refills, call the nurse triage line or send a [employer] message. Please contact us 7-10 days before your refill  "is due. The message MUST include the name of the specific medication(s) requested and how you would like to receive the prescription(s). The options are as follows:    Pain Clinic staff can mail the prescription to your pharmacy. Please tell us the name of the pharmacy.    You may pick the prescription up at the Pain Clinic (tell us the location) or during a clinic visit with your pain provider    Pain Clinic staff can deliver the prescription to the [facility] pharmacy in the clinic building. Please tell us the location.      Scheduling number: [phone].  Call this number to schedule or change appointments.    We believe regular attendance is key to your success in our program.    Any time you are unable to keep your appointment we ask that you call us at least 24 hours in advance to let us know. This will allow us to offer the appointment time to another patient.             Follow-ups after your visit        Who to contact     If you have questions or need follow up information about today's clinic visit or your schedule please contact [facility] directly at [phone].  Normal or non-critical lab and imaging results will be communicated to you by [employer], letter or phone within 4 business days after the clinic has received the results. If you do not hear from us within 7 days, please contact the clinic through [employer] or phone. If you have a critical or abnormal lab result, we will notify you by phone as soon as possible.  Submit refill requests through [employer] or call your pharmacy and they will forward the refill request to us. Please allow 3 business days for your refill to be completed.          Additional Information About Your Visit        POPAPP Information     POPAPP lets you send messages to your doctor, view your test results, renew your prescriptions, schedule appointments and more. To sign up, go to [email or web address] . Click on \"Log in\" on the left side of the screen, which " "will take you to the Welcome page. Then click on \"Sign up Now\" on the right side of the page.     You will be asked to enter the access code listed below, as well as some personal information. Please follow the directions to create your username and password.     Your access code is: [email or web address]  Expires: 2017  2:49 PM     Your access code will  in 90 days. If you need help or a new code, please call your [facility] clinic or [phone].        Care EveryWhere ID     This is your Care EveryWhere ID. This could be used by other organizations to access your [facility] medical records  [id number]        Your Vitals Were     Pulse BMI (Body Mass Index)                85 36.1 kg/m2           Blood Pressure from Last 3 Encounters:   17 170/85   17 132/74   17 124/82    Weight from Last 3 Encounters:   17 112.5 kg (248 lb)   17 113.4 kg (250 lb)   17 112.5 kg (248 lb)              Today, you had the following     No orders found for display         Where to get your medicines      Some of these will need a paper prescription and others can be bought over the counter.  Ask your nurse if you have questions.     Bring a paper prescription for each of these medications     pregabalin 150 MG capsule    traMADol 50 MG tablet          Primary Care Provider    Physician No Ref-Primary       No address on file        Thank you!     Thank you for choosing [facility]  for your care. Our goal is always to provide you with excellent care. Hearing back from our patients is one way we can continue to improve our services. Please take a few minutes to complete the written survey that you may receive in the mail after your visit with us. Thank you!             Your Updated Medication List - Protect others around you: Learn how to safely use, store and throw away your medicines at www.disposemymeds.org.          This list is accurate as of: 17  9:09 AM.  Always use your most " recent med list.                   Brand Name Dispense Instructions for use    ACCU-CHEK ULISES PLUS test strip   Generic drug:  blood glucose monitoring          * albuterol 1.25 MG/3ML nebulizer solution    ACCUNEB     Take 1 vial by nebulization every 6 hours as needed for shortness of breath / dyspnea or wheezing       * albuterol 108 (90 BASE) MCG/ACT Inhaler    PROAIR HFA/PROVENTIL HFA/VENTOLIN HFA    1 Inhaler    Inhale 2 puffs into the lungs every 4 hours as needed for shortness of breath / dyspnea or wheezing       * albuterol (2.5 MG/3ML) 0.083% neb solution     25 vial    Take 1 vial (2.5 mg) by nebulization every 4 hours as needed for shortness of breath / dyspnea or wheezing       atorvastatin 10 MG tablet    LIPITOR    90 tablet    Take 1 tablet (10 mg) by mouth daily       azithromycin 250 MG tablet    ZITHROMAX    6 tablet    Two tablets first day, then one tablet daily for four days.       cholecalciferol 5000 UNITS Tabs tablet    vitamin D3     Take by mouth daily       cyclobenzaprine 10 MG tablet    FLEXERIL    30 tablet    Take 0.5-1 tablets (5-10 mg) by mouth 3 times daily as needed for muscle spasms       glimepiride 1 MG tablet    AMARYL    90 tablet    Take 3 tablets (3 mg) by mouth 2 times daily (with meals)       GLOBAL EASE INJECT PEN NEEDLES 31G X 5 MM   Generic drug:  insulin pen needle          insulin glargine U-300 300 UNIT/ML injection    TOUJEO SOLOSTAR    3 Month    Inject 14 Units Subcutaneous At Bedtime       INVOKANA 100 MG tablet   Generic drug:  canagliflozin     90 tablet    Take 1 tablet (100 mg) by mouth every morning (before breakfast)       lidocaine-prilocaine cream    EMLA    5800 g    Apply topically as needed for moderate pain       losartan 25 MG tablet    COZAAR    90 tablet    Take 1 tablet (25 mg) by mouth daily       order for DME     400 each    Test strips for pt's glucometer, brand as covered by insurance. Test four times daily and prn.       predniSONE 10 MG  tablet    DELTASONE    30 tablet    40 mg daily x 3 days 30 mg daily x 3 days 20 mg daily x 3 days 10 mg daily x 3 days       pregabalin 150 MG capsule    LYRICA    60 capsule    Take 1 capsule (150 mg) by mouth 2 times daily       ranitidine 150 MG tablet    ZANTAC    180 tablet    Take 1 tablet (150 mg) by mouth 2 times daily       tamsulosin 0.4 MG capsule    FLOMAX    90 capsule    Take 1 capsule (0.4 mg) by mouth daily       thiamine 100 MG tablet      Take 100 mg by mouth       traMADol 50 MG tablet    ULTRAM    120 tablet    Take 1-2 tablets ( mg) by mouth every 6 hours as needed for pain maximum 4 tablet(s) per day       traZODone 50 MG tablet    DESYREL    25 tablet    TAKE ONE-HALF TO ONE TABLET EACH NIGHT AT BEDTIME       * Notice:  This list has 3 medication(s) that are the same as other medications prescribed for you. Read the directions carefully, and ask your doctor or other care provider to review them with you.

## 2017-05-08 DIAGNOSIS — Z79.4 TYPE 2 DIABETES MELLITUS WITH STAGE 3 CHRONIC KIDNEY DISEASE, WITH LONG-TERM CURRENT USE OF INSULIN (H): ICD-10-CM

## 2017-05-08 DIAGNOSIS — E11.22 TYPE 2 DIABETES MELLITUS WITH STAGE 3 CHRONIC KIDNEY DISEASE, WITH LONG-TERM CURRENT USE OF INSULIN (H): ICD-10-CM

## 2017-05-08 DIAGNOSIS — N18.30 TYPE 2 DIABETES MELLITUS WITH STAGE 3 CHRONIC KIDNEY DISEASE, WITH LONG-TERM CURRENT USE OF INSULIN (H): ICD-10-CM

## 2017-05-09 NOTE — TELEPHONE ENCOUNTER
glimepiride (AMARYL) 1 MG tablet         Last Written Prescription Date: 03/08/2017  Last Fill Quantity: 90, # refills: 3  Last Office Visit with [facility], [facility] or [facility] prescribing provider:  03/22/2017   Next 5 appointments (look out 90 days)     Jul 14, 2017  2:00 PM CDT   Return Visit with Simon Andrade MD   [facility] [place] ([facility]    [id number]  [place] [address] [phone]                   BP Readings from Last 3 Encounters:   05/04/17 170/85   03/22/17 132/74   03/08/17 124/82     Lab Results   Component Value Date    MICROL 20 11/29/2016     Lab Results   Component Value Date    UMALCR 12.00 11/29/2016     Creatinine   Date Value Ref Range Status   11/29/2016 1.22 0.66 - 1.25 mg/dL Final   ]  GFR Estimate   Date Value Ref Range Status   11/29/2016 60 (L) >60 mL/min/1.7m2 Final     Comment:     Non  GFR Calc   10/15/2012 >90 >60 mL/min/1.7m2 Final     GFR Estimate If Black   Date Value Ref Range Status   11/29/2016 73 >60 mL/min/1.7m2 Final     Comment:      GFR Calc   10/15/2012 >90 >60 mL/min/1.7m2 Final     Lab Results   Component Value Date    CHOL 162 01/04/2017     Lab Results   Component Value Date    HDL 50 01/04/2017     Lab Results   Component Value Date    LDL 73 01/04/2017     Lab Results   Component Value Date    TRIG 197 01/04/2017     Lab Results   Component Value Date    CHOLHDLRATIO 3.0 09/15/2010     Lab Results   Component Value Date    AST 10 11/29/2016     Lab Results   Component Value Date    ALT 26 11/29/2016     Lab Results   Component Value Date    A1C 8.7 03/08/2017    A1C 7.8 01/04/2017    A1C 8.1 11/29/2016    A1C 7.3 09/15/2010     Potassium   Date Value Ref Range Status   11/29/2016 4.6 3.4 - 5.3 mmol/L Final     An JANELL George

## 2017-05-10 RX ORDER — GLIMEPIRIDE 1 MG/1
TABLET ORAL
Qty: 180 TABLET | Refills: 0 | Status: SHIPPED | OUTPATIENT
Start: 2017-05-10 | End: 2017-05-16

## 2017-05-10 NOTE — TELEPHONE ENCOUNTER
Prescription approved per [facility] [facility].  Patient due for office visit for further refills.  Meggan Foreman RN - BC

## 2017-05-13 ENCOUNTER — TRANSFERRED RECORDS (OUTPATIENT)
Dept: HEALTH INFORMATION MANAGEMENT | Facility: CLINIC | Age: 62
End: 2017-05-13

## 2017-05-13 LAB
CREAT SERPL-MCNC: 1.21 MG/DL (ref 0.72–1.25)
GFR SERPL CREATININE-BSD FRML MDRD: >60 ML/MIN/1.73M2
GLUCOSE SERPL-MCNC: 117 MG/DL (ref 65–100)
POTASSIUM SERPL-SCNC: 4.1 MMOL/L (ref 3.5–5)

## 2017-05-15 NOTE — PATIENT INSTRUCTIONS
[facility]    If you have any questions regarding to your visit please contact your care team:       Team Red:   Clinic Hours Telephone Number   Dr. Twyla Lucio  (pediatrics)  Paula Hutson NP 7am-7pm  Monday - Thursday   7am-5pm  Fridays  [phone] (fax)    Luis Alberto KAUR  [phone]   Urgent Care - [place] and [place] Monday-Friday  [place] - 11am-8pm  Saturday-Sunday  Both sites - 9am-5pm  [phone] - [facility]  [phone] - [place]       What options do I have for visits at the clinic other than the traditional office visit?  To expand how we care for you, many of our providers are utilizing electronic visits (e-visits) and telephone visits, when medically appropriate, for interactions with their patients rather than a visit in the clinic.   We also offer nurse visits for many medical concerns. Just like any other service, we will bill your insurance company for this type of visit based on time spent on the phone with your provider. Not all insurance companies cover these visits. Please check with your medical insurance if this type of visit is covered. You will be responsible for any charges that are not paid by your insurance.      E-visits via [facility]:  generally incur a $35.00 fee.  Telephone visits:  Time spent on the phone: *charged based on time that is spent on the phone in increments of 10 minutes. Estimated cost:   5-10 mins $30.00   11-20 mins. $59.00   21-30 mins. $85.00     As always, Thank you for trusting us with your health care needs!            Discharge JANELL Todd CMA

## 2017-05-15 NOTE — PROGRESS NOTES
SUBJECTIVE:                                                    Sandra Dos Santos is a 62 year old male who presents to clinic today for the following health issues:      Diabetes Follow-up      Patient is checking blood sugars: usually 110-210 but last 2 nights have been 240    Diabetic concerns: blood sugar over 200     Symptoms of hypoglycemia (low blood sugar): no     Paresthesias (numbness or burning in feet) or sores: Yes      Date of last diabetic eye exam: greater then 1 year       Amount of exercise or physical activity: 4-5 days/week for an average of 30-45 minutes    Problems taking medications regularly: No    Medication side effects: none    Diet: diabetic    ED/UC Followup:    Facility:  [facility]  Date of visit: 05/13/2017  Reason for visit: Dizziness  Current Status: feeling better       Hyperlipidemia Follow-Up      Rate your low fat/cholesterol diet?: good    Taking statin?  Yes, no muscle aches from statin    Other lipid medications/supplements?:  none     Hypertension Follow-up      Outpatient blood pressures are not being checked.    Low Salt Diet: no added salt     Asthma Follow-Up    Was ACT completed today?    Yes    ACT Total Scores 5/16/2017   ACT TOTAL SCORE (Goal Greater than or Equal to 20) 20   In the past 12 months, how many times did you visit the emergency room for your asthma without being admitted to the hospital? 0   In the past 12 months, how many times were you hospitalized overnight because of your asthma? 0       Recent asthma triggers that patient is dealing with: None      Chronic Kidney Disease Follow-up    Current NSAID use?  No  Pt was noted To have a Internal carotid aneurysm 3 mm on MRI done at [facility]    Problem list and histories reviewed & adjusted, as indicated.  Additional history: as documented    Patient Active Problem List   Diagnosis     HYPERLIPIDEMIA LDL GOAL <100     Chronic kidney disease, stage III (moderate)     Gastroesophageal reflux disease      Hypothyroidism     Urinary hesitancy     Type 2 diabetes mellitus with stage 3 chronic kidney disease, with long-term current use of insulin (H)     Chronic pain syndrome     Morbid obesity due to excess calories (H)     Intermittent asthma, uncomplicated     Benign prostatic hyperplasia, presence of lower urinary tract symptoms unspecified, unspecified morphology     Gastroesophageal reflux disease without esophagitis     Hypertension goal BP (blood pressure) < 140/90     Lumbar stenosis     DDD (degenerative disc disease), lumbar     Osteoarthritis of lumbar spine, unspecified spinal osteoarthritis complication status     Facet arthropathy (H)     Osteoarthritis of spine with radiculopathy, lumbosacral region     Other spondylosis, lumbosacral region     Aneurysm of internal carotid artery     Past Surgical History:   Procedure Laterality Date     ARTHROSCOPY SHOULDER RT/LT Right      JOINT REPLACEMTN, KNEE RT/LT  2013  ,2012    Joint Replacement knee RT/LT       Social History   Substance Use Topics     Smoking status: Never Smoker     Smokeless tobacco: Never Used     Alcohol use No     Family History   Problem Relation Age of Onset     DIABETES Father      Prostate Cancer No family hx of      Colon Cancer No family hx of      Breast Cancer No family hx of          Current Outpatient Prescriptions   Medication Sig Dispense Refill     meclizine (ANTIVERT) 12.5 MG tablet Take 12.5 mg by mouth 3 times daily as needed       glimepiride (AMARYL) 1 MG tablet TAKE 3 TABLETS (3 MG) BY MOUTH 2 TIMES DAILY (WITH MEALS) 180 tablet 1     traZODone (DESYREL) 50 MG tablet TAKE ONE-HALF TO ONE TABLET EACH NIGHT AT BEDTIME 25 tablet 2     atorvastatin (LIPITOR) 10 MG tablet Take 1 tablet (10 mg) by mouth daily 90 tablet 2     INVOKANA 100 MG tablet Take 1 tablet (100 mg) by mouth every morning (before breakfast) 90 tablet 0     albuterol (2.5 MG/3ML) 0.083% neb solution Take 1 vial (2.5 mg) by nebulization every 4 hours as  needed for shortness of breath / dyspnea or wheezing 25 vial 1     insulin glargine U-300 (TOUJEO SOLOSTAR) 300 UNIT/ML injection Inject 14 Units Subcutaneous At Bedtime 6 mL 3     tamsulosin (FLOMAX) 0.4 MG capsule Take 1 capsule (0.4 mg) by mouth daily 90 capsule 3     ranitidine (ZANTAC) 150 MG tablet Take 1 tablet (150 mg) by mouth 2 times daily 180 tablet 3     order for DME Test strips for pt's glucometer, brand as covered by insurance. Test four times daily and prn. 400 each 4     order for DME Lancets.  Four times daily and prn. 400 each 4     albuterol (PROAIR HFA/PROVENTIL HFA/VENTOLIN HFA) 108 (90 BASE) MCG/ACT Inhaler Inhale 2 puffs into the lungs every 4 hours as needed for shortness of breath / dyspnea or wheezing 1 Inhaler 11     pregabalin (LYRICA) 150 MG capsule Take 1 capsule (150 mg) by mouth 2 times daily 60 capsule 1     traMADol (ULTRAM) 50 MG tablet Take 1-2 tablets ( mg) by mouth every 6 hours as needed for pain maximum 4 tablet(s) per day 120 tablet 1     lidocaine-prilocaine (EMLA) cream Apply topically as needed for moderate pain 5800 g 1     cyclobenzaprine (FLEXERIL) 10 MG tablet Take 0.5-1 tablets (5-10 mg) by mouth 3 times daily as needed for muscle spasms 30 tablet 1     order for DME Test strips for pt's glucometer, brand as covered by insurance. Test four times daily and prn. 400 each 4     GLOBAL EASE INJECT PEN NEEDLES 31G X 5 MM   3     ACCU-CHEK ULISES PLUS test strip   2     cholecalciferol (VITAMIN D3) 5000 UNITS TABS tablet Take by mouth daily       thiamine 100 MG tablet Take 100 mg by mouth       [DISCONTINUED] glimepiride (AMARYL) 1 MG tablet TAKE 3 TABLETS (3 MG) BY MOUTH 2 TIMES DAILY (WITH MEALS) 180 tablet 0     [DISCONTINUED] traZODone (DESYREL) 50 MG tablet TAKE ONE-HALF TO ONE TABLET EACH NIGHT AT BEDTIME 25 tablet 2     [DISCONTINUED] atorvastatin (LIPITOR) 10 MG tablet Take 1 tablet (10 mg) by mouth daily 90 tablet 2     albuterol (ACCUNEB) 1.25 MG/3ML  nebulizer solution Take 1 vial by nebulization every 6 hours as needed for shortness of breath / dyspnea or wheezing Reported on 5/16/2017       azithromycin (ZITHROMAX) 250 MG tablet Two tablets first day, then one tablet daily for four days. (Patient not taking: Reported on 5/16/2017) 6 tablet 0     [DISCONTINUED] albuterol (2.5 MG/3ML) 0.083% neb solution Take 1 vial (2.5 mg) by nebulization every 4 hours as needed for shortness of breath / dyspnea or wheezing 25 vial 1     [DISCONTINUED] insulin glargine U-300 (TOUJEO SOLOSTAR) 300 UNIT/ML injection Inject 14 Units Subcutaneous At Bedtime 3 Month 1     losartan (COZAAR) 25 MG tablet Take 1 tablet (25 mg) by mouth daily (Patient not taking: Reported on 5/16/2017) 90 tablet 3     [DISCONTINUED] albuterol (PROAIR HFA/PROVENTIL HFA/VENTOLIN HFA) 108 (90 BASE) MCG/ACT Inhaler Inhale 2 puffs into the lungs every 4 hours as needed for shortness of breath / dyspnea or wheezing 1 Inhaler 11     Allergies   Allergen Reactions     Asa [Aspirin] Other (See Comments)     dizzy     Morphine Itching     Itching     Recent Labs   Lab Test  05/16/17   1143  03/08/17   1251  01/04/17   0757  11/29/16   1101  10/15/12   1108  09/15/10   1232   A1C  9.0*  8.7*  7.8*  8.1*   --   7.3*   LDL   --    --   73  122*   --   77   HDL   --    --   50  45   --   51   TRIG   --    --   197*  226*   --   167*   ALT   --    --    --   26  18   --    CR   --    --    --   1.22  0.80   --    GFRESTIMATED   --    --    --   60*  >90   --    GFRESTBLACK   --    --    --   73  >90   --    POTASSIUM   --    --    --   4.6  4.2   --    TSH   --    --    --   2.58  1.16   --       BP Readings from Last 3 Encounters:   05/16/17 134/86   05/04/17 170/85   03/22/17 132/74    Wt Readings from Last 3 Encounters:   05/16/17 252 lb (114.3 kg)   05/04/17 248 lb (112.5 kg)   03/22/17 250 lb (113.4 kg)                  Labs reviewed in EPIC    Reviewed and updated as needed this visit by clinical staff      "  Reviewed and updated as needed this visit by Provider         ROS:  C: NEGATIVE for fever, chills, change in weight  E/M: NEGATIVE for ear, mouth and throat problems  R: NEGATIVE for significant cough or SOB  CV: NEGATIVE for chest pain, palpitations or peripheral edema  GI: NEGATIVE for nausea, abdominal pain, heartburn, or change in bowel habits  NEURO: NEGATIVE for weakness, dizziness or paresthesias  PSYCHIATRIC: NEGATIVE for changes in mood or affect    OBJECTIVE:                                                    /86 (BP Location: Right arm, Patient Position: Chair, Cuff Size: Adult Large)  Pulse 80  Temp 98.7  F (37.1  C) (Oral)  Ht 5' 9.5\" (1.765 m)  Wt 252 lb (114.3 kg)  SpO2 95%  BMI 36.68 kg/m2  Body mass index is 36.68 kg/(m^2).  GENERAL: healthy, alert and no distress  GENERAL: obese  NECK: no adenopathy, no asymmetry, masses, or scars and thyroid normal to palpation  RESP: lungs clear to auscultation - no rales, rhonchi or wheezes  CV: regular rate and rhythm, normal S1 S2, no S3 or S4, no murmur, click or rub, no peripheral edema and peripheral pulses strong  ABDOMEN: soft, nontender, no hepatosplenomegaly, no masses and bowel sounds normal  MS: no gross musculoskeletal defects noted, no edema  NEURO: Normal strength and tone, mentation intact and speech normal  PSYCH: mentation appears normal, affect normal/bright    Diagnostic Test Results:  Results for orders placed or performed in visit on 05/16/17 (from the past 24 hour(s))   Hemoglobin A1c   Result Value Ref Range    Hemoglobin A1C 9.0 (H) 4.3 - 6.0 %        ASSESSMENT/PLAN:                                                        BMI:   Estimated body mass index is 36.68 kg/(m^2) as calculated from the following:    Height as of this encounter: 5' 9.5\" (1.765 m).    Weight as of this encounter: 252 lb (114.3 kg).   Weight management plalow abigail dietlow abigail diet      1. Type 2 diabetes mellitus with stage 3 chronic kidney disease, " with long-term current use of insulin (H)  Uncontrolled  Pt says he Took 15 un Lantus last Night  And accucheck today was 95  Advised He make appointment with MTM  Bring his Glucometer   So we can adjust His Insulin    - Hemoglobin A1c  - glimepiride (AMARYL) 1 MG tablet; TAKE 3 TABLETS (3 MG) BY MOUTH 2 TIMES DAILY (WITH MEALS)  Dispense: 180 tablet; Refill: 1  - INVOKANA 100 MG tablet; Take 1 tablet (100 mg) by mouth every morning (before breakfast)  Dispense: 90 tablet; Refill: 0  - insulin glargine U-300 (TOUJEO SOLOSTAR) 300 UNIT/ML injection; Inject 14 Units Subcutaneous At Bedtime  Dispense: 6 mL; Refill: 3  - order for DME; Test strips for pt's glucometer, brand as covered by insurance. Test four times daily and prn.  Dispense: 400 each; Refill: 4  - order for DME; Lancets.  Four times daily and prn.  Dispense: 400 each; Refill: 4  Follow up with me in 2 months  2. Dizziness  better    3. Aneurysm of internal carotid artery  Referral done  - NEUROLOGY ADULT REFERRAL    4. Insomnia, unspecified type  refilled  - traZODone (DESYREL) 50 MG tablet; TAKE ONE-HALF TO ONE TABLET EACH NIGHT AT BEDTIME  Dispense: 25 tablet; Refill: 2    5. Hyperlipidemia LDL goal <100  refilled  - atorvastatin (LIPITOR) 10 MG tablet; Take 1 tablet (10 mg) by mouth daily  Dispense: 90 tablet; Refill: 2    6. Morbid obesity due to excess calories (H)  As above    7. Benign prostatic hyperplasia, presence of lower urinary tract symptoms unspecified, unspecified morphology  refilled  - tamsulosin (FLOMAX) 0.4 MG capsule; Take 1 capsule (0.4 mg) by mouth daily  Dispense: 90 capsule; Refill: 3    8. Gastroesophageal reflux disease without esophagitis  controlled  - ranitidine (ZANTAC) 150 MG tablet; Take 1 tablet (150 mg) by mouth 2 times daily  Dispense: 180 tablet; Refill: 3    9. Intermittent asthma, uncomplicated  Stable   - albuterol (PROAIR HFA/PROVENTIL HFA/VENTOLIN HFA) 108 (90 BASE) MCG/ACT Inhaler; Inhale 2 puffs into the lungs  every 4 hours as needed for shortness of breath / dyspnea or wheezing  Dispense: 1 Inhaler; Refill: 11    Follow up 2 months  Strict Diabetic Diet    Deborah Leslie MD  [facility]

## 2017-05-16 ENCOUNTER — OFFICE VISIT (OUTPATIENT)
Dept: FAMILY MEDICINE | Facility: CLINIC | Age: 62
End: 2017-05-16
Payer: MEDICARE

## 2017-05-16 VITALS
BODY MASS INDEX: 36.08 KG/M2 | DIASTOLIC BLOOD PRESSURE: 86 MMHG | SYSTOLIC BLOOD PRESSURE: 134 MMHG | HEIGHT: 70 IN | OXYGEN SATURATION: 95 % | WEIGHT: 252 LBS | TEMPERATURE: 98.7 F | HEART RATE: 80 BPM

## 2017-05-16 DIAGNOSIS — N18.30 TYPE 2 DIABETES MELLITUS WITH STAGE 3 CHRONIC KIDNEY DISEASE, WITH LONG-TERM CURRENT USE OF INSULIN (H): Primary | ICD-10-CM

## 2017-05-16 DIAGNOSIS — N40.0 BENIGN PROSTATIC HYPERPLASIA, PRESENCE OF LOWER URINARY TRACT SYMPTOMS UNSPECIFIED, UNSPECIFIED MORPHOLOGY: ICD-10-CM

## 2017-05-16 DIAGNOSIS — Z79.4 TYPE 2 DIABETES MELLITUS WITH STAGE 3 CHRONIC KIDNEY DISEASE, WITH LONG-TERM CURRENT USE OF INSULIN (H): Primary | ICD-10-CM

## 2017-05-16 DIAGNOSIS — M25.569 CHRONIC KNEE PAIN, UNSPECIFIED LATERALITY: ICD-10-CM

## 2017-05-16 DIAGNOSIS — R42 DIZZINESS: ICD-10-CM

## 2017-05-16 DIAGNOSIS — E11.22 TYPE 2 DIABETES MELLITUS WITH STAGE 3 CHRONIC KIDNEY DISEASE, WITH LONG-TERM CURRENT USE OF INSULIN (H): Primary | ICD-10-CM

## 2017-05-16 DIAGNOSIS — K21.9 GASTROESOPHAGEAL REFLUX DISEASE WITHOUT ESOPHAGITIS: ICD-10-CM

## 2017-05-16 DIAGNOSIS — E78.5 HYPERLIPIDEMIA LDL GOAL <100: ICD-10-CM

## 2017-05-16 DIAGNOSIS — I67.1 ANEURYSM OF INTERNAL CAROTID ARTERY: ICD-10-CM

## 2017-05-16 DIAGNOSIS — G47.00 INSOMNIA, UNSPECIFIED TYPE: ICD-10-CM

## 2017-05-16 DIAGNOSIS — G89.29 CHRONIC KNEE PAIN, UNSPECIFIED LATERALITY: ICD-10-CM

## 2017-05-16 DIAGNOSIS — E66.01 MORBID OBESITY DUE TO EXCESS CALORIES (H): ICD-10-CM

## 2017-05-16 DIAGNOSIS — J45.20 INTERMITTENT ASTHMA, UNCOMPLICATED: ICD-10-CM

## 2017-05-16 LAB — HBA1C MFR BLD: 9 % (ref 4.3–6)

## 2017-05-16 PROCEDURE — 83036 HEMOGLOBIN GLYCOSYLATED A1C: CPT | Performed by: FAMILY MEDICINE

## 2017-05-16 PROCEDURE — 36415 COLL VENOUS BLD VENIPUNCTURE: CPT | Performed by: FAMILY MEDICINE

## 2017-05-16 PROCEDURE — 99214 OFFICE O/P EST MOD 30 MIN: CPT | Performed by: FAMILY MEDICINE

## 2017-05-16 RX ORDER — CANAGLIFLOZIN 100 MG/1
100 TABLET, FILM COATED ORAL
Qty: 90 TABLET | Refills: 0 | Status: SHIPPED | OUTPATIENT
Start: 2017-05-16 | End: 2017-08-29

## 2017-05-16 RX ORDER — ALBUTEROL SULFATE 0.83 MG/ML
1 SOLUTION RESPIRATORY (INHALATION) EVERY 4 HOURS PRN
Qty: 25 VIAL | Refills: 1 | Status: SHIPPED | OUTPATIENT
Start: 2017-05-16 | End: 2017-09-12

## 2017-05-16 RX ORDER — ATORVASTATIN CALCIUM 10 MG/1
10 TABLET, FILM COATED ORAL DAILY
Qty: 90 TABLET | Refills: 2 | Status: SHIPPED | OUTPATIENT
Start: 2017-05-16 | End: 2017-11-01

## 2017-05-16 RX ORDER — TAMSULOSIN HYDROCHLORIDE 0.4 MG/1
0.4 CAPSULE ORAL DAILY
Qty: 90 CAPSULE | Refills: 3 | Status: SHIPPED | OUTPATIENT
Start: 2017-05-16 | End: 2018-02-21

## 2017-05-16 RX ORDER — GLIMEPIRIDE 1 MG/1
TABLET ORAL
Qty: 180 TABLET | Refills: 1 | Status: SHIPPED | OUTPATIENT
Start: 2017-05-16 | End: 2017-08-17

## 2017-05-16 RX ORDER — MECLIZINE HCL 12.5 MG 12.5 MG/1
12.5 TABLET ORAL 3 TIMES DAILY PRN
COMMUNITY
Start: 2017-05-13 | End: 2017-05-31

## 2017-05-16 RX ORDER — ALBUTEROL SULFATE 90 UG/1
2 AEROSOL, METERED RESPIRATORY (INHALATION) EVERY 4 HOURS PRN
Qty: 1 INHALER | Refills: 11 | Status: SHIPPED | OUTPATIENT
Start: 2017-05-16 | End: 2017-11-01

## 2017-05-16 RX ORDER — TRAZODONE HYDROCHLORIDE 50 MG/1
TABLET, FILM COATED ORAL
Qty: 25 TABLET | Refills: 2 | Status: SHIPPED | OUTPATIENT
Start: 2017-05-16 | End: 2017-10-10

## 2017-05-16 NOTE — NURSING NOTE
"Chief Complaint   Patient presents with     Diabetes     Er F/u     Medication Reconciliation       Initial /86 (BP Location: Right arm, Patient Position: Chair, Cuff Size: Adult Large)  Pulse 80  Temp 98.7  F (37.1  C) (Oral)  Ht 5' 9.5\" (1.765 m)  Wt 252 lb (114.3 kg)  SpO2 95%  BMI 36.68 kg/m2 Estimated body mass index is 36.68 kg/(m^2) as calculated from the following:    Height as of this encounter: 5' 9.5\" (1.765 m).    Weight as of this encounter: 252 lb (114.3 kg).  Medication Reconciliation: complete    "

## 2017-05-16 NOTE — MR AVS SNAPSHOT
After Visit Summary   5/16/2017    Sandra Dos Santos    MRN: [id number]           Patient Information     Date Of Birth          1955        Visit Information        Provider Department      5/16/2017 11:00 AM Deborah Leslie MD; MULTILINGUAL WORD [facility]        Today's Diagnoses     Type 2 diabetes mellitus with stage 3 chronic kidney disease, with long-term current use of insulin (H)    -  1    Dizziness        Aneurysm of internal carotid artery        Insomnia, unspecified type        Hyperlipidemia LDL goal <100        Benign prostatic hyperplasia, presence of lower urinary tract symptoms unspecified, unspecified morphology        Gastroesophageal reflux disease without esophagitis        Intermittent asthma, uncomplicated        Morbid obesity due to excess calories (H)          Care Instructions    [facility]    If you have any questions regarding to your visit please contact your care team:       Team Red:   Clinic Hours Telephone Number   Dr. Twyla Lucio  (pediatrics)  Paula Hutson NP 7am-7pm  Monday - Thursday   7am-5pm  Fridays  [phone] (fax)    Luis Alberto KAUR  [phone]   Urgent Care - [place] and [place] Monday-Friday  [place] - 11am-8pm  Saturday-Sunday  Both sites - 9am-5pm  [phone] - [facility]  [phone] - [place]       What options do I have for visits at the clinic other than the traditional office visit?  To expand how we care for you, many of our providers are utilizing electronic visits (e-visits) and telephone visits, when medically appropriate, for interactions with their patients rather than a visit in the clinic.   We also offer nurse visits for many medical concerns. Just like any other service, we will bill your insurance company for this type of visit based on time spent on the phone with your provider. Not all insurance companies cover these visits. Please check  with your medical insurance if this type of visit is covered. You will be responsible for any charges that are not paid by your insurance.      E-visits via CommonKeyhart:  generally incur a $35.00 fee.  Telephone visits:  Time spent on the phone: *charged based on time that is spent on the phone in increments of 10 minutes. Estimated cost:   5-10 mins $30.00   11-20 mins. $59.00   21-30 mins. $85.00     As always, Thank you for trusting us with your health care needs!            Discharge JANELL Todd  [facility]          Follow-ups after your visit        Additional Services     NEUROLOGY ADULT REFERRAL       Your provider has referred you to: FM[facility]: [facility] [place] [phone]   [email or web address]    Please be aware that coverage of these services is subject to the terms and limitations of your health insurance plan.  Call member services at your health plan with any benefit or coverage questions.      Please bring the following to your appointment:    >>   Any x-rays, CTs or MRIs which have been performed.  Contact the facility where they were done to arrange for  prior to your scheduled appointment.  Any new CT, MRI or other procedures ordered by your specialist must be performed at a [place] facility or coordinated by your clinic's referral office.    >>   List of current medications   >>   This referral request   >>   Any documents/labs given to you for this referral                  Your next 10 appointments already scheduled     Jul 14, 2017  2:00 PM CDT   Return Visit with Simon Andrade MD   [facility] [place] ([facility] [facility] [place])    [address]  [place] [address] [phone]              Who to contact     If you have questions or need follow up information about today's clinic visit or your schedule please contact [facility] directly at [phone].  Normal or non-critical lab and imaging results will  "be communicated to you by MyChart, letter or phone within 4 business days after the clinic has received the results. If you do not hear from us within 7 days, please contact the clinic through Cyprotex or phone. If you have a critical or abnormal lab result, we will notify you by phone as soon as possible.  Submit refill requests through Cyprotex or call your pharmacy and they will forward the refill request to us. Please allow 3 business days for your refill to be completed.          Additional Information About Your Visit        Cyprotex Information     Cyprotex lets you send messages to your doctor, view your test results, renew your prescriptions, schedule appointments and more. To sign up, go to [email or web address] . Click on \"Log in\" on the left side of the screen, which will take you to the Welcome page. Then click on \"Sign up Now\" on the right side of the page.     You will be asked to enter the access code listed below, as well as some personal information. Please follow the directions to create your username and password.     Your access code is: [email or web address]  Expires: 2017  2:49 PM     Your access code will  in 90 days. If you need help or a new code, please call your [place] clinic or [phone].        Care EveryWhere ID     This is your Care EveryWhere ID. This could be used by other organizations to access your [place] medical records  [id number]        Your Vitals Were     Pulse Temperature Height Pulse Oximetry BMI (Body Mass Index)       80 98.7  F (37.1  C) (Oral) 5' 9.5\" (1.765 m) 95% 36.68 kg/m2        Blood Pressure from Last 3 Encounters:   17 134/86   17 170/85   17 132/74    Weight from Last 3 Encounters:   17 252 lb (114.3 kg)   17 248 lb (112.5 kg)   17 250 lb (113.4 kg)              We Performed the Following     Hemoglobin A1c     NEUROLOGY ADULT REFERRAL          Today's Medication Changes          These changes are accurate as of: " 5/16/17 12:32 PM.  If you have any questions, ask your nurse or doctor.               These medicines have changed or have updated prescriptions.        Dose/Directions    glimepiride 1 MG tablet   Commonly known as:  AMARYL   This may have changed:  See the new instructions.   Used for:  Type 2 diabetes mellitus with stage 3 chronic kidney disease, with long-term current use of insulin (H)   Changed by:  Deborah Leslie MD        TAKE 3 TABLETS (3 MG) BY MOUTH 2 TIMES DAILY (WITH MEALS)   Quantity:  180 tablet   Refills:  1       * order for DME   This may have changed:  Another medication with the same name was added. Make sure you understand how and when to take each.   Used for:  Type 2 diabetes mellitus with stage 3 chronic kidney disease, with long-term current use of insulin (H)   Changed by:  Deborah Leslie MD        Test strips for pt's glucometer, brand as covered by insurance. Test four times daily and prn.   Quantity:  400 each   Refills:  4       * order for DME   This may have changed:  You were already taking a medication with the same name, and this prescription was added. Make sure you understand how and when to take each.   Used for:  Type 2 diabetes mellitus with stage 3 chronic kidney disease, with long-term current use of insulin (H)   Changed by:  Deborah Leslie MD        Test strips for pt's glucometer, brand as covered by insurance. Test four times daily and prn.   Quantity:  400 each   Refills:  4       * order for DME   This may have changed:  You were already taking a medication with the same name, and this prescription was added. Make sure you understand how and when to take each.   Used for:  Type 2 diabetes mellitus with stage 3 chronic kidney disease, with long-term current use of insulin (H)   Changed by:  Deborah Leslie MD        Lancets.  Four times daily and prn.   Quantity:  400 each   Refills:  4       traZODone 50 MG tablet   Commonly known as:  DESYREL   This may have changed:  See  the new instructions.   Used for:  Insomnia, unspecified type   Changed by:  Deborah Leslie MD        TAKE ONE-HALF TO ONE TABLET EACH NIGHT AT BEDTIME   Quantity:  25 tablet   Refills:  2       * Notice:  This list has 3 medication(s) that are the same as other medications prescribed for you. Read the directions carefully, and ask your doctor or other care provider to review them with you.      Stop taking these medicines if you haven't already. Please contact your care team if you have questions.     predniSONE 10 MG tablet   Commonly known as:  DELTASONE   Stopped by:  Deborah Leslie MD                Where to get your medicines      These medications [facility] [place] - [address]  [address], [place] [address]     Phone:  [phone]     albuterol (2.5 MG/3ML) 0.083% neb solution    albuterol 108 (90 BASE) MCG/ACT Inhaler    atorvastatin 10 MG tablet    glimepiride 1 MG tablet    insulin glargine U-300 300 UNIT/ML injection    INVOKANA 100 MG tablet    ranitidine 150 MG tablet    tamsulosin 0.4 MG capsule    traZODone 50 MG tablet         Some of these will need a paper prescription and others can be bought over the counter.  Ask your nurse if you have questions.     Bring a paper prescription for each of these medications     order for DME    order for DME                Primary Care Provider    Physician No Ref-Primary       No address on file        Thank you!     Thank you for choosing [facility]  for your care. Our goal is always to provide you with excellent care. Hearing back from our patients is one way we can continue to improve our services. Please take a few minutes to complete the written survey that you may receive in the mail after your visit with us. Thank you!             Your Updated Medication List - Protect others around you: Learn how to safely use, store and throw away your medicines at www.disposemymeds.org.           This list is accurate as of: 5/16/17 12:32 PM.  Always use your most recent med list.                   Brand Name Dispense Instructions for use    ACCU-CHEK ULISES PLUS test strip   Generic drug:  blood glucose monitoring          * albuterol 1.25 MG/3ML nebulizer solution    ACCUNEB     Take 1 vial by nebulization every 6 hours as needed for shortness of breath / dyspnea or wheezing Reported on 5/16/2017       * albuterol (2.5 MG/3ML) 0.083% neb solution     25 vial    Take 1 vial (2.5 mg) by nebulization every 4 hours as needed for shortness of breath / dyspnea or wheezing       * albuterol 108 (90 BASE) MCG/ACT Inhaler    PROAIR HFA/PROVENTIL HFA/VENTOLIN HFA    1 Inhaler    Inhale 2 puffs into the lungs every 4 hours as needed for shortness of breath / dyspnea or wheezing       atorvastatin 10 MG tablet    LIPITOR    90 tablet    Take 1 tablet (10 mg) by mouth daily       azithromycin 250 MG tablet    ZITHROMAX    6 tablet    Two tablets first day, then one tablet daily for four days.       cholecalciferol 5000 UNITS Tabs tablet    vitamin D3     Take by mouth daily       cyclobenzaprine 10 MG tablet    FLEXERIL    30 tablet    Take 0.5-1 tablets (5-10 mg) by mouth 3 times daily as needed for muscle spasms       glimepiride 1 MG tablet    AMARYL    180 tablet    TAKE 3 TABLETS (3 MG) BY MOUTH 2 TIMES DAILY (WITH MEALS)       GLOBAL EASE INJECT PEN NEEDLES 31G X 5 MM   Generic drug:  insulin pen needle          insulin glargine U-300 300 UNIT/ML injection    TOUJEO SOLOSTAR    6 mL    Inject 14 Units Subcutaneous At Bedtime       INVOKANA 100 MG tablet   Generic drug:  canagliflozin     90 tablet    Take 1 tablet (100 mg) by mouth every morning (before breakfast)       lidocaine-prilocaine cream    EMLA    5800 g    Apply topically as needed for moderate pain       losartan 25 MG tablet    COZAAR    90 tablet    Take 1 tablet (25 mg) by mouth daily       meclizine 12.5 MG tablet    ANTIVERT     Take 12.5  mg by mouth 3 times daily as needed       * order for DME     400 each    Test strips for pt's glucometer, brand as covered by insurance. Test four times daily and prn.       * order for DME     400 each    Test strips for pt's glucometer, brand as covered by insurance. Test four times daily and prn.       * order for DME     400 each    Lancets.  Four times daily and prn.       pregabalin 150 MG capsule    LYRICA    60 capsule    Take 1 capsule (150 mg) by mouth 2 times daily       ranitidine 150 MG tablet    ZANTAC    180 tablet    Take 1 tablet (150 mg) by mouth 2 times daily       tamsulosin 0.4 MG capsule    FLOMAX    90 capsule    Take 1 capsule (0.4 mg) by mouth daily       thiamine 100 MG tablet      Take 100 mg by mouth       traMADol 50 MG tablet    ULTRAM    120 tablet    Take 1-2 tablets ( mg) by mouth every 6 hours as needed for pain maximum 4 tablet(s) per day       traZODone 50 MG tablet    DESYREL    25 tablet    TAKE ONE-HALF TO ONE TABLET EACH NIGHT AT BEDTIME       * Notice:  This list has 6 medication(s) that are the same as other medications prescribed for you. Read the directions carefully, and ask your doctor or other care provider to review them with you.

## 2017-05-17 ASSESSMENT — ASTHMA QUESTIONNAIRES: ACT_TOTALSCORE: 20

## 2017-05-25 ENCOUNTER — OFFICE VISIT (OUTPATIENT)
Dept: OPHTHALMOLOGY | Facility: CLINIC | Age: 62
End: 2017-05-25
Payer: MEDICARE

## 2017-05-25 ENCOUNTER — APPOINTMENT (OUTPATIENT)
Dept: OPTOMETRY | Facility: CLINIC | Age: 62
End: 2017-05-25
Payer: COMMERCIAL

## 2017-05-25 DIAGNOSIS — N18.30 TYPE 2 DIABETES MELLITUS WITH STAGE 3 CHRONIC KIDNEY DISEASE, WITH LONG-TERM CURRENT USE OF INSULIN (H): Primary | ICD-10-CM

## 2017-05-25 DIAGNOSIS — H52.4 PRESBYOPIA: ICD-10-CM

## 2017-05-25 DIAGNOSIS — E11.22 TYPE 2 DIABETES MELLITUS WITH STAGE 3 CHRONIC KIDNEY DISEASE, WITH LONG-TERM CURRENT USE OF INSULIN (H): Primary | ICD-10-CM

## 2017-05-25 DIAGNOSIS — H40.003 GLAUCOMA SUSPECT, BILATERAL: ICD-10-CM

## 2017-05-25 DIAGNOSIS — Z79.4 TYPE 2 DIABETES MELLITUS WITH STAGE 3 CHRONIC KIDNEY DISEASE, WITH LONG-TERM CURRENT USE OF INSULIN (H): Primary | ICD-10-CM

## 2017-05-25 PROCEDURE — 92004 COMPRE OPH EXAM NEW PT 1/>: CPT | Performed by: STUDENT IN AN ORGANIZED HEALTH CARE EDUCATION/TRAINING PROGRAM

## 2017-05-25 PROCEDURE — 92341 FIT SPECTACLES BIFOCAL: CPT | Performed by: OPTOMETRIST

## 2017-05-25 PROCEDURE — 92015 DETERMINE REFRACTIVE STATE: CPT | Mod: GY | Performed by: STUDENT IN AN ORGANIZED HEALTH CARE EDUCATION/TRAINING PROGRAM

## 2017-05-25 ASSESSMENT — TONOMETRY
OD_IOP_MMHG: 19
OS_IOP_MMHG: 18
IOP_METHOD: APPLANATION

## 2017-05-25 ASSESSMENT — SLIT LAMP EXAM - LIDS
COMMENTS: NORMAL
COMMENTS: NORMAL

## 2017-05-25 ASSESSMENT — CONF VISUAL FIELD
OS_INFERIOR_NASAL_RESTRICTION: 3
OD_SUPERIOR_NASAL_RESTRICTION: 3
OS_SUPERIOR_TEMPORAL_RESTRICTION: 3
OD_INFERIOR_TEMPORAL_RESTRICTION: 3
OS_INFERIOR_TEMPORAL_RESTRICTION: 3
OS_SUPERIOR_NASAL_RESTRICTION: 3
OD_SUPERIOR_TEMPORAL_RESTRICTION: 3
OD_INFERIOR_NASAL_RESTRICTION: 3

## 2017-05-25 ASSESSMENT — REFRACTION_MANIFEST
OS_AXIS: 175
OS_CYLINDER: +1.00
OD_ADD: +2.75
OD_CYLINDER: +1.00
OD_AXIS: 155
OD_SPHERE: -0.25
OS_SPHERE: PLANO
OS_ADD: +2.75

## 2017-05-25 ASSESSMENT — VISUAL ACUITY
OS_SC+: -1
OS_SC: 20/80
OD_SC: 20/70
OD_SC+: -2
METHOD: SNELLEN - LINEAR

## 2017-05-25 ASSESSMENT — REFRACTION_WEARINGRX
OS_CYLINDER: SPHERE
SPECS_TYPE: OTC READERS
OS_SPHERE: +2.50
OD_CYLINDER: SPHERE
OD_SPHERE: +2.50

## 2017-05-25 ASSESSMENT — CUP TO DISC RATIO
OS_RATIO: 0.4
OD_RATIO: 0.6

## 2017-05-25 NOTE — MR AVS SNAPSHOT
"              After Visit Summary   5/25/2017    Sandra Dos Santos    MRN: [id number]           Patient Information     Date Of Birth          1955        Visit Information        Provider Department      5/25/2017 1:15 PM Blanca Beyer MD; MULTILINGUAL WORD [facility]        Today's Diagnoses     Type 2 diabetes mellitus with stage 3 chronic kidney disease, with long-term current use of insulin (H)    -  1    Presbyopia        Glaucoma suspect, bilateral          Care Instructions    Continue to monitor for glaucoma suspicion   Use artificial tears up to 4 times per day (Refresh Plus, Systane Balance, or generic artificial tears are ok. Avoid \"get the red out\" drops).   Glasses prescription given - optional     Blanca LEE. Pepito ZIMMERMAN  [phone]    Diabetes weakens the blood vessels all over the body, including the eyes. Damage to the blood vessels in the eyes can cause swelling or bleeding into part of the eye (called the retina). This is called diabetic retinopathy (JOSE-tin--pu-thee). If not treated, this disease can cause vision loss or blindness.   Symptoms may include blurred or distorted vision, but many people have no symptoms. It's important to see your eye doctor regularly to check for problems.   Early treatment and good control can help protect your vision. Here are the things you can do to help prevent vision loss:      1. Keep your blood sugar levels under tight control.      2. Bring high blood pressure under control.      3. No smoking.      4. Have yearly dilated eye exams.            Follow-ups after your visit        Follow-up notes from your care team     Return in about 3 months (around 8/25/2017) for IOP check, HVF, OCT optic nerve, Pachymetry.      Your next 10 appointments already scheduled     May 30, 2017 10:15 AM CDT   Office Visit with Gato Newman, [facility] ([facility])    [address] [place] [address] " "[id number] [phone]           Bring a current list of meds and any records pertaining to this visit.  For Physicals, please bring immunization records and any forms needing to be filled out.  Please arrive 10 minutes early to complete paperwork.            May 31, 2017  2:30 PM CDT   New Visit with Lita Alaniz MD   [facility] ([facility]    [address]  [place] [address]   [phone]            Jul 14, 2017  2:00 PM CDT   Return Visit with Simon Andrade MD   [facility] ([facility])    [address]  [place] [address] [phone]              Who to contact     If you have questions or need follow up information about today's clinic visit or your schedule please contact [facility] directly at [phone].  Normal or non-critical lab and imaging results will be communicated to you by Trly Uniqhart, letter or phone within 4 business days after the clinic has received the results. If you do not hear from us within 7 days, please contact the clinic through Trly Uniqhart or phone. If you have a critical or abnormal lab result, we will notify you by phone as soon as possible.  Submit refill requests through avocadostore or call your pharmacy and they will forward the refill request to us. Please allow 3 business days for your refill to be completed.          Additional Information About Your Visit        Trly UniqharFeedback Information     avocadostore lets you send messages to your doctor, view your test results, renew your prescriptions, schedule appointments and more. To sign up, go to [email or web address] . Click on \"Log in\" on the left side of the screen, which will take you to the Welcome page. Then click on \"Sign up Now\" on the right side of the page.     You will be asked to enter the access code listed below, as well as some personal information. Please follow the directions to create your username and password.   "   Your access code is: [email or web address]  Expires: 2017  2:49 PM     Your access code will  in 90 days. If you need help or a new code, please call your [place] clinic or [phone].        Care EveryWhere ID     This is your Care EveryWhere ID. This could be used by other organizations to access your [place] medical records  [id number]         Blood Pressure from Last 3 Encounters:   17 134/86   17 170/85   17 132/74    Weight from Last 3 Encounters:   17 114.3 kg (252 lb)   17 112.5 kg (248 lb)   17 113.4 kg (250 lb)              Today, you had the following     No orders found for display       Primary Care Provider    Physician No Ref-Primary       No address on file        Thank you!     Thank you for choosing [facility]  for your care. Our goal is always to provide you with excellent care. Hearing back from our patients is one way we can continue to improve our services. Please take a few minutes to complete the written survey that you may receive in the mail after your visit with us. Thank you!             Your Updated Medication List - Protect others around you: Learn how to safely use, store and throw away your medicines at www.disposemymeds.org.          This list is accurate as of: 17  2:39 PM.  Always use your most recent med list.                   Brand Name Dispense Instructions for use    ACCU-CHEK ULISES PLUS test strip   Generic drug:  blood glucose monitoring          * albuterol 1.25 MG/3ML nebulizer solution    ACCUNEB     Take 1 vial by nebulization every 6 hours as needed for shortness of breath / dyspnea or wheezing Reported on 2017       * albuterol (2.5 MG/3ML) 0.083% neb solution     25 vial    Take 1 vial (2.5 mg) by nebulization every 4 hours as needed for shortness of breath / dyspnea or wheezing       * albuterol 108 (90 BASE) MCG/ACT Inhaler    PROAIR HFA/PROVENTIL HFA/VENTOLIN HFA    1 Inhaler    Inhale 2 puffs into  the lungs every 4 hours as needed for shortness of breath / dyspnea or wheezing       atorvastatin 10 MG tablet    LIPITOR    90 tablet    Take 1 tablet (10 mg) by mouth daily       azithromycin 250 MG tablet    ZITHROMAX    6 tablet    Two tablets first day, then one tablet daily for four days.       cholecalciferol 5000 UNITS Tabs tablet    vitamin D3     Take by mouth daily       cyclobenzaprine 10 MG tablet    FLEXERIL    30 tablet    Take 0.5-1 tablets (5-10 mg) by mouth 3 times daily as needed for muscle spasms       glimepiride 1 MG tablet    AMARYL    180 tablet    TAKE 3 TABLETS (3 MG) BY MOUTH 2 TIMES DAILY (WITH MEALS)       GLOBAL EASE INJECT PEN NEEDLES 31G X 5 MM   Generic drug:  insulin pen needle          insulin glargine U-300 300 UNIT/ML injection    TOUJEO SOLOSTAR    6 mL    Inject 14 Units Subcutaneous At Bedtime       INVOKANA 100 MG tablet   Generic drug:  canagliflozin     90 tablet    Take 1 tablet (100 mg) by mouth every morning (before breakfast)       lidocaine-prilocaine cream    EMLA    5800 g    Apply topically as needed for moderate pain       losartan 25 MG tablet    COZAAR    90 tablet    Take 1 tablet (25 mg) by mouth daily       meclizine 12.5 MG tablet    ANTIVERT     Take 12.5 mg by mouth 3 times daily as needed       * order for DME     400 each    Test strips for pt's glucometer, brand as covered by insurance. Test four times daily and prn.       * order for DME     400 each    Test strips for pt's glucometer, brand as covered by insurance. Test four times daily and prn.       * order for DME     400 each    Lancets.  Four times daily and prn.       pregabalin 150 MG capsule    LYRICA    60 capsule    Take 1 capsule (150 mg) by mouth 2 times daily       ranitidine 150 MG tablet    ZANTAC    180 tablet    Take 1 tablet (150 mg) by mouth 2 times daily       tamsulosin 0.4 MG capsule    FLOMAX    90 capsule    Take 1 capsule (0.4 mg) by mouth daily       thiamine 100 MG tablet       Take 100 mg by mouth       traMADol 50 MG tablet    ULTRAM    120 tablet    Take 1-2 tablets ( mg) by mouth every 6 hours as needed for pain maximum 4 tablet(s) per day       traZODone 50 MG tablet    DESYREL    25 tablet    TAKE ONE-HALF TO ONE TABLET EACH NIGHT AT BEDTIME       * Notice:  This list has 6 medication(s) that are the same as other medications prescribed for you. Read the directions carefully, and ask your doctor or other care provider to review them with you.

## 2017-05-25 NOTE — PROGRESS NOTES
" Current Eye Medications:  None     Subjective: Here for DM exam. Last a1c was 9.0 on 5-16-17, up from the 7.8 range.  Blood sugar was 184 a couple days ago, usually 120 in the morning.  Vision is getting blurred both up close and far away.  Last complete was a year, lost the glasses one month ago.  Eyes feel dry as well.      Objective:  See Ophthalmology Exam.      Assessment:  Sandra Dos Santos is a 62 year old male who presents with:     Type 2 diabetes mellitus with stage 3 chronic kidney disease, with long-term current use of insulin (H) Negative diabetic retinopathy      Glaucoma suspect, bilateral Based on asymmetric C:D right eye>OS       Plan:  Continue to monitor for glaucoma suspicion   Use artificial tears up to 4 times per day (Refresh Plus, Systane Balance, or generic artificial tears are ok. Avoid \"get the red out\" drops).   Glasses prescription given - optional     Blanca Beyer MD  [phone]               "

## 2017-05-25 NOTE — PATIENT INSTRUCTIONS
"Continue to monitor for glaucoma suspicion   Use artificial tears up to 4 times per day (Refresh Plus, Systane Balance, or generic artificial tears are ok. Avoid \"get the red out\" drops).   Glasses prescription given - optional     Blanca Beyer MD  [phone]    Diabetes weakens the blood vessels all over the body, including the eyes. Damage to the blood vessels in the eyes can cause swelling or bleeding into part of the eye (called the retina). This is called diabetic retinopathy ([facility]). If not treated, this disease can cause vision loss or blindness.   Symptoms may include blurred or distorted vision, but many people have no symptoms. It's important to see your eye doctor regularly to check for problems.   Early treatment and good control can help protect your vision. Here are the things you can do to help prevent vision loss:      1. Keep your blood sugar levels under tight control.      2. Bring high blood pressure under control.      3. No smoking.      4. Have yearly dilated eye exams.    "

## 2017-05-30 ENCOUNTER — OFFICE VISIT (OUTPATIENT)
Dept: PHARMACY | Facility: CLINIC | Age: 62
End: 2017-05-30
Payer: COMMERCIAL

## 2017-05-30 ENCOUNTER — TRANSFERRED RECORDS (OUTPATIENT)
Dept: HEALTH INFORMATION MANAGEMENT | Facility: CLINIC | Age: 62
End: 2017-05-30

## 2017-05-30 VITALS — SYSTOLIC BLOOD PRESSURE: 130 MMHG | DIASTOLIC BLOOD PRESSURE: 82 MMHG

## 2017-05-30 DIAGNOSIS — G89.4 CHRONIC PAIN SYNDROME: ICD-10-CM

## 2017-05-30 DIAGNOSIS — N40.0 BENIGN PROSTATIC HYPERPLASIA, PRESENCE OF LOWER URINARY TRACT SYMPTOMS UNSPECIFIED, UNSPECIFIED MORPHOLOGY: ICD-10-CM

## 2017-05-30 DIAGNOSIS — G47.00 INSOMNIA, UNSPECIFIED TYPE: ICD-10-CM

## 2017-05-30 DIAGNOSIS — H04.123 DRY EYES: ICD-10-CM

## 2017-05-30 DIAGNOSIS — K21.9 GASTROESOPHAGEAL REFLUX DISEASE WITHOUT ESOPHAGITIS: ICD-10-CM

## 2017-05-30 DIAGNOSIS — E63.9 NUTRITIONAL DEFICIENCY: ICD-10-CM

## 2017-05-30 DIAGNOSIS — I10 HYPERTENSION GOAL BP (BLOOD PRESSURE) < 140/90: ICD-10-CM

## 2017-05-30 DIAGNOSIS — E11.22 TYPE 2 DIABETES MELLITUS WITH STAGE 3 CHRONIC KIDNEY DISEASE, WITH LONG-TERM CURRENT USE OF INSULIN (H): Primary | ICD-10-CM

## 2017-05-30 DIAGNOSIS — N18.30 TYPE 2 DIABETES MELLITUS WITH STAGE 3 CHRONIC KIDNEY DISEASE, WITH LONG-TERM CURRENT USE OF INSULIN (H): Primary | ICD-10-CM

## 2017-05-30 DIAGNOSIS — E78.5 HYPERLIPIDEMIA LDL GOAL <100: ICD-10-CM

## 2017-05-30 DIAGNOSIS — Z79.4 TYPE 2 DIABETES MELLITUS WITH STAGE 3 CHRONIC KIDNEY DISEASE, WITH LONG-TERM CURRENT USE OF INSULIN (H): Primary | ICD-10-CM

## 2017-05-30 PROCEDURE — 99607 MTMS BY PHARM ADDL 15 MIN: CPT | Performed by: PHARMACIST

## 2017-05-30 PROCEDURE — 99605 MTMS BY PHARM NP 15 MIN: CPT | Performed by: PHARMACIST

## 2017-05-30 RX ORDER — ACETAMINOPHEN 500 MG
500-1000 TABLET ORAL EVERY 6 HOURS PRN
COMMUNITY
End: 2017-09-26

## 2017-05-30 NOTE — Clinical Note
FYI- plan to follow-up after Ramadan  Plan: 1. Continue current medication/doses.  2. Provided patient recommendations from opthalmology.  3. Future considerations - aspirin placeholder?  Gilmer Newman, CarleneD Medication Therapy Management Provider Pager (voicemail): [phone]

## 2017-05-30 NOTE — PROGRESS NOTES
SUBJECTIVE/OBJECTIVE:                           Sandra Dos Santos is a 62 year old male coming in for an initial visit for Medication Therapy Management.  He was referred to me from Dr. Leslie. We were joined later in the visit by , but patient confirmed that he was able to understand our conversations during our visit.      Chief Complaint: Diabetes Management.  Personal Healthcare Goals: keep his sugars under control    Allergies/ADRs: Reviewed in Epic  Tobacco: No tobacco use   Alcohol: none  Caffeine: 6 cups/day of tea  Activity: Walks about 3-4 blocks a day now since knee surgery - slowly building up.  Still has issues with lower back.    PMH: Reviewed in Epic    Medication Adherence: no issues reported - patient transfers his medications from his bottles to a pill box, but does not set up as daily.  He pours the bottle into it's own slot and is able to demonstrate good understanding of what each medication is and how he is supposed to take it.    Diabetes:  Pt currently taking glimepride 3 mg twice daily, Invokana 100 mg daily, and Lantus 12 units daily (he has in the past changed his dose depending on how well his sugars have been doing). Pt is not experiencing side effects.  SMBG: one time daily.   Ranges (patient reported): low 100s mg/dL since the start of Ramadan  Patient is not experiencing hypoglycemia  Recent symptoms of high blood sugar? vision changes and headache. Did have dizziness the other day that he associated with high sugars, but was diagnosed with vertigo and given meclizine (does not have with him today).  He is scheduled to see neurology regarding   Eye exam: up to date  Foot exam: up to date  Microalbumin is < 30 mg/g. Pt is taking an ACEi/ARB.  Aspirin: Not taking due to previous side effects  Diet/Exercise: Currently participating in Ramadan and is fasting until meal late at night.  He used to go to the gym, but during fasting he tends to just go for walks to avoid his sugars  getting too low.     Back Pain: Pt states that he has issues with lower back pain that sometimes can cause sciatica. He is currently taking tramadol 100 mg twice daily, Lyrica 150 mg twice daily, and acetaminophen 1000 mg TID PRN. He is avoiding NSAIDs due to his kidneys. Finds the tramadol to be most beneficial.  Seeing pain clinic provider in July. Denies any side effects - manages constipation with natural herbs from [place] (does not know name).      Hypertension: Current medications include losartan 25 mg daily.  Patient does self-monitor BP. Home BP monitoring in range of 130's systolic over 80's diastolic.  Patient reports no current medication side effects.    Hyperlipidemia: Current therapy includes atorvastatin 10 mg once daily.  Pt reports no significant myalgias or other side effects.   The 10-year ASCVD risk score ([place] GRECO Jr, et al., 2013) is: 19.7%    Values used to calculate the score:      Age: 62 years      Sex: Male      Is Non- : No      Diabetic: Yes      Tobacco smoker: No      Systolic Blood Pressure: 134 mmHg      Is BP treated: Yes      HDL Cholesterol: 50 mg/dL      Total Cholesterol: 162 mg/dL    Dry Eyes: Pt was recently seen by opthalmology on 5/25/17 after higher blood sugar and vision changes. Feels eyes are dry - was recommended to get eye drops, but never did. Noted as negative for diabetic retinopathy.  Continuing to monitor for glaucoma. Concerned about recent changes in vision, but has planned follow-up in August 2017.    GERD: Current medications include: Zantac (ranitidine) 150 mg twice daily. Pt c/o no current symptoms.  Patient feels that current regimen is effective.    BPH: Pt is taking tamsulosin 0.4 mg daily. Pt finds this to be effective and denies any issues.    Insomnia: Current medications include: trazodone 50 mg at bedtime. Pt reports trouble falling asleep. Feels that this helps enough and he gets enough rest at current dose - does not  want to increase dose at this time.     Supplements: Pt is taking thiamine 100 mg daily and vitamin D3 5000 units daily. Denies any issues.    Current labs include:  BP Readings from Last 3 Encounters:   05/16/17 134/86   05/04/17 170/85   03/22/17 132/74     Today's Vitals: /82    Lab Results   Component Value Date    A1C 9.0 05/16/2017    A1C 8.7 03/08/2017    A1C 7.8 01/04/2017    A1C 8.1 11/29/2016    A1C 7.3 09/15/2010     GFR Estimate   Date Value Ref Range Status   11/29/2016 60 (L) >60 mL/min/1.7m2 Final     Comment:     Non  GFR Calc   10/15/2012 >90 >60 mL/min/1.7m2 Final     GFR Estimate If Black   Date Value Ref Range Status   11/29/2016 73 >60 mL/min/1.7m2 Final     Comment:      GFR Calc   10/15/2012 >90 >60 mL/min/1.7m2 Final     Recent Labs   Lab Test  01/04/17   0757  11/29/16   1101  09/15/10   1232   CHOL  162  212*  161   HDL  50  45  51   LDL  73  122*  77   TRIG  197*  226*  167*   CHOLHDLRATIO   --    --   3.0     Wt Readings from Last 5 Encounters:   05/16/17 252 lb (114.3 kg)   05/04/17 248 lb (112.5 kg)   03/22/17 250 lb (113.4 kg)   03/08/17 248 lb (112.5 kg)   02/02/17 253 lb (114.8 kg)     Most Recent Immunizations   Administered Date(s) Administered     Hepatitis B 03/08/2017     Influenza (IIV3) 08/01/2016     Influenza Vaccine IM 3yrs+ 4 Valent IIV4 11/28/2016     Pneumococcal 23 valent 12/02/2016     TDAP Vaccine (Adacel) 12/02/2016     Zoster vaccine, live 03/01/2015     ASSESSMENT:                             Current medications were reviewed today.     Medication Adherence: no issues identified    Diabetes: Improved. Patient is not meeting A1c goal of < 7%. Self monitoring of blood glucose is at goal of fasting  mg/dL.  Discussed importance of staying at same dose with Toujeo insulin.  Given current diet during Ramadan patient would benefit from continuing lower dose of Toujeo and continuing to monitor blood sugars closely/report low  blood sugars.  Not on aspirin - will clarify side effects at follow-up visit. Likely will benefit from follow-up once back to normal diet.     Back Pain: Stable per patient. Appointment scheduled with pain clinic.     Hypertension: Stable. Pt is meeting BP goal of <140/90 mmHg.     Hyperlipidemia: Stable. Pt is not on high intensity statin which is indicated based on 2013 ACC/AHA guidelines for lipid management.  Further increase in statin dose not warranted as LDL is well controlled (close to or less than 40mg/dL).    Dry Eyes: Needs Improvement. Pt did not understand instructions to try eye drops. Follow-up in place.     GERD: Stable per patient.    BPH: Stable per patient.     Insomnia: Stable. May benefit from increased dose, but patient prefers to keep same dose at this time.     Supplements: Stable.     PLAN:                            1. Continue current medication/doses.   2. Provided patient recommendations from opthalmology.   3. Future considerations - aspirin placeholder?    I spent 60 minutes with this patient today. A copy of the visit note was provided to the patient's primary care provider.    Will follow up in 1 month or sooner if needed.    The patient was given a summary of these recommendations as an after visit summary.     Gilmer Newman, James  Medication Therapy Management Provider  Pager (voicemail): [phone]

## 2017-05-30 NOTE — PATIENT INSTRUCTIONS
"Recommendations from today's MTM visit:                                                    MTM (medication therapy management) is a service provided by a clinical pharmacist designed to help you get the most of out of your medicines.   Today we reviewed what your medicines are for, how to know if they are working, that your medicines are safe and how to make your medicine regimen as easy as possible.     1. You should continue to take your 12 units of your insulin everyday while fasting to make sure you do not have low blood sugars.  Continue taking your Invokana or glimepiride.         2. To help with constipation you can try things that are higher in fiber such as prunes, vegetables, and whole grains.  For constipation medication you could use things such as Senna or Miralax to help if your natural herbs are not being effective.         3. Vegetables are good options for people with diabetes.  The ones you should avoid since they have more sugar are things like corn and peas.       4. It is safe to use Tylenol everyday for extra pain relief in addition to your Lyrica and tramadol.  This is safe for both your stomach and kidneys.  You do not want to use more than 1000 mg three times a daily. A maximum dose of 3000 mg a day is a good dose.    5. Use artificial tears up to 4 times per day (Refresh Plus, Systane Balance, or generic artificial tears are ok. Avoid \"get the red out\" drops).     Next MTM visit: 1 month - make an appointment at the start of July when you are no longer fasting    To schedule another MTM appointment, please call the clinic directly or you may call the MTM scheduling line at [phone] or toll-free at [phone].     My Clinical Pharmacist's contact information:                                                      It was a pleasure seeing you today!  Please feel free to contact me with any questions or concerns you have.      Gilmer Newman, James  Medication Therapy Management " Provider  Pager (voicemail): [phone]    You may receive a survey about the MTM services you received.  I would appreciate your feedback to help me serve you better in the future. Please fill it out and return it when you can. Your comments will be anonymous.

## 2017-05-30 NOTE — MR AVS SNAPSHOT
"              After Visit Summary   5/30/2017    Sandra Dos Santos    MRN: [id number]           Patient Information     Date Of Birth          1955        Visit Information        Provider Department      5/30/2017 10:15 AM Gato Newman [facility]; [facility] MTM        Care Instructions    Recommendations from today's MTM visit:                                                    MTM (medication therapy management) is a service provided by a clinical pharmacist designed to help you get the most of out of your medicines.   Today we reviewed what your medicines are for, how to know if they are working, that your medicines are safe and how to make your medicine regimen as easy as possible.     1. You should continue to take your 12 units of your insulin everyday while fasting to make sure you do not have low blood sugars.  Continue taking your Invokana or glimepiride.         2. To help with constipation you can try things that are higher in fiber such as prunes, vegetables, and whole grains.  For constipation medication you could use things such as Senna or Miralax to help if your natural herbs are not being effective.         3. Vegetables are good options for people with diabetes.  The ones you should avoid since they have more sugar are things like corn and peas.       4. It is safe to use Tylenol everyday for extra pain relief in addition to your Lyrica and tramadol.  This is safe for both your stomach and kidneys.  You do not want to use more than 1000 mg three times a daily. A maximum dose of 3000 mg a day is a good dose.    5. Use artificial tears up to 4 times per day (Refresh Plus, Systane Balance, or generic artificial tears are ok. Avoid \"get the red out\" drops).     Next MTM visit: 1 month - make an appointment at the start of July when you are no longer fasting    To schedule another MTM appointment, please call the clinic directly or you may call the MTM scheduling line at " [phone] or toll-free at [phone].     My Clinical Pharmacist's contact information:                                                      It was a pleasure seeing you today!  Please feel free to contact me with any questions or concerns you have.      Gilmer Newman, James  Medication Therapy Management Provider  Pager (voicemail): [phone]    You may receive a survey about the [facility] services you received.  I would appreciate your feedback to help me serve you better in the future. Please fill it out and return it when you can. Your comments will be anonymous.              Follow-ups after your visit        Your next 10 appointments already scheduled     May 31, 2017  2:30 PM CDT   [facility]iz [facility]    [address] [place] [place] [id number] [phone]            Jul 14, 2017  2:00 PM CDT   Return Visit with Simon Andrade MD   [facility] ([facility])    [address] [id number] [phone]            Aug 25, 2017  2:15 PM CDT   [facility] Visit with Blanca Beyer MD   [facility]    [address] [id number] [phone]              Who to contact     If you have questions or need follow up information about today's clinic visit or your schedule please contact [facility] directly at [phone].  Normal or non-critical lab and imaging results will be communicated to you by [employer], letter or phone within 4 business days after the clinic has received the results. If you do not hear from us within 7 days, please contact the clinic through MyChart or phone. If you have a critical or abnormal lab result, we will notify you by phone as soon as possible.  Submit refill requests through [employer] or call your pharmacy and they will forward the refill request to us. Please allow 3 business  "days for your refill to be completed.          Additional Information About Your Visit        MyChart Information     [employer] lets you send messages to your doctor, view your test results, renew your prescriptions, schedule appointments and more. To sign up, go to [email or web address] . Click on \"Log in\" on the left side of the screen, which will take you to the Welcome page. Then click on \"Sign up Now\" on the right side of the page.     You will be asked to enter the access code listed below, as well as some personal information. Please follow the directions to create your username and password.     Your access code is: [email or web address]  Expires: 2017  2:49 PM     Your access code will  in 90 days. If you need help or a new code, please call your [facility] clinic or [phone].        Care EveryWhere ID     This is your Care EveryWhere ID. This could be used by other organizations to access your [facility] medical records  [id number]         Blood Pressure from Last 3 Encounters:   17 134/86   17 170/85   17 132/74    Weight from Last 3 Encounters:   17 252 lb (114.3 kg)   17 248 lb (112.5 kg)   17 250 lb (113.4 kg)              Today, you had the following     No orders found for display       Primary Care Provider    Physician No Ref-Primary       No address on file        Thank you!     Thank you for choosing [facility]  for your care. Our goal is always to provide you with excellent care. Hearing back from our patients is one way we can continue to improve our services. Please take a few minutes to complete the written survey that you may receive in the mail after your visit with us. Thank you!             Your Updated Medication List - Protect others around you: Learn how to safely use, store and throw away your medicines at www.disposemymeds.org.          This list is accurate as of: 17 11:32 AM.  Always use your most recent med list.       "             Brand Name Dispense Instructions for use    ACCU-CHEK ULISES PLUS test strip   Generic drug:  blood glucose monitoring          acetaminophen 500 MG tablet    TYLENOL     Take 500-1,000 mg by mouth every 6 hours as needed for mild pain       * albuterol 1.25 MG/3ML nebulizer solution    ACCUNEB     Take 1 vial by nebulization every 6 hours as needed for shortness of breath / dyspnea or wheezing Reported on 5/16/2017       * albuterol (2.5 MG/3ML) 0.083% neb solution     25 vial    Take 1 vial (2.5 mg) by nebulization every 4 hours as needed for shortness of breath / dyspnea or wheezing       * albuterol 108 (90 BASE) MCG/ACT Inhaler    PROAIR HFA/PROVENTIL HFA/VENTOLIN HFA    1 Inhaler    Inhale 2 puffs into the lungs every 4 hours as needed for shortness of breath / dyspnea or wheezing       atorvastatin 10 MG tablet    LIPITOR    90 tablet    Take 1 tablet (10 mg) by mouth daily       azithromycin 250 MG tablet    ZITHROMAX    6 tablet    Two tablets first day, then one tablet daily for four days.       cholecalciferol 5000 UNITS Tabs tablet    vitamin D3     Take by mouth daily       cyclobenzaprine 10 MG tablet    FLEXERIL    30 tablet    Take 0.5-1 tablets (5-10 mg) by mouth 3 times daily as needed for muscle spasms       glimepiride 1 MG tablet    AMARYL    180 tablet    TAKE 3 TABLETS (3 MG) BY MOUTH 2 TIMES DAILY (WITH MEALS)       GLOBAL EASE INJECT PEN NEEDLES 31G X 5 MM   Generic drug:  insulin pen needle          insulin glargine U-300 300 UNIT/ML injection    TOUJEO SOLOSTAR    6 mL    Inject 14 Units Subcutaneous At Bedtime       INVOKANA 100 MG tablet   Generic drug:  canagliflozin     90 tablet    Take 1 tablet (100 mg) by mouth every morning (before breakfast)       lidocaine-prilocaine cream    EMLA    5800 g    Apply topically as needed for moderate pain       losartan 25 MG tablet    COZAAR    90 tablet    Take 1 tablet (25 mg) by mouth daily       meclizine 12.5 MG tablet    ANTIVERT      Take 12.5 mg by mouth 3 times daily as needed       * order for DME     400 each    Test strips for pt's glucometer, brand as covered by insurance. Test four times daily and prn.       * order for DME     400 each    Test strips for pt's glucometer, brand as covered by insurance. Test four times daily and prn.       * order for DME     400 each    Lancets.  Four times daily and prn.       pregabalin 150 MG capsule    LYRICA    60 capsule    Take 1 capsule (150 mg) by mouth 2 times daily       ranitidine 150 MG tablet    ZANTAC    180 tablet    Take 1 tablet (150 mg) by mouth 2 times daily       tamsulosin 0.4 MG capsule    FLOMAX    90 capsule    Take 1 capsule (0.4 mg) by mouth daily       thiamine 100 MG tablet      Take 100 mg by mouth       traMADol 50 MG tablet    ULTRAM    120 tablet    Take 1-2 tablets ( mg) by mouth every 6 hours as needed for pain maximum 4 tablet(s) per day       traZODone 50 MG tablet    DESYREL    25 tablet    TAKE ONE-HALF TO ONE TABLET EACH NIGHT AT BEDTIME       * Notice:  This list has 6 medication(s) that are the same as other medications prescribed for you. Read the directions carefully, and ask your doctor or other care provider to review them with you.

## 2017-05-31 ENCOUNTER — OFFICE VISIT (OUTPATIENT)
Dept: NEUROLOGY | Facility: CLINIC | Age: 62
End: 2017-05-31
Payer: MEDICARE

## 2017-05-31 VITALS
HEART RATE: 69 BPM | BODY MASS INDEX: 36.39 KG/M2 | DIASTOLIC BLOOD PRESSURE: 77 MMHG | WEIGHT: 254.2 LBS | HEIGHT: 70 IN | SYSTOLIC BLOOD PRESSURE: 125 MMHG

## 2017-05-31 DIAGNOSIS — I67.1 ANEURYSM OF CAVERNOUS PORTION OF LEFT INTERNAL CAROTID ARTERY: Primary | ICD-10-CM

## 2017-05-31 PROCEDURE — 99204 OFFICE O/P NEW MOD 45 MIN: CPT | Performed by: PSYCHIATRY & NEUROLOGY

## 2017-05-31 NOTE — PROGRESS NOTES
INITIAL NEUROLOGY CONSULTATION    LOCATION: [facility]   DATE OF VISIT: 2017  MRN: [id number]  NAME: Mr. Sandra Dos Santos  :  1955 (62 year old)    REFERRING/PRIMARY CARE PROVIDER: Dr. Deborah Leslie    REASON FOR CONSULTATION: Right intracranial internal carotid artery aneurysm    HISTORY OF PRESENT ILLNESS ([place]):  Mr. Dos Santos is seen at the request of Dr. Deborah Leslie #1  Accompanied by Pashtos speaking .  62-year-old man had sudden onset of vertigo 3 weeks ago while getting up in the morning. He went to hold nearby object. Denies falling on the ground. His vertigo was of short duration. His wife took him to the [facility] . He was worked up in the ER. He was found to have small intracranial left cavernous ICA aneurysm on the MRA cranial angiography study.     Additional symptom is that he has noticed swelling over the cheek bone, in front of the tragus, from time to time for last 3-4 years. This swelling tends to last 10-15 minutes. He relates that this swelling tends to occur on both sides.      Review of Systems: All negative except as noted in the [place] and Identifying information.    Current Outpatient Prescriptions on File Prior to Visit:  acetaminophen (TYLENOL) 500 MG tablet Take 500-1,000 mg by mouth every 6 hours as needed for mild pain   glimepiride (AMARYL) 1 MG tablet TAKE 3 TABLETS (3 MG) BY MOUTH 2 TIMES DAILY (WITH MEALS)   traZODone (DESYREL) 50 MG tablet TAKE ONE-HALF TO ONE TABLET EACH NIGHT AT BEDTIME   atorvastatin (LIPITOR) 10 MG tablet Take 1 tablet (10 mg) by mouth daily   INVOKANA 100 MG tablet Take 1 tablet (100 mg) by mouth every morning (before breakfast)   albuterol (2.5 MG/3ML) 0.083% neb solution Take 1 vial (2.5 mg) by nebulization every 4 hours as needed for shortness of breath / dyspnea or wheezing   insulin glargine U-300 (TOUJEO SOLOSTAR) 300 UNIT/ML injection Inject 14 Units Subcutaneous At Bedtime (Patient  taking differently: Inject 12 Units Subcutaneous At Bedtime )   tamsulosin (FLOMAX) 0.4 MG capsule Take 1 capsule (0.4 mg) by mouth daily   ranitidine (ZANTAC) 150 MG tablet Take 1 tablet (150 mg) by mouth 2 times daily   order for DME Test strips for pt's glucometer, brand as covered by insurance. Test four times daily and prn.   order for DME Lancets.  Four times daily and prn.   albuterol (PROAIR HFA/PROVENTIL HFA/VENTOLIN HFA) 108 (90 BASE) MCG/ACT Inhaler Inhale 2 puffs into the lungs every 4 hours as needed for shortness of breath / dyspnea or wheezing   pregabalin (LYRICA) 150 MG capsule Take 1 capsule (150 mg) by mouth 2 times daily   traMADol (ULTRAM) 50 MG tablet Take 1-2 tablets ( mg) by mouth every 6 hours as needed for pain maximum 4 tablet(s) per day   lidocaine-prilocaine (EMLA) cream Apply topically as needed for moderate pain   order for DME Test strips for pt's glucometer, brand as covered by insurance. Test four times daily and prn.   losartan (COZAAR) 25 MG tablet Take 1 tablet (25 mg) by mouth daily   ACCU-CHEK ULISES PLUS test strip    cholecalciferol (VITAMIN D3) 5000 UNITS TABS tablet Take by mouth daily   thiamine 100 MG tablet Take 100 mg by mouth   GLOBAL EASE INJECT PEN NEEDLES 31G X 5 MM      No current facility-administered medications on file prior to visit.   Allergies   Allergen Reactions     Asa [Aspirin] Other (See Comments)     dizzy     Morphine Itching     Itching     Past Medical History:   Diagnosis Date     Aneurysm of internal carotid artery      Arthritis      Chronic pain syndrome     Right Shouler/ Has surgery     DDD (degenerative disc disease), lumbar      Diabetes mellitus (H)     Taking medication     Facet arthropathy (H)      Gastro-oesophageal reflux disease     Taking medication     Hypertension     Taking medication     Intermittent asthma, uncontrolled     Using inhaler     Morbid obesity due to excess calories (H)      Urinary hesitancy      Past Surgical  "History:   Procedure Laterality Date     ARTHROSCOPY SHOULDER RT/LT Right      JOINT REPLACEMTN, KNEE RT/LT Bilateral 2013  ,2012    Joint Replacement knee RT/LT     Social History   Substance Use Topics     Smoking status: Never Smoker     Smokeless tobacco: Never Used     Alcohol use No         GENERAL EXAMINATION:    General appearance: Pleasant male sitting comfortably in a chair  /77 (BP Location: Right arm, Patient Position: Chair, Cuff Size: Adult Large)  Pulse 69  Ht 1.765 m (5' 9.5\")  Wt 115.3 kg (254 lb 3.2 oz)  BMI 37 kg/m2    NEUROLOGICAL EXAMINATION:    Head & Neck:  Neck supple  No carotid bruit  Mental Status:    Alert and oriented to time, place and person    Recent and remote memory intact    Attention span and concentration normal    Adequate fund of knowledge  Speech: Normal  Cranial Nerves:  Cranial Nerve 2:    Visual acuity normal to finger counting    Pupils equal and reacting to light    No field defect by confrontation    Fundus reveals normal disc margins      Cranial Nerves 3, 4 and 6:    Eye movements normal in all directions of gaze    Cranial Nerve 5:     Normal facial sensory and motor functions    Cranial Nerve 7:     Symmetrical face without motor weakness     Cranial Nerve 8:    Normal hearing to whispered sounds    Cranial Nerves 9, 10:    Normal palate and uvula movements    Cranial Nerve 11:    Shoulder shrug symmetrical    Cranial Nerve 12:    Tongue midline with normal movements    Motor:    Tone and bulk: Normal in both upper and lower limbs    Power: No drift of the outstretched arms          Normal strength in all muscle groups of both upper and lower limbs     Coordination:    Finger nose test and rapid alternate movements normal bilaterally    Heel-shin test normal bilaterally    Deep Tendon Reflexes:    Upper limbs: Equal and symmetrical    Lower limbs: Equal and symmetrical with intact ankle jerks and down going plantars      Sensations:    Touch/Pin prick: " Normal at both and upper and lower limbs    Vibration (128 Hz): Diminished at both ankles    Position sense: Normal at both big toes    Gait:    Walks with a normal stride length and a normal arm swing    Can stand on heels and toes    Can walk on heels and toes    Normal tandem walking    Romberg Sign: Negative  IMPRESSION:  Encounter Diagnoses   Name Primary?     Aneurysm of cavernous portion of left internal carotid artery Yes     COMMENTS: Small intracranial internal carotid artery aneurysm. Reasonable to seek the help of [facility] neurosurgeon Dr. Malhotra.    PLANS:  Patient Instructions     AFTER VISIT SUMMARY (AVS)  Orders Placed This Encounter   Procedures     NEUROSURGERY REFERRAL     Diagnostic possibilities reviewed     Preventive Neurology: Encouraged to keep physically and mentally active with particular emphasis on daily stretching exercises, walking, and healthy eating.    To call us for follow-up appointment after the work-up    Thanks to Dr. Deborah Bello for allowing me to participate in Mr. Dos Santos's care.  Please feel free  call me with any questions or concerns.       Lita Alaniz MD, [facility]  Neurologist    cc: Dr. Cabrera  Anuj

## 2017-05-31 NOTE — MR AVS SNAPSHOT
After Visit Summary   5/31/2017    Sandra Dos Santos    MRN: [id number]           Patient Information     Date Of Birth          1955        Visit Information        Provider Department      5/31/2017 2:30 PM Lita Alaniz MD; MULTILINGUAL [facility] [place]        Today's Diagnoses     Aneurysm of cavernous portion of left internal carotid artery    -  1      Care Instructions    AFTER VISIT SUMMARY (AVS)  Orders Placed This Encounter   Procedures     NEUROSURGERY REFERRAL     Diagnostic possibilities reviewed     Preventive Neurology: Encouraged to keep physically and mentally active with particular emphasis on daily stretching exercises, walking, and healthy eating.    To call us for follow-up appointment after the work-up    Thanks                    Follow-ups after your visit        Additional Services     NEUROSURGERY REFERRAL       Referral to Jamal Malhotra MD Neurosurgeon, [facility], Tel. [phone]      Reason: Brain Aneurysm    Clinical: Presented with acute verigo beginning of May, 2017. Went to [facility]. Found to have Left ICA artery aneurysm (Cavernous portion)    Would appreciate your help with the diagnosis and further management.    Please send your consultation note to No Ref-Primary, Physician also.    Thanks  Lita Alaniz MD, [facility]  Neurologist      [facility] referral to [facility] Physicians: Adult Neurosurgery at [phone].Coverage of these services is subject to the terms and limitations of your health insurance plan.    Please call  member services at your health plan with any benefit or coverage questions.   Any CT, MRI or procedures ordered by your specialist must be performed at a [facility] facility OR coordinated by your clinic referral office.  If X-rays, CTs or MRIs have been performed, please contact the facility where they were done, to arrange for  prior to your scheduled appointment.  Please bring this referral  "request to your appointment and present it to your specialist.                  Follow-up notes from your care team     Return in about 3 months (around 8/31/2017).      Your next 10 appointments already scheduled     Jul 14, 2017  2:00 PM CDT   Return Visit with Simno Andrade MD   [facility] [place] ([facility] [place])    [address]  [place] [address]   [phone]            Aug 25, 2017  2:15 PM CDT   Return Visit with Blanca Beyer MD   [facility] [place] ([facility])    [address]  [place] [address] [phone]              Who to contact     If you have questions or need follow up information about today's clinic visit or your schedule please contact [facility] directly at [phone].  Normal or non-critical lab and imaging results will be communicated to you by MyChart, letter or phone within 4 business days after the clinic has received the results. If you do not hear from us within 7 days, please contact the clinic through MyChart or phone. If you have a critical or abnormal lab result, we will notify you by phone as soon as possible.  Submit refill requests through [employer] or call your pharmacy and they will forward the refill request to us. Please allow 3 business days for your refill to be completed.          Additional Information About Your Visit        MyChart Information     [employer] lets you send messages to your doctor, view your test results, renew your prescriptions, schedule appointments and more. To sign up, go to [email or web address] . Click on \"Log in\" on the left side of the screen, which will take you to the Welcome page. Then click on \"Sign up Now\" on the right side of the page.     You will be asked to enter the access code listed below, as well as some personal information. Please follow the directions to create your username and password.     Your access code is: " "[email or web address]  Expires: 2017  2:49 PM     Your access code will  in 90 days. If you need help or a new code, please call your [facility] clinic or [phone].        Care EveryWhere ID     This is your Care EveryWhere ID. This could be used by other organizations to access your [facility] medical records  [id number]        Your Vitals Were     Pulse Height BMI (Body Mass Index)             69 1.765 m (5' 9.5\") 37 kg/m2          Blood Pressure from Last 3 Encounters:   17 125/77   17 130/82   17 134/86    Weight from Last 3 Encounters:   17 115.3 kg (254 lb 3.2 oz)   17 114.3 kg (252 lb)   17 112.5 kg (248 lb)              We Performed the Following     NEUROSURGERY REFERRAL          Today's Medication Changes          These changes are accurate as of: 17  4:08 PM.  If you have any questions, ask your nurse or doctor.               These medicines have changed or have updated prescriptions.        Dose/Directions    insulin glargine U-300 300 UNIT/ML injection   Commonly known as:  TOUJEO SOLOSTAR   This may have changed:  how much to take   Used for:  Type 2 diabetes mellitus with stage 3 chronic kidney disease, with long-term current use of insulin (H)        Dose:  14 Units   Inject 14 Units Subcutaneous At Bedtime   Quantity:  6 mL   Refills:  3                Primary Care Provider    Physician No Ref-Primary       No address on file        Thank you!     Thank you for choosing [facility]  for your care. Our goal is always to provide you with excellent care. Hearing back from our patients is one way we can continue to improve our services. Please take a few minutes to complete the written survey that you may receive in the mail after your visit with us. Thank you!             Your Updated Medication List - Protect others around you: Learn how to safely use, store and throw away your medicines at www.disposemymeds.org.          This list is accurate as " of: 5/31/17  4:08 PM.  Always use your most recent med list.                   Brand Name Dispense Instructions for use    ACCU-CHEK ULISES PLUS test strip   Generic drug:  blood glucose monitoring          acetaminophen 500 MG tablet    TYLENOL     Take 500-1,000 mg by mouth every 6 hours as needed for mild pain       * albuterol (2.5 MG/3ML) 0.083% neb solution     25 vial    Take 1 vial (2.5 mg) by nebulization every 4 hours as needed for shortness of breath / dyspnea or wheezing       * albuterol 108 (90 BASE) MCG/ACT Inhaler    PROAIR HFA/PROVENTIL HFA/VENTOLIN HFA    1 Inhaler    Inhale 2 puffs into the lungs every 4 hours as needed for shortness of breath / dyspnea or wheezing       atorvastatin 10 MG tablet    LIPITOR    90 tablet    Take 1 tablet (10 mg) by mouth daily       cholecalciferol 5000 UNITS Tabs tablet    vitamin D3     Take by mouth daily       glimepiride 1 MG tablet    AMARYL    180 tablet    TAKE 3 TABLETS (3 MG) BY MOUTH 2 TIMES DAILY (WITH MEALS)       GLOBAL EASE INJECT PEN NEEDLES 31G X 5 MM   Generic drug:  insulin pen needle          insulin glargine U-300 300 UNIT/ML injection    TOUJEO SOLOSTAR    6 mL    Inject 14 Units Subcutaneous At Bedtime       INVOKANA 100 MG tablet   Generic drug:  canagliflozin     90 tablet    Take 1 tablet (100 mg) by mouth every morning (before breakfast)       lidocaine-prilocaine cream    EMLA    5800 g    Apply topically as needed for moderate pain       losartan 25 MG tablet    COZAAR    90 tablet    Take 1 tablet (25 mg) by mouth daily       * order for DME     400 each    Test strips for pt's glucometer, brand as covered by insurance. Test four times daily and prn.       * order for DME     400 each    Test strips for pt's glucometer, brand as covered by insurance. Test four times daily and prn.       * order for DME     400 each    Lancets.  Four times daily and prn.       pregabalin 150 MG capsule    LYRICA    60 capsule    Take 1 capsule (150 mg)  by mouth 2 times daily       ranitidine 150 MG tablet    ZANTAC    180 tablet    Take 1 tablet (150 mg) by mouth 2 times daily       tamsulosin 0.4 MG capsule    FLOMAX    90 capsule    Take 1 capsule (0.4 mg) by mouth daily       thiamine 100 MG tablet      Take 100 mg by mouth       traMADol 50 MG tablet    ULTRAM    120 tablet    Take 1-2 tablets ( mg) by mouth every 6 hours as needed for pain maximum 4 tablet(s) per day       traZODone 50 MG tablet    DESYREL    25 tablet    TAKE ONE-HALF TO ONE TABLET EACH NIGHT AT BEDTIME       * Notice:  This list has 5 medication(s) that are the same as other medications prescribed for you. Read the directions carefully, and ask your doctor or other care provider to review them with you.

## 2017-05-31 NOTE — NURSING NOTE
"Chief Complaint   Patient presents with     Neurologic Problem     Aneurysm of internal carotid artery. Patient seen at [facility]       Initial /77 (BP Location: Right arm, Patient Position: Chair, Cuff Size: Adult Large)  Pulse 69  Ht 5' 9.5\" (1.765 m)  Wt 254 lb 3.2 oz (115.3 kg)  BMI 37 kg/m2 Estimated body mass index is 37 kg/(m^2) as calculated from the following:    Height as of this encounter: 5' 9.5\" (1.765 m).    Weight as of this encounter: 254 lb 3.2 oz (115.3 kg).  Medication Reconciliation: complete   Yanira Ascencio MA      "

## 2017-05-31 NOTE — NURSING NOTE
Hospital ER visit /In-patient: Yes: [facility].  Aneurysm of internal carotid artery  Previous Consults: None

## 2017-05-31 NOTE — PATIENT INSTRUCTIONS
AFTER VISIT SUMMARY (AVS)  Orders Placed This Encounter   Procedures     NEUROSURGERY REFERRAL     Diagnostic possibilities reviewed     Preventive Neurology: Encouraged to keep physically and mentally active with particular emphasis on daily stretching exercises, walking, and healthy eating.    To call us for follow-up appointment after the work-up    Thanks

## 2017-06-11 ENCOUNTER — MEDICAL CORRESPONDENCE (OUTPATIENT)
Dept: HEALTH INFORMATION MANAGEMENT | Facility: CLINIC | Age: 62
End: 2017-06-11

## 2017-06-13 ENCOUNTER — TRANSFERRED RECORDS (OUTPATIENT)
Dept: HEALTH INFORMATION MANAGEMENT | Facility: CLINIC | Age: 62
End: 2017-06-13

## 2017-06-30 ENCOUNTER — OFFICE VISIT (OUTPATIENT)
Dept: FAMILY MEDICINE | Facility: CLINIC | Age: 62
End: 2017-06-30
Payer: MEDICARE

## 2017-06-30 VITALS
RESPIRATION RATE: 20 BRPM | DIASTOLIC BLOOD PRESSURE: 64 MMHG | WEIGHT: 248 LBS | HEIGHT: 70 IN | BODY MASS INDEX: 35.5 KG/M2 | TEMPERATURE: 98 F | OXYGEN SATURATION: 98 % | HEART RATE: 85 BPM | SYSTOLIC BLOOD PRESSURE: 120 MMHG

## 2017-06-30 DIAGNOSIS — Z79.4 TYPE 2 DIABETES MELLITUS WITH STAGE 3 CHRONIC KIDNEY DISEASE, WITH LONG-TERM CURRENT USE OF INSULIN (H): ICD-10-CM

## 2017-06-30 DIAGNOSIS — J06.9 UPPER RESPIRATORY TRACT INFECTION, UNSPECIFIED TYPE: Primary | ICD-10-CM

## 2017-06-30 DIAGNOSIS — N18.30 TYPE 2 DIABETES MELLITUS WITH STAGE 3 CHRONIC KIDNEY DISEASE, WITH LONG-TERM CURRENT USE OF INSULIN (H): ICD-10-CM

## 2017-06-30 DIAGNOSIS — E11.22 TYPE 2 DIABETES MELLITUS WITH STAGE 3 CHRONIC KIDNEY DISEASE, WITH LONG-TERM CURRENT USE OF INSULIN (H): ICD-10-CM

## 2017-06-30 PROCEDURE — 99213 OFFICE O/P EST LOW 20 MIN: CPT | Performed by: FAMILY MEDICINE

## 2017-06-30 RX ORDER — BENZONATATE 200 MG/1
200 CAPSULE ORAL 3 TIMES DAILY PRN
Qty: 21 CAPSULE | Refills: 0 | Status: SHIPPED | OUTPATIENT
Start: 2017-06-30 | End: 2017-08-01

## 2017-06-30 NOTE — PROGRESS NOTES
SUBJECTIVE:                                                    Sandra Dos Santos is a 62 year old male who presents to clinic today for the following health issues:    RESPIRATORY SYMPTOMS      Duration: x 1 weeks     Description  nasal congestion,, cough, fatigue/malaise and myalgias    Severity: moderate    Accompanying signs and symptoms: None    History (predisposing factors):  none    Precipitating or alleviating factors: tylenol, inhaler    Therapies tried and outcome:  none          Problem list and histories reviewed & adjusted, as indicated.  Additional history: as documented    Patient Active Problem List   Diagnosis     HYPERLIPIDEMIA LDL GOAL <100     Chronic kidney disease, stage III (moderate)     Gastroesophageal reflux disease     Hypothyroidism     Urinary hesitancy     Type 2 diabetes mellitus with stage 3 chronic kidney disease, with long-term current use of insulin (H)     Chronic pain syndrome     Morbid obesity due to excess calories (H)     Intermittent asthma, uncomplicated     Benign prostatic hyperplasia, presence of lower urinary tract symptoms unspecified, unspecified morphology     Gastroesophageal reflux disease without esophagitis     Hypertension goal BP (blood pressure) < 140/90     Lumbar stenosis     DDD (degenerative disc disease), lumbar     Osteoarthritis of lumbar spine, unspecified spinal osteoarthritis complication status     Facet arthropathy     Osteoarthritis of spine with radiculopathy, lumbosacral region     Other spondylosis, lumbosacral region     Aneurysm of internal carotid artery     Past Surgical History:   Procedure Laterality Date     ARTHROSCOPY SHOULDER RT/LT Right      JOINT REPLACEMTN, KNEE RT/LT Bilateral 2013  ,2012    Joint Replacement knee RT/LT       Social History   Substance Use Topics     Smoking status: Never Smoker     Smokeless tobacco: Never Used     Alcohol use No     Family History   Problem Relation Age of Onset     DIABETES Father      Prostate  Cancer No family hx of      Colon Cancer No family hx of      Breast Cancer No family hx of          Current Outpatient Prescriptions   Medication Sig Dispense Refill     benzonatate (TESSALON) 200 MG capsule Take 1 capsule (200 mg) by mouth 3 times daily as needed for cough 21 capsule 0     acetaminophen (TYLENOL) 500 MG tablet Take 500-1,000 mg by mouth every 6 hours as needed for mild pain       glimepiride (AMARYL) 1 MG tablet TAKE 3 TABLETS (3 MG) BY MOUTH 2 TIMES DAILY (WITH MEALS) 180 tablet 1     traZODone (DESYREL) 50 MG tablet TAKE ONE-HALF TO ONE TABLET EACH NIGHT AT BEDTIME 25 tablet 2     atorvastatin (LIPITOR) 10 MG tablet Take 1 tablet (10 mg) by mouth daily 90 tablet 2     INVOKANA 100 MG tablet Take 1 tablet (100 mg) by mouth every morning (before breakfast) 90 tablet 0     albuterol (2.5 MG/3ML) 0.083% neb solution Take 1 vial (2.5 mg) by nebulization every 4 hours as needed for shortness of breath / dyspnea or wheezing 25 vial 1     insulin glargine U-300 (TOUJEO SOLOSTAR) 300 UNIT/ML injection Inject 14 Units Subcutaneous At Bedtime (Patient taking differently: Inject 12 Units Subcutaneous At Bedtime ) 6 mL 3     tamsulosin (FLOMAX) 0.4 MG capsule Take 1 capsule (0.4 mg) by mouth daily 90 capsule 3     ranitidine (ZANTAC) 150 MG tablet Take 1 tablet (150 mg) by mouth 2 times daily 180 tablet 3     order for DME Test strips for pt's glucometer, brand as covered by insurance. Test four times daily and prn. 400 each 4     order for DME Lancets.  Four times daily and prn. 400 each 4     albuterol (PROAIR HFA/PROVENTIL HFA/VENTOLIN HFA) 108 (90 BASE) MCG/ACT Inhaler Inhale 2 puffs into the lungs every 4 hours as needed for shortness of breath / dyspnea or wheezing 1 Inhaler 11     pregabalin (LYRICA) 150 MG capsule Take 1 capsule (150 mg) by mouth 2 times daily 60 capsule 1     traMADol (ULTRAM) 50 MG tablet Take 1-2 tablets ( mg) by mouth every 6 hours as needed for pain maximum 4 tablet(s) per  day 120 tablet 1     lidocaine-prilocaine (EMLA) cream Apply topically as needed for moderate pain 5800 g 1     order for DME Test strips for pt's glucometer, brand as covered by insurance. Test four times daily and prn. 400 each 4     losartan (COZAAR) 25 MG tablet Take 1 tablet (25 mg) by mouth daily 90 tablet 3     GLOBAL EASE INJECT PEN NEEDLES 31G X 5 MM   3     ACCU-CHEK ULISES PLUS test strip   2     cholecalciferol (VITAMIN D3) 5000 UNITS TABS tablet Take by mouth daily       thiamine 100 MG tablet Take 100 mg by mouth       Allergies   Allergen Reactions     Asa [Aspirin] Other (See Comments)     dizzy     Morphine Itching     Itching     Recent Labs   Lab Test  05/16/17   1143  03/08/17   1251  01/04/17   0757  11/29/16   1101  10/15/12   1108  09/15/10   1232   A1C  9.0*  8.7*  7.8*  8.1*   --   7.3*   LDL   --    --   73  122*   --   77   HDL   --    --   50  45   --   51   TRIG   --    --   197*  226*   --   167*   ALT   --    --    --   26  18   --    CR   --    --    --   1.22  0.80   --    GFRESTIMATED   --    --    --   60*  >90   --    GFRESTBLACK   --    --    --   73  >90   --    POTASSIUM   --    --    --   4.6  4.2   --    TSH   --    --    --   2.58  1.16   --       BP Readings from Last 3 Encounters:   06/30/17 120/64   05/31/17 125/77   05/30/17 130/82    Wt Readings from Last 3 Encounters:   06/30/17 248 lb (112.5 kg)   05/31/17 254 lb 3.2 oz (115.3 kg)   05/16/17 252 lb (114.3 kg)                  Labs reviewed in EPIC    Reviewed and updated as needed this visit by clinical staff  Tobacco  Allergies  Meds  Med Hx  Surg Hx  Fam Hx  Soc Hx      Reviewed and updated as needed this visit by Provider         ROS:  C: NEGATIVE for fever, chills, change in weight  INTEGUMENTARY/SKIN: NEGATIVE for worrisome rashes, moles or lesions  ENT/MOUTH: as above  RESP:cough nonprodutive ,no sob  CV: NEGATIVE for chest pain, palpitations or peripheral edema  GI: NEGATIVE for nausea, abdominal pain,  "heartburn, or change in bowel habits    OBJECTIVE:     /64 (BP Location: Left arm, Patient Position: Chair, Cuff Size: Adult Regular)  Pulse 85  Temp 98  F (36.7  C) (Oral)  Resp 20  Ht 5' 9.5\" (1.765 m)  Wt 248 lb (112.5 kg)  SpO2 98%  BMI 36.1 kg/m2  Body mass index is 36.1 kg/(m^2).  GENERAL: healthy, alert and no distress  EYES: Eyes grossly normal to inspection, PERRL and conjunctivae and sclerae normal  HENT: ear canals and TM's normal, nose congestion and mouth without ulcers or lesions  NECK: no adenopathy, no asymmetry, masses, or scars and thyroid normal to palpation  RESP: lungs clear to auscultation - no rales, rhonchi or wheezes  CV: regular rate and rhythm, normal S1 S2, no S3 or S4, no murmur, click or rub, no peripheral edema and peripheral pulses strong  ABDOMEN: soft, nontender, no hepatosplenomegaly, no masses and bowel sounds normal  MS: no gross musculoskeletal defects noted, no edema    Diagnostic Test Results:  none     ASSESSMENT/PLAN:         BMI:   Estimated body mass index is 36.1 kg/(m^2) as calculated from the following:    Height as of this encounter: 5' 9.5\" (1.765 m).    Weight as of this encounter: 248 lb (112.5 kg).   Weight management plan: low abigail diet      1. Upper respiratory tract infection, unspecified type  Advised symptomatic Treatment  Rest/fluids  Follow up 1 week if not better/sooner if worse    - benzonatate (TESSALON) 200 MG capsule; Take 1 capsule (200 mg) by mouth 3 times daily as needed for cough  Dispense: 21 capsule; Refill: 0    2. Type 2 diabetes mellitus with stage 3 chronic kidney disease, with long-term current use of insulin (H)  Advised that he check accuchecks daily  Send Readings to [facility] in 10 days  See me 3 weeks    Diabetic diet  His Insulin was recently increased  His accuchecks are 130-150 average-But it was Ramadan and he was fasting  [facility] [place]    "

## 2017-06-30 NOTE — MR AVS SNAPSHOT
After Visit Summary   6/30/2017    Sandra Dos Santos    MRN: [id number]           Patient Information     Date Of Birth          1955        Visit Information        Provider Department      6/30/2017 2:15 PM Open, Assignments; Deborah Leslie MD [facility]        Today's Diagnoses     Upper respiratory tract infection, unspecified type    -  1      Care Instructions    [facility]    If you have any questions regarding to your visit please contact your care team:       Team Red:   Clinic Hours Telephone Number   Dr. Twyla Hutson, NP   7am-7pm  Monday - Thursday   7am-5pm  Fridays  [phone]  (Appointment scheduling available 24/7)    Questions about your visit?   Team Line  [phone]   [facility] [place] [facility] 11am-9pm Monday-Friday Saturday-Sunday- 9am-5pm   [place] - 5pm-9pm Monday-Friday Saturday-Sunday- 9am-5pm  [phone] - [facility]  [phone] - [place]       What options do I have for visits at the clinic other than the traditional office visit?  To expand how we care for you, many of our providers are utilizing electronic visits (e-visits) and telephone visits, when medically appropriate, for interactions with their patients rather than a visit in the clinic.   We also offer nurse visits for many medical concerns. Just like any other service, we will bill your insurance company for this type of visit based on time spent on the phone with your provider. Not all insurance companies cover these visits. Please check with your medical insurance if this type of visit is covered. You will be responsible for any charges that are not paid by your insurance.      E-visits via [employer]:  generally incur a $35.00 fee.  Telephone visits:  Time spent on the phone: *charged based on time that is spent on the phone in increments of 10 minutes. Estimated cost:   5-10 mins $30.00   11-20  "mins. $59.00   21-30 mins. $85.00     Use [employer] (secure email communication and access to your chart) to send your primary care provider a message or make an appointment. Ask someone on your Team how to sign up for [employer].  For a Price Quote for your services, please call our Consumer Price Line at [phone].      As always, Thank you for trusting us with your health care needs!  Cullen Madrid            Follow-ups after your visit        Your next 10 appointments already scheduled     Jul 14, 2017  1:45 PM CDT   Return Visit with Simon Andrade MD   [facility] ([facility])    [address]  [place] [address] [phone]            Aug 25, 2017  2:15 PM CDT   [facility]   [facility]    [address] [place] [address] [phone]              Who to contact     If you have questions or need follow up information about today's clinic visit or your schedule please contact [facility] directly at [phone].  Normal or non-critical lab and imaging results will be communicated to you by MyChart, letter or phone within 4 business days after the clinic has received the results. If you do not hear from us within 7 days, please contact the clinic through MyChart or phone. If you have a critical or abnormal lab result, we will notify you by phone as soon as possible.  Submit refill requests through [employer] or call your pharmacy and they will forward the refill request to us. Please allow 3 business days for your refill to be completed.          Additional Information About Your Visit        [facility] Bud[employer] Information     [employer] lets you send messages to your doctor, view your test results, renew your prescriptions, schedule appointments and more. To sign up, go to [email or web address] . Click on \"Log in\" on the left side of the screen, which will take you to " "the Welcome page. Then click on \"Sign up Now\" on the right side of the page.     You will be asked to enter the access code listed below, as well as some personal information. Please follow the directions to create your username and password.     Your access code is: [email or web address]  Expires: 2017  6:30 AM     Your access code will  in 90 days. If you need help or a new code, please call your [place] clinic or [phone].        Care EveryWhere ID     This is your Care EveryWhere ID. This could be used by other organizations to access your [place] medical records  [id number]        Your Vitals Were     Pulse Temperature Respirations Height Pulse Oximetry BMI (Body Mass Index)    85 98  F (36.7  C) (Oral) 20 5' 9.5\" (1.765 m) 98% 36.1 kg/m2       Blood Pressure from Last 3 Encounters:   17 120/64   17 125/77   17 130/82    Weight from Last 3 Encounters:   17 248 lb (112.5 kg)   17 254 lb 3.2 oz (115.3 kg)   17 252 lb (114.3 kg)              Today, you had the following     No orders found for display         Today's Medication Changes          These changes are accurate as of: 17  2:57 PM.  If you have any questions, ask your nurse or doctor.               Start taking these medicines.        Dose/Directions    benzonatate 200 MG capsule   Commonly known as:  TESSALON   Used for:  Upper respiratory tract infection, unspecified type   Started by:  Deborah Leslie MD        Dose:  200 mg   Take 1 capsule (200 mg) by mouth 3 times daily as needed for cough   Quantity:  21 capsule   Refills:  0         These medicines have changed or have updated prescriptions.        Dose/Directions    insulin glargine U-300 300 UNIT/ML injection   Commonly known as:  TOUJEO SOLOSTAR   This may have changed:  how much to take   Used for:  Type 2 diabetes mellitus with stage 3 chronic kidney disease, with long-term current use of insulin (H)        Dose:  14 Units   Inject 14 Units " Subcutaneous At Bedtime   Quantity:  6 mL   Refills:  3            Where to get your medicines      These medications were sent to [facility] - [place], [place] - [address]  [address] [place] [address]     Phone:  [phone]     benzonatate 200 MG capsule                Primary Care Provider    Physician No Ref-Primary       No address on file        Equal Access to Services     [facility]: Hadii aad ku hadasho Soomaali, waaxda luqadaha, qaybta kaalmada adeegyada, waxay idiin hayaan adeeg kharash lacody . So [facility] [phone].    ATENCIÓN: Si habla español, tiene a fletcher disposición servicios gratuitos de asistencia lingüística. Radha al [phone].    We comply with applicable federal civil rights laws and [place] laws. We do not discriminate on the basis of race, color, national origin, age, disability sex, sexual orientation or gender identity.            Thank you!     Thank you for choosing [facility]  for your care. Our goal is always to provide you with excellent care. Hearing back from our patients is one way we can continue to improve our services. Please take a few minutes to complete the written survey that you may receive in the mail after your visit with us. Thank you!             Your Updated Medication List - Protect others around you: Learn how to safely use, store and throw away your medicines at www.disposemymeds.org.          This list is accurate as of: 6/30/17  2:57 PM.  Always use your most recent med list.                   Brand Name Dispense Instructions for use Diagnosis    ACCU-CHEK ULISES PLUS test strip   Generic drug:  blood glucose monitoring           acetaminophen 500 MG tablet    TYLENOL     Take 500-1,000 mg by mouth every 6 hours as needed for mild pain        * albuterol (2.5 MG/3ML) 0.083% neb solution     25 vial    Take 1 vial (2.5 mg) by nebulization every 4 hours as needed for shortness of breath / dyspnea or wheezing        *  albuterol 108 (90 BASE) MCG/ACT Inhaler    PROAIR HFA/PROVENTIL HFA/VENTOLIN HFA    1 Inhaler    Inhale 2 puffs into the lungs every 4 hours as needed for shortness of breath / dyspnea or wheezing    Intermittent asthma, uncomplicated       atorvastatin 10 MG tablet    LIPITOR    90 tablet    Take 1 tablet (10 mg) by mouth daily    Hyperlipidemia LDL goal <100       benzonatate 200 MG capsule    TESSALON    21 capsule    Take 1 capsule (200 mg) by mouth 3 times daily as needed for cough    Upper respiratory tract infection, unspecified type       cholecalciferol 5000 UNITS Tabs tablet    vitamin D3     Take by mouth daily        glimepiride 1 MG tablet    AMARYL    180 tablet    TAKE 3 TABLETS (3 MG) BY MOUTH 2 TIMES DAILY (WITH MEALS)    Type 2 diabetes mellitus with stage 3 chronic kidney disease, with long-term current use of insulin (H)       GLOBAL EASE INJECT PEN NEEDLES 31G X 5 MM   Generic drug:  insulin pen needle           insulin glargine U-300 300 UNIT/ML injection    TOUJEO SOLOSTAR    6 mL    Inject 14 Units Subcutaneous At Bedtime    Type 2 diabetes mellitus with stage 3 chronic kidney disease, with long-term current use of insulin (H)       INVOKANA 100 MG tablet   Generic drug:  canagliflozin     90 tablet    Take 1 tablet (100 mg) by mouth every morning (before breakfast)    Type 2 diabetes mellitus with stage 3 chronic kidney disease, with long-term current use of insulin (H)       lidocaine-prilocaine cream    EMLA    5800 g    Apply topically as needed for moderate pain        losartan 25 MG tablet    COZAAR    90 tablet    Take 1 tablet (25 mg) by mouth daily    Hypertension goal BP (blood pressure) < 140/90       * order for DME     400 each    Test strips for pt's glucometer, brand as covered by insurance. Test four times daily and prn.    Type 2 diabetes mellitus with stage 3 chronic kidney disease, with long-term current use of insulin (H)       * order for DME     400 each    Test strips  for pt's glucometer, brand as covered by insurance. Test four times daily and prn.    Type 2 diabetes mellitus with stage 3 chronic kidney disease, with long-term current use of insulin (H)       * order for DME     400 each    Lancets.  Four times daily and prn.    Type 2 diabetes mellitus with stage 3 chronic kidney disease, with long-term current use of insulin (H)       pregabalin 150 MG capsule    LYRICA    60 capsule    Take 1 capsule (150 mg) by mouth 2 times daily    Chronic pain syndrome       ranitidine 150 MG tablet    ZANTAC    180 tablet    Take 1 tablet (150 mg) by mouth 2 times daily    Gastroesophageal reflux disease without esophagitis       tamsulosin 0.4 MG capsule    FLOMAX    90 capsule    Take 1 capsule (0.4 mg) by mouth daily    Benign prostatic hyperplasia, presence of lower urinary tract symptoms unspecified, unspecified morphology       thiamine 100 MG tablet      Take 100 mg by mouth        traMADol 50 MG tablet    ULTRAM    120 tablet    Take 1-2 tablets ( mg) by mouth every 6 hours as needed for pain maximum 4 tablet(s) per day    Chronic pain syndrome       traZODone 50 MG tablet    DESYREL    25 tablet    TAKE ONE-HALF TO ONE TABLET EACH NIGHT AT BEDTIME    Insomnia, unspecified type       * Notice:  This list has 5 medication(s) that are the same as other medications prescribed for you. Read the directions carefully, and ask your doctor or other care provider to review them with you.

## 2017-06-30 NOTE — PATIENT INSTRUCTIONS
[facility]    If you have any questions regarding to your visit please contact your care team:       Team Red:   Clinic Hours Telephone Number   Dr. Twyla Hutson, NP   7am-7pm  Monday - Thursday   7am-5pm  Fridays  [phone]  (Appointment scheduling available 24/7)    Questions about your visit?   Team Line  [phone]   Urgent Care - [place] - 11am-9pm Monday-Friday Saturday-Sunday- 9am-5pm   [place] - 5pm-9pm Monday-Friday Saturday-Sunday- 9am-5pm  [phone] - [place]   [phone] - [place]       What options do I have for visits at the clinic other than the traditional office visit?  To expand how we care for you, many of our providers are utilizing electronic visits (e-visits) and telephone visits, when medically appropriate, for interactions with their patients rather than a visit in the clinic.   We also offer nurse visits for many medical concerns. Just like any other service, we will bill your insurance company for this type of visit based on time spent on the phone with your provider. Not all insurance companies cover these visits. Please check with your medical insurance if this type of visit is covered. You will be responsible for any charges that are not paid by your insurance.      E-visits via [employer]:  generally incur a $35.00 fee.  Telephone visits:  Time spent on the phone: *charged based on time that is spent on the phone in increments of 10 minutes. Estimated cost:   5-10 mins $30.00   11-20 mins. $59.00   21-30 mins. $85.00     Use [employer] (secure email communication and access to your chart) to send your primary care provider a message or make an appointment. Ask someone on your Team how to sign up for [email or web address].  For a Price Quote for your services, please call our Consumer Price Line at [phone].      As always, Thank you for trusting us with your health care needs!  Cullen HERNANDEZ  [place]

## 2017-06-30 NOTE — NURSING NOTE
"Chief Complaint   Patient presents with     URI       Initial /64 (BP Location: Left arm, Patient Position: Chair, Cuff Size: Adult Regular)  Pulse 85  Temp 98  F (36.7  C) (Oral)  Resp 20  Ht 5' 9.5\" (1.765 m)  Wt 248 lb (112.5 kg)  SpO2 98%  BMI 36.1 kg/m2 Estimated body mass index is 36.1 kg/(m^2) as calculated from the following:    Height as of this encounter: 5' 9.5\" (1.765 m).    Weight as of this encounter: 248 lb (112.5 kg).  Medication Reconciliation: complete     Cullen Madrid      "

## 2017-07-03 ENCOUNTER — OFFICE VISIT (OUTPATIENT)
Dept: URGENT CARE | Facility: URGENT CARE | Age: 62
End: 2017-07-03
Payer: MEDICARE

## 2017-07-03 VITALS
WEIGHT: 250 LBS | HEART RATE: 76 BPM | TEMPERATURE: 98.3 F | DIASTOLIC BLOOD PRESSURE: 77 MMHG | SYSTOLIC BLOOD PRESSURE: 124 MMHG | OXYGEN SATURATION: 96 % | BODY MASS INDEX: 36.39 KG/M2

## 2017-07-03 DIAGNOSIS — J20.9 ACUTE BRONCHITIS WITH SYMPTOMS > 10 DAYS: Primary | ICD-10-CM

## 2017-07-03 PROCEDURE — 99213 OFFICE O/P EST LOW 20 MIN: CPT | Performed by: PHYSICIAN ASSISTANT

## 2017-07-03 RX ORDER — CYCLOBENZAPRINE HCL 10 MG
TABLET ORAL
COMMUNITY
Start: 2016-11-28 | End: 2017-07-03

## 2017-07-03 RX ORDER — PREDNISONE 10 MG/1
TABLET ORAL
Refills: 0 | COMMUNITY
Start: 2017-03-22 | End: 2017-07-03

## 2017-07-03 RX ORDER — AZITHROMYCIN 250 MG/1
TABLET, FILM COATED ORAL
Refills: 0 | COMMUNITY
Start: 2017-03-22 | End: 2017-07-03

## 2017-07-03 RX ORDER — AZITHROMYCIN 250 MG/1
TABLET, FILM COATED ORAL
Qty: 6 TABLET | Refills: 0 | Status: SHIPPED | OUTPATIENT
Start: 2017-07-03 | End: 2017-08-01

## 2017-07-03 RX ORDER — BENZONATATE 100 MG/1
CAPSULE ORAL
Refills: 0 | COMMUNITY
Start: 2017-06-30 | End: 2017-08-01

## 2017-07-03 NOTE — MR AVS SNAPSHOT
"              After Visit Summary   7/3/2017    Sandra Dos Santos    MRN: [id number]           Patient Information     Date Of Birth          1955        Visit Information        Provider Department      7/3/2017 6:50 PM Claudette Murguia PA-C [facility]        Today's Diagnoses     Acute bronchitis with symptoms > 10 days    -  1       Follow-ups after your visit        Your next 10 appointments already scheduled     Jul 14, 2017  1:45 PM CDT   Return Visit with Simon Andrade MD   [facility] ([facility])    [address]  [place] [id number] [phone]            Aug 25, 2017  2:15 PM CDT   Return Visit with Blanca Beyer MD   [facility] ([facility])    [address]  [place] [address] [phone]              Who to contact     If you have questions or need follow up information about today's clinic visit or your schedule please contact [facility] directly at [phone].  Normal or non-critical lab and imaging results will be communicated to you by MyChart, letter or phone within 4 business days after the clinic has received the results. If you do not hear from us within 7 days, please contact the clinic through Eligible[employer] or phone. If you have a critical or abnormal lab result, we will notify you by phone as soon as possible.  Submit refill requests through [email or web address] or call your pharmacy and they will forward the refill request to us. Please allow 3 business days for your refill to be completed.          Additional Information About Your Visit        MyChart Information     [email or web address] lets you send messages to your doctor, view your test results, renew your prescriptions, schedule appointments and more. To sign up, go to [email or web address] . Click on \"Log in\" on the left side of the screen, which will take you to the Welcome page. Then click on \"Sign up Now\" on " the right side of the page.     You will be asked to enter the access code listed below, as well as some personal information. Please follow the directions to create your username and password.     Your access code is: [email or web address]  Expires: 2017  6:30 AM     Your access code will  in 90 days. If you need help or a new code, please call your [facility] clinic or [phone].        Care EveryWhere ID     This is your Care EveryWhere ID. This could be used by other organizations to access your [facility] medical records  [id number]        Your Vitals Were     Pulse Temperature Pulse Oximetry BMI (Body Mass Index)          76 98.3  F (36.8  C) (Oral) 96% 36.39 kg/m2         Blood Pressure from Last 3 Encounters:   17 124/77   17 120/64   17 125/77    Weight from Last 3 Encounters:   17 250 lb (113.4 kg)   17 248 lb (112.5 kg)   17 254 lb 3.2 oz (115.3 kg)              Today, you had the following     No orders found for display         Today's Medication Changes          These changes are accurate as of: 7/3/17  7:23 PM.  If you have any questions, ask your nurse or doctor.               Start taking these medicines.        Dose/Directions    azithromycin 250 MG tablet   Commonly known as:  ZITHROMAX Z-FÁTIMA   Used for:  Acute bronchitis with symptoms > 10 days   Started by:  Claudette Murguia PA-C        2 tabs day one then 1 tab qd   Quantity:  6 tablet   Refills:  0         These medicines have changed or have updated prescriptions.        Dose/Directions    insulin glargine U-300 300 UNIT/ML injection   Commonly known as:  TOUJEO SOLOSTAR   This may have changed:  how much to take   Used for:  Type 2 diabetes mellitus with stage 3 chronic kidney disease, with long-term current use of insulin (H)        Dose:  14 Units   Inject 14 Units Subcutaneous At Bedtime   Quantity:  6 mL   Refills:  3            Where to get your medicines      These medications were sent to  [facility] [place] - [address]  [address], [facility] [address]     Phone:  [phone]     azithromycin 250 MG tablet                Primary Care Provider    Physician No Ref-Primary       No address on file        Equal Access to Services     ELISEO RO : Hadii aad ku hadasho Soomaali, waaxda luqadaha, qaybta kaalmada adeegyada, waxay idiin hayashishn adeblayne robertomariza fuchs. So [facility] [phone].    ATENCIÓN: Si habla español, tiene a fletcher disposición servicios gratuitos de asistencia lingüística. Llame al [phone].    We comply with applicable federal civil rights laws and [place] laws. We do not discriminate on the basis of race, color, national origin, age, disability sex, sexual orientation or gender identity.            Thank you!     Thank you for choosing [facility]  for your care. Our goal is always to provide you with excellent care. Hearing back from our patients is one way we can continue to improve our services. Please take a few minutes to complete the written survey that you may receive in the mail after your visit with us. Thank you!             Your Updated Medication List - Protect others around you: Learn how to safely use, store and throw away your medicines at www.disposemymeds.org.          This list is accurate as of: 7/3/17  7:23 PM.  Always use your most recent med list.                   Brand Name Dispense Instructions for use Diagnosis    ACCU-CHEK ULISES PLUS test strip   Generic drug:  blood glucose monitoring           acetaminophen 500 MG tablet    TYLENOL     Take 500-1,000 mg by mouth every 6 hours as needed for mild pain        * albuterol (2.5 MG/3ML) 0.083% neb solution     25 vial    Take 1 vial (2.5 mg) by nebulization every 4 hours as needed for shortness of breath / dyspnea or wheezing        * albuterol 108 (90 BASE) MCG/ACT Inhaler    PROAIR HFA/PROVENTIL HFA/VENTOLIN HFA    1 Inhaler    Inhale 2 puffs  into the lungs every 4 hours as needed for shortness of breath / dyspnea or wheezing    Intermittent asthma, uncomplicated       atorvastatin 10 MG tablet    LIPITOR    90 tablet    Take 1 tablet (10 mg) by mouth daily    Hyperlipidemia LDL goal <100       azithromycin 250 MG tablet    ZITHROMAX Z-FÁTIMA    6 tablet    2 tabs day one then 1 tab qd    Acute bronchitis with symptoms > 10 days       * benzonatate 200 MG capsule    TESSALON    21 capsule    Take 1 capsule (200 mg) by mouth 3 times daily as needed for cough    Upper respiratory tract infection, unspecified type       * benzonatate 100 MG capsule    TESSALON          cholecalciferol 5000 UNITS Tabs tablet    vitamin D3     Take by mouth daily        glimepiride 1 MG tablet    AMARYL    180 tablet    TAKE 3 TABLETS (3 MG) BY MOUTH 2 TIMES DAILY (WITH MEALS)    Type 2 diabetes mellitus with stage 3 chronic kidney disease, with long-term current use of insulin (H)       GLOBAL EASE INJECT PEN NEEDLES 31G X 5 MM   Generic drug:  insulin pen needle           insulin glargine U-300 300 UNIT/ML injection    TOUJEO SOLOSTAR    6 mL    Inject 14 Units Subcutaneous At Bedtime    Type 2 diabetes mellitus with stage 3 chronic kidney disease, with long-term current use of insulin (H)       INVOKANA 100 MG tablet   Generic drug:  canagliflozin     90 tablet    Take 1 tablet (100 mg) by mouth every morning (before breakfast)    Type 2 diabetes mellitus with stage 3 chronic kidney disease, with long-term current use of insulin (H)       lidocaine-prilocaine cream    EMLA    5800 g    Apply topically as needed for moderate pain        losartan 25 MG tablet    COZAAR    90 tablet    Take 1 tablet (25 mg) by mouth daily    Hypertension goal BP (blood pressure) < 140/90       * order for DME     400 each    Test strips for pt's glucometer, brand as covered by insurance. Test four times daily and prn.    Type 2 diabetes mellitus with stage 3 chronic kidney disease, with  long-term current use of insulin (H)       * order for DME     400 each    Test strips for pt's glucometer, brand as covered by insurance. Test four times daily and prn.    Type 2 diabetes mellitus with stage 3 chronic kidney disease, with long-term current use of insulin (H)       * order for DME     400 each    Lancets.  Four times daily and prn.    Type 2 diabetes mellitus with stage 3 chronic kidney disease, with long-term current use of insulin (H)       pregabalin 150 MG capsule    LYRICA    60 capsule    Take 1 capsule (150 mg) by mouth 2 times daily    Chronic pain syndrome       ranitidine 150 MG tablet    ZANTAC    180 tablet    Take 1 tablet (150 mg) by mouth 2 times daily    Gastroesophageal reflux disease without esophagitis       tamsulosin 0.4 MG capsule    FLOMAX    90 capsule    Take 1 capsule (0.4 mg) by mouth daily    Benign prostatic hyperplasia, presence of lower urinary tract symptoms unspecified, unspecified morphology       thiamine 100 MG tablet      Take 100 mg by mouth        traMADol 50 MG tablet    ULTRAM    120 tablet    Take 1-2 tablets ( mg) by mouth every 6 hours as needed for pain maximum 4 tablet(s) per day    Chronic pain syndrome       traZODone 50 MG tablet    DESYREL    25 tablet    TAKE ONE-HALF TO ONE TABLET EACH NIGHT AT BEDTIME    Insomnia, unspecified type       * Notice:  This list has 7 medication(s) that are the same as other medications prescribed for you. Read the directions carefully, and ask your doctor or other care provider to review them with you.

## 2017-07-04 NOTE — NURSING NOTE
"Chief Complaint   Patient presents with     URI     runny nose, cough - richy at night, no fever or chills, achy body,        Initial /77 (BP Location: Left arm, Patient Position: Supine, Cuff Size: Adult Large)  Pulse 76  Temp 98.3  F (36.8  C) (Oral)  Wt 250 lb (113.4 kg)  SpO2 96%  BMI 36.39 kg/m2 Estimated body mass index is 36.39 kg/(m^2) as calculated from the following:    Height as of 6/30/17: 5' 9.5\" (1.765 m).    Weight as of this encounter: 250 lb (113.4 kg).  Medication Reconciliation: complete   Bonnie Rothman MA    "

## 2017-07-04 NOTE — PROGRESS NOTES
SUBJECTIVE:                                                    Sandra Dos Santos is a 62 year old male who presents to clinic today, with his son Rebecca who is serving as an  when needed (an  was offered and they declined), for the following health issues:      RESPIRATORY SYMPTOMS      Duration: For about 1.5 weeks    Description  nasal congestion, rhinorrhea, sore throat, cough, wheezing, headache, fatigue/malaise, hoarse voice, myalgias and conjunctival irritation    Severity: severe    Accompanying signs and symptoms: as indicated above    History (predisposing factors):  Asthma    Precipitating or alleviating factors: None    Therapies tried and outcome:  acetaminophen guaifenesin        Allergies   Allergen Reactions     Asa [Aspirin] Other (See Comments)     dizzy     Morphine Itching     Itching       Past Medical History:   Diagnosis Date     Aneurysm of internal carotid artery      Arthritis      Chronic pain syndrome     Right Shouler/ Has surgery     DDD (degenerative disc disease), lumbar      Diabetes mellitus (H)     Taking medication     Facet arthropathy      Gastro-oesophageal reflux disease     Taking medication     Hypertension     Taking medication     Intermittent asthma, uncontrolled     Using inhaler     Morbid obesity due to excess calories (H)      Urinary hesitancy          Current Outpatient Prescriptions on File Prior to Visit:  benzonatate (TESSALON) 200 MG capsule Take 1 capsule (200 mg) by mouth 3 times daily as needed for cough   acetaminophen (TYLENOL) 500 MG tablet Take 500-1,000 mg by mouth every 6 hours as needed for mild pain   glimepiride (AMARYL) 1 MG tablet TAKE 3 TABLETS (3 MG) BY MOUTH 2 TIMES DAILY (WITH MEALS)   traZODone (DESYREL) 50 MG tablet TAKE ONE-HALF TO ONE TABLET EACH NIGHT AT BEDTIME   atorvastatin (LIPITOR) 10 MG tablet Take 1 tablet (10 mg) by mouth daily   INVOKANA 100 MG tablet Take 1 tablet (100 mg) by mouth every morning (before  breakfast)   albuterol (2.5 MG/3ML) 0.083% neb solution Take 1 vial (2.5 mg) by nebulization every 4 hours as needed for shortness of breath / dyspnea or wheezing   insulin glargine U-300 (TOUJEO SOLOSTAR) 300 UNIT/ML injection Inject 14 Units Subcutaneous At Bedtime (Patient taking differently: Inject 12 Units Subcutaneous At Bedtime )   order for DME Test strips for pt's glucometer, brand as covered by insurance. Test four times daily and prn.   order for DME Lancets.  Four times daily and prn.   albuterol (PROAIR HFA/PROVENTIL HFA/VENTOLIN HFA) 108 (90 BASE) MCG/ACT Inhaler Inhale 2 puffs into the lungs every 4 hours as needed for shortness of breath / dyspnea or wheezing   pregabalin (LYRICA) 150 MG capsule Take 1 capsule (150 mg) by mouth 2 times daily   traMADol (ULTRAM) 50 MG tablet Take 1-2 tablets ( mg) by mouth every 6 hours as needed for pain maximum 4 tablet(s) per day   order for DME Test strips for pt's glucometer, brand as covered by insurance. Test four times daily and prn.   GLOBAL EASE INJECT PEN NEEDLES 31G X 5 MM    ACCU-CHEK ULISES PLUS test strip    cholecalciferol (VITAMIN D3) 5000 UNITS TABS tablet Take by mouth daily   tamsulosin (FLOMAX) 0.4 MG capsule Take 1 capsule (0.4 mg) by mouth daily   ranitidine (ZANTAC) 150 MG tablet Take 1 tablet (150 mg) by mouth 2 times daily (Patient not taking: Reported on 7/3/2017)   lidocaine-prilocaine (EMLA) cream Apply topically as needed for moderate pain (Patient not taking: Reported on 7/3/2017)   losartan (COZAAR) 25 MG tablet Take 1 tablet (25 mg) by mouth daily   thiamine 100 MG tablet Take 100 mg by mouth     No current facility-administered medications on file prior to visit.     Social History   Substance Use Topics     Smoking status: Never Smoker     Smokeless tobacco: Never Used     Alcohol use No       ROS:  Consitutional: As above  ENT: As above  Respiratory: As above    OBJECTIVE:  /77 (BP Location: Left arm, Patient Position:  Supine, Cuff Size: Adult Large)  Pulse 76  Temp 98.3  F (36.8  C) (Oral)  Wt 250 lb (113.4 kg)  SpO2 96%  BMI 36.39 kg/m2  GENERAL APPEARANCE: healthy, alert and no distress  EYES: conjunctiva clear  EARS: No cerumen.   Ear canals w/o erythema, TM's intact w/o erythema.    NOSE/MOUTH: Nose and mouth without ulcers, erythema or lesions  SINUSES: No maxillary sinus tenderness.  THROAT: Mild erythema w/o tonsillar enlargement . No exudates  NECK: supple, nontender, no lymphadenopathy  RESP: lungs clear to auscultation - no rales, rhonchi or wheezes  CV: regular rates and rhythm, normal S1 S2, no murmur noted  NEURO: awake, alert        ASSESSMENT: Well appearing.    ICD-10-CM    1. Acute bronchitis with symptoms > 10 days J20.9 azithromycin (ZITHROMAX Z-FÁTIMA) 250 MG tablet         PLAN:  Lots of rest and fluids.  RTC if any worsening symptoms or if not improving.    Claudette Murguia PA-C

## 2017-07-18 DIAGNOSIS — G89.4 CHRONIC PAIN SYNDROME: ICD-10-CM

## 2017-07-18 NOTE — TELEPHONE ENCOUNTER
Received fax request from [facility] requesting refill(s) for pregabalin (LYRICA) 150 MG capsule    Last refilled on 6/23/17    Pt last seen on 5/4/17  Next appt scheduled for none    Nika Brizuela MA  [facility]      Will facilitate refill.

## 2017-07-19 RX ORDER — PREGABALIN 150 MG/1
150 CAPSULE ORAL 2 TIMES DAILY
Qty: 60 CAPSULE | Refills: 1 | Status: SHIPPED | OUTPATIENT
Start: 2017-07-19 | End: 2017-08-29

## 2017-07-19 NOTE — TELEPHONE ENCOUNTER
Signed Prescriptions:                        Disp   Refills    pregabalin (LYRICA) 150 MG capsule         60 cap*1        Sig: Take 1 capsule (150 mg) by mouth 2 times daily  Authorizing Provider: SIMON SNIDER    Reviewed, printed, and signed in [facility].  Placed in [facility].    Simon ROMERO. Mariana Andrade MD  [facility]

## 2017-07-26 DIAGNOSIS — N18.30 TYPE 2 DIABETES MELLITUS WITH STAGE 3 CHRONIC KIDNEY DISEASE, WITH LONG-TERM CURRENT USE OF INSULIN (H): Primary | ICD-10-CM

## 2017-07-26 DIAGNOSIS — Z79.4 TYPE 2 DIABETES MELLITUS WITH STAGE 3 CHRONIC KIDNEY DISEASE, WITH LONG-TERM CURRENT USE OF INSULIN (H): Primary | ICD-10-CM

## 2017-07-26 DIAGNOSIS — E11.22 TYPE 2 DIABETES MELLITUS WITH STAGE 3 CHRONIC KIDNEY DISEASE, WITH LONG-TERM CURRENT USE OF INSULIN (H): Primary | ICD-10-CM

## 2017-07-26 RX ORDER — LANOLIN ALCOHOL/MO/W.PET/CERES
100 CREAM (GRAM) TOPICAL DAILY
Qty: 100 TABLET | Refills: 3 | Status: SHIPPED | OUTPATIENT
Start: 2017-07-26 | End: 2018-02-21

## 2017-07-26 NOTE — TELEPHONE ENCOUNTER
Vitamin B-1 100mg tab      Last Written Prescription Date:  unknown  Last Fill Quantity: ?,   # refills: ?  Last Office Visit with [facility], [facility] or  Health prescribing provider: 06/30/2017  Future Office visit:    Next 5 appointments (look out 90 days)     Aug 25, 2017  2:15 PM CDT   Return Visit with Blanca Beyer MD   [facility]    [address]  [place] [address]   [phone]                   Routing refill request to provider for review/approval because:  Drug listed as historical/reported

## 2017-08-01 ENCOUNTER — OFFICE VISIT (OUTPATIENT)
Dept: FAMILY MEDICINE | Facility: CLINIC | Age: 62
End: 2017-08-01
Payer: MEDICARE

## 2017-08-01 VITALS
BODY MASS INDEX: 36.65 KG/M2 | HEIGHT: 70 IN | SYSTOLIC BLOOD PRESSURE: 132 MMHG | TEMPERATURE: 99.2 F | HEART RATE: 80 BPM | OXYGEN SATURATION: 94 % | DIASTOLIC BLOOD PRESSURE: 78 MMHG | WEIGHT: 256 LBS

## 2017-08-01 DIAGNOSIS — I10 HYPERTENSION GOAL BP (BLOOD PRESSURE) < 140/90: ICD-10-CM

## 2017-08-01 DIAGNOSIS — E66.01 MORBID OBESITY DUE TO EXCESS CALORIES (H): ICD-10-CM

## 2017-08-01 DIAGNOSIS — N18.30 CHRONIC KIDNEY DISEASE, STAGE III (MODERATE) (H): ICD-10-CM

## 2017-08-01 LAB — HBA1C MFR BLD: 8.4 % (ref 4.3–6)

## 2017-08-01 PROCEDURE — 36415 COLL VENOUS BLD VENIPUNCTURE: CPT | Performed by: FAMILY MEDICINE

## 2017-08-01 PROCEDURE — 83036 HEMOGLOBIN GLYCOSYLATED A1C: CPT | Performed by: FAMILY MEDICINE

## 2017-08-01 PROCEDURE — 99214 OFFICE O/P EST MOD 30 MIN: CPT | Performed by: FAMILY MEDICINE

## 2017-08-01 NOTE — NURSING NOTE
"Chief Complaint   Patient presents with     Diabetes       Initial /69  Pulse 80  Temp 99.2  F (37.3  C) (Oral)  Ht 5' 9.5\" (1.765 m)  Wt 256 lb (116.1 kg)  SpO2 94%  BMI 37.26 kg/m2 Estimated body mass index is 37.26 kg/(m^2) as calculated from the following:    Height as of this encounter: 5' 9.5\" (1.765 m).    Weight as of this encounter: 256 lb (116.1 kg).  Medication Reconciliation: complete    "

## 2017-08-01 NOTE — MR AVS SNAPSHOT
After Visit Summary   8/1/2017    Sandra Dos Santos    MRN: [id number]           Patient Information     Date Of Birth          1955        Visit Information        Provider Department      8/1/2017 1:30 PM Deborah Leslie MD; MULTILINGUAL WORD [facility]        Today's Diagnoses     Type 2 diabetes mellitus with stage 3 chronic kidney disease, with long-term current use of insulin (H)    -  1      Care Instructions    [facility]    If you have any questions regarding to your visit please contact your care team:       Team Red:   Clinic Hours Telephone Number   Dr. Twyla Hutson, NP   7am-7pm  Monday - Thursday   7am-5pm  Fridays  [phone]  (Appointment scheduling available 24/7)    Questions about your visit?   Team Line  [phone]   [facility] [place] [facility] 11am-9pm Monday-Friday Saturday-Sunday- 9am-5pm   [place] - 5pm-9pm Monday-Friday Saturday-Sunday- 9am-5pm  [phone] - [facility]  [phone] - [place]       What options do I have for visits at the clinic other than the traditional office visit?  To expand how we care for you, many of our providers are utilizing electronic visits (e-visits) and telephone visits, when medically appropriate, for interactions with their patients rather than a visit in the clinic.   We also offer nurse visits for many medical concerns. Just like any other service, we will bill your insurance company for this type of visit based on time spent on the phone with your provider. Not all insurance companies cover these visits. Please check with your medical insurance if this type of visit is covered. You will be responsible for any charges that are not paid by your insurance.      E-visits via Farmigo:  generally incur a $35.00 fee.  Telephone visits:  Time spent on the phone: *charged based on time that is spent on the phone in increments of 10  "minutes. Estimated cost:   5-10 mins $30.00   11-20 mins. $59.00   21-30 mins. $85.00     Use [employer] (secure email communication and access to your chart) to send your primary care provider a message or make an appointment. Ask someone on your Team how to sign up for [email or web address].  For a Price Quote for your services, please call our Consumer Price Line at [phone].      As always, Thank you for trusting us with your health care needs!  Ashley VALENCIA MA            Follow-ups after your visit        Your next 10 appointments already scheduled     Aug 25, 2017  2:00 PM CDT   Return Visit with Blanca Beyer MD   [facility] ([facility]    [address] [address] [phone]              Who to contact     If you have questions or need follow up information about today's clinic visit or your schedule please contact [facility] directly at [phone].  Normal or non-critical lab and imaging results will be communicated to you by MyChart, letter or phone within 4 business days after the clinic has received the results. If you do not hear from us within 7 days, please contact the clinic through [employer] or phone. If you have a critical or abnormal lab result, we will notify you by phone as soon as possible.  Submit refill requests through MoviePass or call your pharmacy and they will forward the refill request to us. Please allow 3 business days for your refill to be completed.          Additional Information About Your Visit        MoviePass Information     MoviePass lets you send messages to your doctor, view your test results, renew your prescriptions, schedule appointments and more. To sign up, go to [email or web address] . Click on \"Log in\" on the left side of the screen, which will take you to the Welcome page. Then click on \"Sign up Now\" on the right side of the page.     You will be asked to enter the access code listed below, as well as some personal " "information. Please follow the directions to create your username and password.     Your access code is: [email or web address]  Expires: 2017  6:30 AM     Your access code will  in 90 days. If you need help or a new code, please call your [facility] clinic or [phone].        Care EveryWhere ID     This is your Care EveryWhere ID. This could be used by other organizations to access your [facility] medical records  [id number]        Your Vitals Were     Pulse Temperature Height Pulse Oximetry BMI (Body Mass Index)       80 99.2  F (37.3  C) (Oral) 5' 9.5\" (1.765 m) 94% 37.26 kg/m2        Blood Pressure from Last 3 Encounters:   17 132/78   17 124/77   17 120/64    Weight from Last 3 Encounters:   17 256 lb (116.1 kg)   17 250 lb (113.4 kg)   17 248 lb (112.5 kg)              We Performed the Following     Hemoglobin A1c          Today's Medication Changes          These changes are accurate as of: 17  2:26 PM.  If you have any questions, ask your nurse or doctor.               These medicines have changed or have updated prescriptions.        Dose/Directions    insulin glargine U-300 300 UNIT/ML injection   Commonly known as:  TOUJEO SOLOSTAR   This may have changed:  how much to take   Used for:  Type 2 diabetes mellitus with stage 3 chronic kidney disease, with long-term current use of insulin (H)   Changed by:  Deborah Leslie MD        Dose:  16 Units   Inject 16 Units Subcutaneous At Bedtime   Quantity:  6 mL   Refills:  1            Where to get your medicines      These medications were sent to [facility] [place] - [address]  [address], [place] [address]     Phone:  [phone]     insulin glargine U-300 300 UNIT/ML injection                Primary Care Provider    Physician No Ref-Primary       No address on file        Equal Access to Services     ELISEO RO AH: Hadii aad srikanth hadasho Soomaali, waaxda luqadaha, qaybta kaalmada " araseli meeksblayne brumfieldaan ah. So [facility] [phone].    ATENCIÓN: Si heronla kelly, tiene a fletcher disposición servicios gratuitos de asistencia lingüística. Radha al [phone].    We comply with applicable federal civil rights laws and [place] laws. We do not discriminate on the basis of race, color, national origin, age, disability sex, sexual orientation or gender identity.            Thank you!     [facility] you [facility]  [facility] your [facility]. Our goal is always to provide you with excellent care. Hearing back from our patients is one way we can continue to improve our services. Please take a few minutes to complete the written survey that you may receive in the mail after your visit with us. Thank you!             Your Updated Medication List - Protect others around you: Learn how to safely use, store and throw away your medicines at www.disposemymeds.org.          This list is accurate as of: 8/1/17  2:27 PM.  Always use your most recent med list.                   Brand Name Dispense Instructions for use Diagnosis    ACCU-CHEK ULISES PLUS test strip   Generic drug:  blood glucose monitoring           acetaminophen 500 MG tablet    TYLENOL     Take 500-1,000 mg by mouth every 6 hours as needed for mild pain        * albuterol (2.5 MG/3ML) 0.083% neb solution     25 vial    Take 1 vial (2.5 mg) by nebulization every 4 hours as needed for shortness of breath / dyspnea or wheezing        * albuterol 108 (90 BASE) MCG/ACT Inhaler    PROAIR HFA/PROVENTIL HFA/VENTOLIN HFA    1 Inhaler    Inhale 2 puffs into the lungs every 4 hours as needed for shortness of breath / dyspnea or wheezing    Intermittent asthma, uncomplicated       atorvastatin 10 MG tablet    LIPITOR    90 tablet    Take 1 tablet (10 mg) by mouth daily    Hyperlipidemia LDL goal <100       cholecalciferol 5000 UNITS Tabs tablet    vitamin D3     Take by mouth daily        glimepiride 1 MG tablet    AMARYL     180 tablet    TAKE 3 TABLETS (3 MG) BY MOUTH 2 TIMES DAILY (WITH MEALS)    Type 2 diabetes mellitus with stage 3 chronic kidney disease, with long-term current use of insulin (H)       GLOBAL EASE INJECT PEN NEEDLES 31G X 5 MM   Generic drug:  insulin pen needle           insulin glargine U-300 300 UNIT/ML injection    TOUJEO SOLOSTAR    6 mL    Inject 16 Units Subcutaneous At Bedtime    Type 2 diabetes mellitus with stage 3 chronic kidney disease, with long-term current use of insulin (H)       INVOKANA 100 MG tablet   Generic drug:  canagliflozin     90 tablet    Take 1 tablet (100 mg) by mouth every morning (before breakfast)    Type 2 diabetes mellitus with stage 3 chronic kidney disease, with long-term current use of insulin (H)       lidocaine-prilocaine cream    EMLA    5800 g    Apply topically as needed for moderate pain        losartan 25 MG tablet    COZAAR    90 tablet    Take 1 tablet (25 mg) by mouth daily    Hypertension goal BP (blood pressure) < 140/90       * order for DME     400 each    Test strips for pt's glucometer, brand as covered by insurance. Test four times daily and prn.    Type 2 diabetes mellitus with stage 3 chronic kidney disease, with long-term current use of insulin (H)       * order for DME     400 each    Test strips for pt's glucometer, brand as covered by insurance. Test four times daily and prn.    Type 2 diabetes mellitus with stage 3 chronic kidney disease, with long-term current use of insulin (H)       * order for DME     400 each    Lancets.  Four times daily and prn.    Type 2 diabetes mellitus with stage 3 chronic kidney disease, with long-term current use of insulin (H)       pregabalin 150 MG capsule    LYRICA    60 capsule    Take 1 capsule (150 mg) by mouth 2 times daily    Chronic pain syndrome       ranitidine 150 MG tablet    ZANTAC    180 tablet    Take 1 tablet (150 mg) by mouth 2 times daily    Gastroesophageal reflux disease without esophagitis        tamsulosin 0.4 MG capsule    FLOMAX    90 capsule    Take 1 capsule (0.4 mg) by mouth daily    Benign prostatic hyperplasia, presence of lower urinary tract symptoms unspecified, unspecified morphology       thiamine 100 MG tablet     100 tablet    Take 1 tablet (100 mg) by mouth daily    Type 2 diabetes mellitus with stage 3 chronic kidney disease, with long-term current use of insulin (H)       traMADol 50 MG tablet    ULTRAM    120 tablet    Take 1-2 tablets ( mg) by mouth every 6 hours as needed for pain maximum 4 tablet(s) per day    Chronic pain syndrome       traZODone 50 MG tablet    DESYREL    25 tablet    TAKE ONE-HALF TO ONE TABLET EACH NIGHT AT BEDTIME    Insomnia, unspecified type       * Notice:  This list has 5 medication(s) that are the same as other medications prescribed for you. Read the directions carefully, and ask your doctor or other care provider to review them with you.

## 2017-08-01 NOTE — PROGRESS NOTES
SUBJECTIVE:                                                    Sandra Dos Santos is a 62 year old male who presents to clinic today for the following health issues:    Diabetes Follow-up    Patient is checking blood sugars: once daily.  Results are as follows:       127-  Had a 300 reading a few days ago  Also has been getting steroid shot in his Knees        Diabetic concerns: None     Symptoms of hypoglycemia (low blood sugar): none     Paresthesias (numbness or burning in feet) or sores: No   Date of last diabetic eye exam: 5-25-17      Amount of exercise or physical activity: 4-5 days/week for an average of less than 15 minutes    Problems taking medications regularly: No    Medication side effects: none  Diet: low salt, low fat/cholesterol and diabetic      Hyperlipidemia Follow-Up      Rate your low fat/cholesterol diet?: good    Taking statin?  Yes, no muscle aches from statin    Other lipid medications/supplements?:  none    Hypertension Follow-up      Outpatient blood pressures are not being checked.    Low Salt Diet: no added salt            Problem list and histories reviewed & adjusted, as indicated.  Additional history: as documented  Pt wants To go back and see Dr Borrego for his Knee-he has had surgery in the past and Received Steroid shots    Patient Active Problem List   Diagnosis     HYPERLIPIDEMIA LDL GOAL <100     Chronic kidney disease, stage III (moderate)     Gastroesophageal reflux disease     Hypothyroidism     Urinary hesitancy     Type 2 diabetes mellitus with stage 3 chronic kidney disease, with long-term current use of insulin (H)     Chronic pain syndrome     Morbid obesity due to excess calories (H)     Intermittent asthma, uncomplicated     Benign prostatic hyperplasia, presence of lower urinary tract symptoms unspecified, unspecified morphology     Gastroesophageal reflux disease without esophagitis     Hypertension goal BP (blood pressure) < 140/90     Lumbar stenosis     DDD  (degenerative disc disease), lumbar     Osteoarthritis of lumbar spine, unspecified spinal osteoarthritis complication status     Facet arthropathy     Osteoarthritis of spine with radiculopathy, lumbosacral region     Other spondylosis, lumbosacral region     Aneurysm of internal carotid artery     Past Surgical History:   Procedure Laterality Date     ARTHROSCOPY SHOULDER RT/LT Right      JOINT REPLACEMTN, KNEE RT/LT Bilateral 2013  ,2012    Joint Replacement knee RT/LT       Social History   Substance Use Topics     Smoking status: Never Smoker     Smokeless tobacco: Never Used     Alcohol use No     Family History   Problem Relation Age of Onset     DIABETES Father      Prostate Cancer No family hx of      Colon Cancer No family hx of      Breast Cancer No family hx of          Current Outpatient Prescriptions   Medication Sig Dispense Refill     insulin glargine U-300 (TOUJEO SOLOSTAR) 300 UNIT/ML injection Inject 16 Units Subcutaneous At Bedtime 6 mL 1     pregabalin (LYRICA) 150 MG capsule Take 1 capsule (150 mg) by mouth 2 times daily 60 capsule 1     acetaminophen (TYLENOL) 500 MG tablet Take 500-1,000 mg by mouth every 6 hours as needed for mild pain       glimepiride (AMARYL) 1 MG tablet TAKE 3 TABLETS (3 MG) BY MOUTH 2 TIMES DAILY (WITH MEALS) 180 tablet 1     traZODone (DESYREL) 50 MG tablet TAKE ONE-HALF TO ONE TABLET EACH NIGHT AT BEDTIME 25 tablet 2     atorvastatin (LIPITOR) 10 MG tablet Take 1 tablet (10 mg) by mouth daily 90 tablet 2     INVOKANA 100 MG tablet Take 1 tablet (100 mg) by mouth every morning (before breakfast) 90 tablet 0     albuterol (2.5 MG/3ML) 0.083% neb solution Take 1 vial (2.5 mg) by nebulization every 4 hours as needed for shortness of breath / dyspnea or wheezing 25 vial 1     tamsulosin (FLOMAX) 0.4 MG capsule Take 1 capsule (0.4 mg) by mouth daily 90 capsule 3     ranitidine (ZANTAC) 150 MG tablet Take 1 tablet (150 mg) by mouth 2 times daily 180 tablet 3     order for  DME Test strips for pt's glucometer, brand as covered by insurance. Test four times daily and prn. 400 each 4     order for DME Lancets.  Four times daily and prn. 400 each 4     albuterol (PROAIR HFA/PROVENTIL HFA/VENTOLIN HFA) 108 (90 BASE) MCG/ACT Inhaler Inhale 2 puffs into the lungs every 4 hours as needed for shortness of breath / dyspnea or wheezing 1 Inhaler 11     traMADol (ULTRAM) 50 MG tablet Take 1-2 tablets ( mg) by mouth every 6 hours as needed for pain maximum 4 tablet(s) per day 120 tablet 1     lidocaine-prilocaine (EMLA) cream Apply topically as needed for moderate pain 5800 g 1     order for DME Test strips for pt's glucometer, brand as covered by insurance. Test four times daily and prn. 400 each 4     losartan (COZAAR) 25 MG tablet Take 1 tablet (25 mg) by mouth daily 90 tablet 3     GLOBAL EASE INJECT PEN NEEDLES 31G X 5 MM   3     ACCU-CHEK ULISES PLUS test strip   2     cholecalciferol (VITAMIN D3) 5000 UNITS TABS tablet Take by mouth daily       thiamine 100 MG tablet Take 1 tablet (100 mg) by mouth daily 100 tablet 3     [DISCONTINUED] insulin glargine U-300 (TOUJEO SOLOSTAR) 300 UNIT/ML injection Inject 14 Units Subcutaneous At Bedtime (Patient taking differently: Inject 12 Units Subcutaneous At Bedtime ) 6 mL 3     Allergies   Allergen Reactions     Asa [Aspirin] Other (See Comments)     dizzy     Morphine Itching     Itching     Recent Labs   Lab Test  08/01/17   1341  05/16/17   1143  03/08/17   1251  01/04/17   0757  11/29/16   1101  10/15/12   1108  09/15/10   1232   A1C  8.4*  9.0*  8.7*  7.8*  8.1*   --   7.3*   LDL   --    --    --   73  122*   --   77   HDL   --    --    --   50  45   --   51   TRIG   --    --    --   197*  226*   --   167*   ALT   --    --    --    --   26  18   --    CR   --    --    --    --   1.22  0.80   --    GFRESTIMATED   --    --    --    --   60*  >90   --    GFRESTBLACK   --    --    --    --   73  >90   --    POTASSIUM   --    --    --    --   4.6  " 4.2   --    TSH   --    --    --    --   2.58  1.16   --       BP Readings from Last 3 Encounters:   08/01/17 132/78   07/03/17 124/77   06/30/17 120/64    Wt Readings from Last 3 Encounters:   08/01/17 256 lb (116.1 kg)   07/03/17 250 lb (113.4 kg)   06/30/17 248 lb (112.5 kg)                  Labs reviewed in EPIC          Reviewed and updated as needed this visit by clinical staff       Reviewed and updated as needed this visit by Provider         ROS:    REVIEW OF SYSTEMS:  Complete/DM ROS -  C: NEGATIVE for fatigue, unexpected change in weight  E: NEGATIVE for acute vision problems or changes  R: NEGATIVE for significant cough or shortness of breath  CV: NEGATIVE for chest pain, palpitations or new or worsening peripheral edema  P: NEGATIVE for changes in mood or affect        OBJECTIVE:     /78  Pulse 80  Temp 99.2  F (37.3  C) (Oral)  Ht 5' 9.5\" (1.765 m)  Wt 256 lb (116.1 kg)  SpO2 94%  BMI 37.26 kg/m2  Body mass index is 37.26 kg/(m^2).    EXAM:  /78  Pulse 80  Temp 99.2  F (37.3  C) (Oral)  Ht 5' 9.5\" (1.765 m)  Wt 256 lb (116.1 kg)  SpO2 94%  BMI 37.26 kg/m2      GENERAL APPEARANCE: alert and no acute distress  GENERAL APPEARANCE: obese  PSYCH: mentation appears normal and affect normal/bright  RESP: lungs clear to auscultation - no rales, rhonchi or wheezes  CV: regular rate and rhythm, normal S1 S2, no S3 or S4 and no murmur, click or rub -  EXT: no cyanosis or edema in lower extremities  SKIN: no venous stasis changes  Foot Exam: no        Diagnostic Test Results:  Results for orders placed or performed in visit on 08/01/17 (from the past 24 hour(s))   Hemoglobin A1c   Result Value Ref Range    Hemoglobin A1C 8.4 (H) 4.3 - 6.0 %       ASSESSMENT/PLAN:         BMI:   Estimated body mass index is 37.26 kg/(m^2) as calculated from the following:    Height as of this encounter: 5' 9.5\" (1.765 m).    Weight as of this encounter: 256 lb (116.1 kg).   Weight management plan: low abigail " diet/exercise        1. Uncontrolled type 2 diabetes mellitus with stage 3 chronic kidney disease, with long-term current use of insulin (H)  Discussed needs To watch diet  He has Put on weight  Pt does not take his Insulin consistently -takes 12-16 units based on accuchecks  Advised take 16 units consistently  Watch diet-Diabetic  Advised se Diabetic ed  See me 6 weeks    2. Chronic kidney disease, stage III (moderate)      3. Morbid obesity due to excess calories (H)  As above    4. Hypertension goal BP (blood pressure) < 140/90  controlled    Follow up 6 weeks    Deborah Leslie MD  [facility]

## 2017-08-01 NOTE — PATIENT INSTRUCTIONS
[facility]    If you have any questions regarding to your visit please contact your care team:       Team Red:   Clinic Hours Telephone Number   Dr. Twyla Hutson, NP   7am-7pm  Monday - Thursday   7am-5pm  Fridays  [phone]  (Appointment scheduling available 24/7)    Questions about your visit?   Team Line  [phone]   Urgent Care - [place] - 11am-9pm Monday-Friday Saturday-Sunday- 9am-5pm   [place] - 5pm-9pm Monday-Friday Saturday-Sunday- 9am-5pm  [phone] - [place]   [phone] - [place]       What options do I have for visits at the clinic other than the traditional office visit?  To expand how we care for you, many of our providers are utilizing electronic visits (e-visits) and telephone visits, when medically appropriate, for interactions with their patients rather than a visit in the clinic.   We also offer nurse visits for many medical concerns. Just like any other service, we will bill your insurance company for this type of visit based on time spent on the phone with your provider. Not all insurance companies cover these visits. Please check with your medical insurance if this type of visit is covered. You will be responsible for any charges that are not paid by your insurance.      E-visits via Entigo:  generally incur a $35.00 fee.  Telephone visits:  Time spent on the phone: *charged based on time that is spent on the phone in increments of 10 minutes. Estimated cost:   5-10 mins $30.00   11-20 mins. $59.00   21-30 mins. $85.00     Use [employer] (secure email communication and access to your chart) to send your primary care provider a message or make an appointment. Ask someone on your Team how to sign up for [employer].  For a Price Quote for your services, please call our Consumer Price Line at [phone].      As always, Thank you for trusting us with your health care needs!  Ashley VALENCIA  MA

## 2017-08-04 ENCOUNTER — TELEPHONE (OUTPATIENT)
Dept: NURSING | Facility: CLINIC | Age: 62
End: 2017-08-04

## 2017-08-11 ENCOUNTER — TELEPHONE (OUTPATIENT)
Dept: FAMILY MEDICINE | Facility: CLINIC | Age: 62
End: 2017-08-11

## 2017-08-11 NOTE — TELEPHONE ENCOUNTER
Panel Management Review      Patient has the following on his problem list:     Diabetes    ASA: Failed    Last A1C  Lab Results   Component Value Date    A1C 8.4 08/01/2017    A1C 9.0 05/16/2017    A1C 8.7 03/08/2017    A1C 7.8 01/04/2017    A1C 8.1 11/29/2016     A1C tested: FAILED    Last LDL:    Lab Results   Component Value Date    CHOL 162 01/04/2017     Lab Results   Component Value Date    HDL 50 01/04/2017     Lab Results   Component Value Date    LDL 73 01/04/2017     Lab Results   Component Value Date    TRIG 197 01/04/2017     Lab Results   Component Value Date    CHOLHDLRATIO 3.0 09/15/2010     Lab Results   Component Value Date    NHDL 112 01/04/2017       Is the patient on a Statin? YES             Is the patient on Aspirin? NO    Medications     HMG CoA Reductase Inhibitors    atorvastatin (LIPITOR) 10 MG tablet          Last three blood pressure readings:  BP Readings from Last 3 Encounters:   08/01/17 132/78   07/03/17 124/77   06/30/17 120/64       Date of last diabetes office visit: 08/01/2017     Tobacco History:     History   Smoking Status     Never Smoker   Smokeless Tobacco     Never Used             Composite cancer screening  Chart review shows that this patient is due/due soon for the following None  Summary:    Patient is due/failing the following:   A1C    Action needed:   none    Type of outreach:    None, routed to provider for review.    Questions for provider review:    None                                                                                                                                    Cullen Madrid       Chart routed to Care Team .

## 2017-08-17 DIAGNOSIS — N18.30 TYPE 2 DIABETES MELLITUS WITH STAGE 3 CHRONIC KIDNEY DISEASE, WITH LONG-TERM CURRENT USE OF INSULIN (H): ICD-10-CM

## 2017-08-17 DIAGNOSIS — Z79.4 TYPE 2 DIABETES MELLITUS WITH STAGE 3 CHRONIC KIDNEY DISEASE, WITH LONG-TERM CURRENT USE OF INSULIN (H): ICD-10-CM

## 2017-08-17 DIAGNOSIS — E11.22 TYPE 2 DIABETES MELLITUS WITH STAGE 3 CHRONIC KIDNEY DISEASE, WITH LONG-TERM CURRENT USE OF INSULIN (H): ICD-10-CM

## 2017-08-18 ENCOUNTER — ALLIED HEALTH/NURSE VISIT (OUTPATIENT)
Dept: NURSING | Facility: CLINIC | Age: 62
End: 2017-08-18

## 2017-08-18 DIAGNOSIS — N18.30 TYPE 2 DIABETES MELLITUS WITH STAGE 3 CHRONIC KIDNEY DISEASE, WITH LONG-TERM CURRENT USE OF INSULIN (H): Primary | ICD-10-CM

## 2017-08-18 DIAGNOSIS — Z79.4 TYPE 2 DIABETES MELLITUS WITH STAGE 3 CHRONIC KIDNEY DISEASE, WITH LONG-TERM CURRENT USE OF INSULIN (H): Primary | ICD-10-CM

## 2017-08-18 DIAGNOSIS — E11.22 TYPE 2 DIABETES MELLITUS WITH STAGE 3 CHRONIC KIDNEY DISEASE, WITH LONG-TERM CURRENT USE OF INSULIN (H): Primary | ICD-10-CM

## 2017-08-18 PROCEDURE — 99207 ZZC HEALTH COACHING, NO CHARGE: CPT

## 2017-08-18 NOTE — MR AVS SNAPSHOT
"              After Visit Summary   8/18/2017    Sandra Dos Santos    MRN: [id number]           Patient Information     Date Of Birth          1955        Visit Information        Provider Department      8/18/2017 1:45 PM MULTILINGUAL WORD; HEALTH  - REGION 1 [facility]        Today's Diagnoses     Type 2 diabetes mellitus with stage 3 chronic kidney disease, with long-term current use of insulin (H)    -  1       Follow-ups after your visit        Your next 10 appointments already scheduled     Aug 25, 2017  2:00 PM CDT   Return Visit with Blanca Beyer MD   [facility] ([address] [address] [phone]            Sep 12, 2017  1:40 PM CDT   SHORT with Deborah Leslie MD   [facility] ([address] [address] [phone]              Who to contact     If you have questions or need follow up information about today's clinic visit or your schedule please contact [facility] directly at [phone].  Normal or non-critical lab and imaging results will be communicated to you by MyChart, letter or phone within 4 business days after the clinic has received the results. If you do not hear from us within 7 days, please contact the clinic through MyChart or phone. If you have a critical or abnormal lab result, we will notify you by phone as soon as possible.  Submit refill requests through [employer] or call your pharmacy and they will forward the refill request to us. Please allow 3 business days for your refill to be completed.          Additional Information About Your Visit        MyChart Information     [employer] lets you send messages to your doctor, view your test results, renew your prescriptions, schedule appointments and more. To sign up, go to [email or web address] . Click on \"Log in\" on the left side of the screen, which will take you to the " "Welcome page. Then click on \"Sign up Now\" on the right side of the page.     You will be asked to enter the access code listed below, as well as some personal information. Please follow the directions to create your username and password.     Your access code is: [email or web address]  Expires: 2017  6:30 AM     Your access code will  in 90 days. If you need help or a new code, please call your [facility] clinic or [phone].        Care EveryWhere ID     This is your Care EveryWhere ID. This could be used by other organizations to access your [facility] medical records  [id number]         Blood Pressure from Last 3 Encounters:   17 132/78   17 124/77   17 120/64    Weight from Last 3 Encounters:   17 256 lb (116.1 kg)   17 250 lb (113.4 kg)   17 248 lb (112.5 kg)              Today, you had the following     No orders found for display       Primary Care Provider    Physician No Ref-Primary       No address on file        Equal Access to Services     : Hadii more russoo Abelardo, waaxda luqadaha, qaybta kaalmavicenta meeks, araseli liao . So [facility] [phone].    ATENCIÓN: Si habla español, tiene a fletcher disposición servicios gratuitos de asistencia lingüística. Radha al [phone].    We comply with applicable federal civil rights laws and [place] laws. We do not discriminate on the basis of race, color, national origin, age, disability sex, sexual orientation or gender identity.            Thank you!     Thank you for choosing [facility]  for your care. Our goal is always to provide you with excellent care. Hearing back from our patients is one way we can continue to improve our services. Please take a few minutes to complete the written survey that you may receive in the mail after your visit with us. Thank you!             Your Updated Medication List - Protect others around you: Learn how to safely use, store and " throw away your medicines at www.disposemymeds.org.          This list is accurate as of: 8/18/17 11:59 PM.  Always use your most recent med list.                   Brand Name Dispense Instructions for use Diagnosis    ACCU-CHEK ULISES PLUS test strip   Generic drug:  blood glucose monitoring           acetaminophen 500 MG tablet    TYLENOL     Take 500-1,000 mg by mouth every 6 hours as needed for mild pain        * albuterol (2.5 MG/3ML) 0.083% neb solution     25 vial    Take 1 vial (2.5 mg) by nebulization every 4 hours as needed for shortness of breath / dyspnea or wheezing        * albuterol 108 (90 BASE) MCG/ACT Inhaler    PROAIR HFA/PROVENTIL HFA/VENTOLIN HFA    1 Inhaler    Inhale 2 puffs into the lungs every 4 hours as needed for shortness of breath / dyspnea or wheezing    Intermittent asthma, uncomplicated       atorvastatin 10 MG tablet    LIPITOR    90 tablet    Take 1 tablet (10 mg) by mouth daily    Hyperlipidemia LDL goal <100       cholecalciferol 5000 UNITS Tabs tablet    vitamin D3     Take by mouth daily        glimepiride 1 MG tablet    AMARYL    180 tablet    TAKE 3 TABLETS (3 MG) BY MOUTH 2 TIMES DAILY (WITH MEALS)    Type 2 diabetes mellitus with stage 3 chronic kidney disease, with long-term current use of insulin (H)       GLOBAL EASE INJECT PEN NEEDLES 31G X 5 MM   Generic drug:  insulin pen needle           insulin glargine U-300 300 UNIT/ML injection    TOUJEO SOLOSTAR    6 mL    Inject 16 Units Subcutaneous At Bedtime        INVOKANA 100 MG tablet   Generic drug:  canagliflozin     90 tablet    Take 1 tablet (100 mg) by mouth every morning (before breakfast)    Type 2 diabetes mellitus with stage 3 chronic kidney disease, with long-term current use of insulin (H)       lidocaine-prilocaine cream    EMLA    5800 g    Apply topically as needed for moderate pain        losartan 25 MG tablet    COZAAR    90 tablet    Take 1 tablet (25 mg) by mouth daily    Hypertension goal BP (blood  pressure) < 140/90       * order for DME     400 each    Test strips for pt's glucometer, brand as covered by insurance. Test four times daily and prn.    Type 2 diabetes mellitus with stage 3 chronic kidney disease, with long-term current use of insulin (H)       * order for DME     400 each    Test strips for pt's glucometer, brand as covered by insurance. Test four times daily and prn.    Type 2 diabetes mellitus with stage 3 chronic kidney disease, with long-term current use of insulin (H)       * order for DME     400 each    Lancets.  Four times daily and prn.    Type 2 diabetes mellitus with stage 3 chronic kidney disease, with long-term current use of insulin (H)       pregabalin 150 MG capsule    LYRICA    60 capsule    Take 1 capsule (150 mg) by mouth 2 times daily    Chronic pain syndrome       ranitidine 150 MG tablet    ZANTAC    180 tablet    Take 1 tablet (150 mg) by mouth 2 times daily    Gastroesophageal reflux disease without esophagitis       tamsulosin 0.4 MG capsule    FLOMAX    90 capsule    Take 1 capsule (0.4 mg) by mouth daily    Benign prostatic hyperplasia, presence of lower urinary tract symptoms unspecified, unspecified morphology       thiamine 100 MG tablet     100 tablet    Take 1 tablet (100 mg) by mouth daily    Type 2 diabetes mellitus with stage 3 chronic kidney disease, with long-term current use of insulin (H)       traMADol 50 MG tablet    ULTRAM    120 tablet    Take 1-2 tablets ( mg) by mouth every 6 hours as needed for pain maximum 4 tablet(s) per day    Chronic pain syndrome       traZODone 50 MG tablet    DESYREL    25 tablet    TAKE ONE-HALF TO ONE TABLET EACH NIGHT AT BEDTIME    Insomnia, unspecified type       * Notice:  This list has 5 medication(s) that are the same as other medications prescribed for you. Read the directions carefully, and ask your doctor or other care provider to review them with you.

## 2017-08-21 NOTE — TELEPHONE ENCOUNTER
glimepiride (AMARYL) 1 MG tablet         Last Written Prescription Date: 5/16/17  Last Fill Quantity: 180, # refills: 1  Last Office Visit with [facility], [facility] or  Health prescribing provider:  8/1/17   Next 5 appointments (look out 90 days)     Aug 25, 2017  2:00 PM CDT   Return Visit with Blanca Beyer MD, MULTILINGUAL WORD   [facility]    [address] [place] [address]   [phone]            Sep 12, 2017  1:40 PM CDT   SHORT with Deborah Leslie MD   [facility]    [address] [place] [address]   [phone]            Sep 12, 2017  2:30 PM CDT   Nurse Only with HEALTH  - REGION 1   [facility] ([facility])    [address] [address]   [phone]                   BP Readings from Last 3 Encounters:   08/01/17 132/78   07/03/17 124/77   06/30/17 120/64     Lab Results   Component Value Date    MICROL 20 11/29/2016     Lab Results   Component Value Date    UMALCR 12.00 11/29/2016     Creatinine   Date Value Ref Range Status   11/29/2016 1.22 0.66 - 1.25 mg/dL Final   ]  GFR Estimate   Date Value Ref Range Status   11/29/2016 60 (L) >60 mL/min/1.7m2 Final     Comment:     Non  GFR Calc   10/15/2012 >90 >60 mL/min/1.7m2 Final     GFR Estimate If Black   Date Value Ref Range Status   11/29/2016 73 >60 mL/min/1.7m2 Final     Comment:      GFR Calc   10/15/2012 >90 >60 mL/min/1.7m2 Final     Lab Results   Component Value Date    CHOL 162 01/04/2017     Lab Results   Component Value Date    HDL 50 01/04/2017     Lab Results   Component Value Date    LDL 73 01/04/2017     Lab Results   Component Value Date    TRIG 197 01/04/2017     Lab Results   Component Value Date    CHOLHDLRATIO 3.0 09/15/2010     Lab Results   Component Value Date    AST 10 11/29/2016     Lab Results   Component Value Date    ALT 26 11/29/2016     Lab  Results   Component Value Date    A1C 8.4 08/01/2017    A1C 9.0 05/16/2017    A1C 8.7 03/08/2017    A1C 7.8 01/04/2017    A1C 8.1 11/29/2016     Potassium   Date Value Ref Range Status   11/29/2016 4.6 3.4 - 5.3 mmol/L Final

## 2017-08-21 NOTE — NURSING NOTE
"August 21, 2017    [facility]  [address]  [place] [address] [phone]  Health Coaching Progress Note    Patient Name: Sandra Dos Santos Date: August 21, 2017      Session Length: 75      DATA    PRM Master Survey Scores Reviewed: Yes    Core Healthy Days Survey:    Would you say that in general your health is: : Good    FERDINAND Score (Last Two) 8/18/2017   FERDINAND Raw Score 36   Activation Score 75.5   FERDINAND Level 4       PHQ-2 Score 8/18/2017 5/16/2017   PHQ-2 Total Score Interpretation - Positive if 3 or more points; Administer PHQ-9 if positive 0 0       PHQ-9 SCORE 9/15/2010 12/30/2016   Total Score 11 -   Total Score - 0       Treatment Objective(s) Addressed in This Session:  Target Behavior(s): diet/weight loss    Current Stressors / Issues:  Sandra talks a lot about his stress/anxiety today. Stress with family, estate back home    What Patient Does Well:   Staceyor states that he loves to exercise, he also mentions that he eats a pretty healthy diet consisting of a lot of vegetables, meat and bread.  Previous Successes:   Did not discuss  Areas in Need of Improvement:   Patient would like to focus on reducing bread intake.  Writer discussed the plate planner and how he could use this to limit his bread intake and make a healthier meal.  He mentions that he would like a sheet that states what he can eat and what he can't eat.  Writer explained that there aren't any foods that you \"can't eat\" just foods that are better for you than others, writer agreed to provide document that shows healthier choices so when his wife Is preparing the meals she will know what to make. Patient would like this document emailed to his daughter so she can print it, patient would provide daughter with email address. Sandra has a weight loss goal fo 50 pounds  Barriers to Change:   Stress   Reasons for Change:   Sandra would like to change to extend his lifespan  Plan/Goal for the " Next 4 Weeks:   Did not discuss goals       Intervention:  Motivational Interviewing    MI Intervention: Expressed Empathy/Understanding, Supported Autonomy, Collaboration, Evocation, Permission to raise concern or advise, Open-ended questions, Reflections: simple and complex, Change talk (evoked) and Reframe     Change Talk Expressed by the Patient: Desire to change Ability to change Reasons to change Need to change Committment to change Activation Taking steps    Provider Response to Change Talk: E - Evoked more info from patient about behavior change, A - Affirmed patient's thoughts, decisions, or attempts at behavior change, R - Reflected patient's change talk and S - Summarized patient's change talk statements    Assessment / Progress on Treatment Objective(s) / Homework:  Will set goals at next Health Coaching follow-up         Plan: (Homework, other):  Patient was encouraged to continue to seek condition-related information and education, as well as schedule a follow up appointment with the Health  in 3 weeks. Patient has set self-identified goals and will monitor progress until the next appointment.  Follow-up scheduled for Setember 12th at 2:30 pm at the [facility].      Mary Rowan, RD, LD

## 2017-08-22 RX ORDER — GLIMEPIRIDE 1 MG/1
TABLET ORAL
Qty: 180 TABLET | Refills: 0 | Status: SHIPPED | OUTPATIENT
Start: 2017-08-22 | End: 2017-08-29

## 2017-08-22 NOTE — TELEPHONE ENCOUNTER
Prescription approved per [facility] [place] Refill Protocol.  Meggan Foreman, RN - BC

## 2017-08-29 NOTE — PATIENT INSTRUCTIONS
[facility]    If you have any questions regarding to your visit please contact your care team:       Team Red:   Clinic Hours Telephone Number   Dr. Twyla Lucio  (pediatrics)  Paula Hutson NP 7am-7pm  Monday - Thursday   7am-5pm  Fridays  [phone] (fax)    Luis Alberto KAUR  [phone]   Urgent Care - [place] and [place] Monday-Friday  [place] - 11am-8pm  Saturday-Sunday  Both sites - 9am-5pm  [phone] - [facility]  [phone] - [place]       What options do I have for visits at the clinic other than the traditional office visit?  To expand how we care for you, many of our providers are utilizing electronic visits (e-visits) and telephone visits, when medically appropriate, for interactions with their patients rather than a visit in the clinic.   We also offer nurse visits for many medical concerns. Just like any other service, we will bill your insurance company for this type of visit based on time spent on the phone with your provider. Not all insurance companies cover these visits. Please check with your medical insurance if this type of visit is covered. You will be responsible for any charges that are not paid by your insurance.      E-visits via [employer]:  generally incur a $35.00 fee.  Telephone visits:  Time spent on the phone: *charged based on time that is spent on the phone in increments of 10 minutes. Estimated cost:   5-10 mins $30.00   11-20 mins. $59.00   21-30 mins. $85.00     As always, Thank you for trusting us with your health care needs!              Sertraline tablets  What is this medicine?  SERTRALINE (SER tra rob) is used to treat depression. It may also be used to treat obsessive compulsive disorder, panic disorder, post-trauma stress, premenstrual dysphoric disorder (PMDD) or social anxiety.  How should I use this medicine?  Take this medicine by mouth with a glass of water. Follow the directions on  the prescription label. You can take it with or without food. Take your medicine at regular intervals. Do not take your medicine more often than directed. Do not stop taking this medicine suddenly except upon the advice of your doctor. Stopping this medicine too quickly may cause serious side effects or your condition may worsen.  A special MedGuide will be given to you by the pharmacist with each prescription and refill. Be sure to read this information carefully each time.  Talk to your pediatrician regarding the use of this medicine in children. While this drug may be prescribed for children as young as 7 years for selected conditions, precautions do apply.  What side effects may I notice from receiving this medicine?  Side effects that you should report to your doctor or health care professional as soon as possible:    allergic reactions like skin rash, itching or hives, swelling of the face, lips, or tongue    black or bloody stools, blood in the urine or vomit    fast, irregular heartbeat    feeling faint or lightheaded, falls    hallucination, loss of contact with reality    seizures    suicidal thoughts or other mood changes    unusual bleeding or bruising    unusually weak or tired    vomiting  Side effects that usually do not require medical attention (report to your doctor or health care professional if they continue or are bothersome):    change in appetite    change in sex drive or performance    diarrhea    increased sweating    indigestion, nausea    tremors  What may interact with this medicine?  Do not take this medicine with any of the following medications:    certain medicines for fungal infections like fluconazole, itraconazole, ketoconazole, posaconazole, voriconazole    cisapride    disulfiram    dofetilide    linezolid    MAOIs like Carbex, Eldepryl, Marplan, Nardil, and Parnate    metronidazole    methylene blue (injected into a vein)    pimozide    thioridazine    ziprasidone  This medicine  may also interact with the following medications:    alcohol    aspirin and aspirin-like medicines    certain medicines for depression, anxiety, or psychotic disturbances    certain medicines for irregular heart beat like flecainide, propafenone    certain medicines for migraine headaches like almotriptan, eletriptan, frovatriptan, naratriptan, rizatriptan, sumatriptan, zolmitriptan    certain medicines for sleep    certain medicines for seizures like carbamazepine, valproic acid, phenytoin    certain medicines that treat or prevent blood clots like warfarin, enoxaparin, dalteparin    cimetidine    digoxin    diuretics    fentanyl    furazolidone    isoniazid    lithium    NSAIDs, medicines for pain and inflammation, like ibuprofen or naproxen    other medicines that prolong the QT interval (cause an abnormal heart rhythm)    procarbazine    rasagiline    supplements like [place]'s wort, kava kava, valerian    tolbutamide    tramadol    tryptophan  What if I miss a dose?  If you miss a dose, take it as soon as you can. If it is almost time for your next dose, take only that dose. Do not take double or extra doses.  Where should I keep my medicine?  Keep out of the reach of children.  Store at room temperature between 15 and 30 degrees C (59 and 86 degrees F). Throw away any unused medicine after the expiration date.  What should I tell my health care provider before I take this medicine?  They need to know if you have any of these conditions:    bipolar disorder or a family history of bipolar disorder    diabetes    glaucoma    heart disease    high blood pressure    history of irregular heartbeat    history of low levels of calcium, magnesium, or potassium in the blood    if you often drink alcohol    liver disease    receiving electroconvulsive therapy    seizures    suicidal thoughts, plans, or attempt; a previous suicide attempt by you or a family member    thyroid disease    an unusual or allergic reaction to  sertraline, other medicines, foods, dyes, or preservatives    pregnant or trying to get pregnant    breast-feeding  What should I watch for while using this medicine?  Tell your doctor if your symptoms do not get better or if they get worse. Visit your doctor or health care professional for regular checks on your progress. Because it may take several weeks to see the full effects of this medicine, it is important to continue your treatment as prescribed by your doctor.  Patients and their families should watch out for new or worsening thoughts of suicide or depression. Also watch out for sudden changes in feelings such as feeling anxious, agitated, panicky, irritable, hostile, aggressive, impulsive, severely restless, overly excited and hyperactive, or not being able to sleep. If this happens, especially at the beginning of treatment or after a change in dose, call your health care professional.  You may get drowsy or dizzy. Do not drive, use machinery, or do anything that needs mental alertness until you know how this medicine affects you. Do not stand or sit up quickly, especially if you are an older patient. This reduces the risk of dizzy or fainting spells. Alcohol may interfere with the effect of this medicine. Avoid alcoholic drinks.  Your mouth may get dry. Chewing sugarless gum or sucking hard candy, and drinking plenty of water may help. Contact your doctor if the problem does not go away or is severe.  NOTE:This sheet is a summary. It may not cover all possible information. If you have questions about this medicine, talk to your doctor, pharmacist, or health care provider. Copyright  2017 [employer]      Discharge JANELL Todd  [facility]

## 2017-08-29 NOTE — PROGRESS NOTES
SUBJECTIVE:   Sandra Dos Santos is a 62 year old male who presents to clinic today for the following health issues:      Diabetes Follow-up    Patient is checking blood sugars: once daily.  Results are as follows:       am - 129 to 190-he does not bring his Glucometer Today        Diabetic concerns: None     Symptoms of hypoglycemia (low blood sugar): none     Paresthesias (numbness or burning in feet) or sores: No   Date of last diabetic eye exam: 5/25/17      Amount of exercise or physical activity: 4-5 days/week for an average of     Problems taking medications regularly: No    Medication side effects: none  Diet: diabetic      Hyperlipidemia Follow-Up      Rate your low fat/cholesterol diet?: good    Taking statin?  Yes, no muscle aches from statin    Other lipid medications/supplements?:  none    Hypertension Follow-up      Outpatient blood pressures are not being checked.    Low Salt Diet: no added salt      Pt says he gets very anxious  when he talks To his family in [facility]  He would Like something to calm him down  When he is stressed His sugars go up  No depression  No suicidal ideation or thoughts  Abnormal Mood Symptoms  Onset: as above    Description:   Depression: no  Anxiety: yes    Accompanying Signs & Symptoms:  Still participating in activities that you used to enjoy: YES    Fatigue: no  Irritability: no  Difficulty concentrating: no  Changes in appetite: no  Problems with sleep: no  Heart racing/beating fast : no  Thoughts of hurting yourself or others: none    History:   Recent stress: YES- family stress  Prior depression hospitalization: None  Family history of depression: no  Family history of anxiety: no    Precipitating factors:   Alcohol/drug use: no    Alleviating factors:      Therapies Tried and outcome: none          Problem list and histories reviewed & adjusted, as indicated.  Additional history: as documented    Patient Active Problem List   Diagnosis     HYPERLIPIDEMIA LDL GOAL  <100     Chronic kidney disease, stage III (moderate)     Gastroesophageal reflux disease     Hypothyroidism     Urinary hesitancy     Type 2 diabetes mellitus with stage 3 chronic kidney disease, with long-term current use of insulin (H)     Chronic pain syndrome     Morbid obesity due to excess calories (H)     Intermittent asthma, uncomplicated     Benign prostatic hyperplasia, presence of lower urinary tract symptoms unspecified, unspecified morphology     Gastroesophageal reflux disease without esophagitis     Hypertension goal BP (blood pressure) < 140/90     Lumbar stenosis     DDD (degenerative disc disease), lumbar     Osteoarthritis of lumbar spine, unspecified spinal osteoarthritis complication status     Facet arthropathy     Osteoarthritis of spine with radiculopathy, lumbosacral region     Other spondylosis, lumbosacral region     Aneurysm of internal carotid artery     Uncontrolled type 2 diabetes mellitus with stage 3 chronic kidney disease, with long-term current use of insulin (H)     Past Surgical History:   Procedure Laterality Date     ARTHROSCOPY SHOULDER RT/LT Right      JOINT REPLACEMTN, KNEE RT/LT Bilateral 2013  ,2012    Joint Replacement knee RT/LT       Social History   Substance Use Topics     Smoking status: Never Smoker     Smokeless tobacco: Never Used     Alcohol use No     Family History   Problem Relation Age of Onset     DIABETES Father      Prostate Cancer No family hx of      Colon Cancer No family hx of      Breast Cancer No family hx of          Current Outpatient Prescriptions   Medication Sig Dispense Refill     glimepiride (AMARYL) 1 MG tablet TAKE 3 TABLETS BY MOUTH TWO TIMES A DAY WITH MEALS 180 tablet 0     insulin glargine U-300 (TOUJEO SOLOSTAR) 300 UNIT/ML injection Inject 16 Units Subcutaneous At Bedtime 6 mL 1     pregabalin (LYRICA) 150 MG capsule Take 1 capsule (150 mg) by mouth 2 times daily 60 capsule 1     INVOKANA 100 MG tablet Take 1 tablet (100 mg) by mouth  every morning (before breakfast) 90 tablet 0     GLOBAL EASE INJECT PEN NEEDLES 31G X 5 MM Use daily 100 each 3     albuterol (2.5 MG/3ML) 0.083% neb solution Take 1 vial (2.5 mg) by nebulization every 4 hours as needed for shortness of breath / dyspnea or wheezing 25 vial 1     lidocaine-prilocaine (EMLA) cream Apply topically as needed for moderate pain 5800 g 1     Cyanocobalamin (B-12) 1000 MCG TBCR Take 1,000 mcg by mouth daily 100 tablet 1     sertraline (ZOLOFT) 50 MG tablet Take 1/2 tablet (25 mg) for 1-2 weeks, then increase to 1 tablet orally daily 30 tablet 0     thiamine 100 MG tablet Take 1 tablet (100 mg) by mouth daily 100 tablet 3     acetaminophen (TYLENOL) 500 MG tablet Take 500-1,000 mg by mouth every 6 hours as needed for mild pain       traZODone (DESYREL) 50 MG tablet TAKE ONE-HALF TO ONE TABLET EACH NIGHT AT BEDTIME 25 tablet 2     atorvastatin (LIPITOR) 10 MG tablet Take 1 tablet (10 mg) by mouth daily 90 tablet 2     tamsulosin (FLOMAX) 0.4 MG capsule Take 1 capsule (0.4 mg) by mouth daily 90 capsule 3     ranitidine (ZANTAC) 150 MG tablet Take 1 tablet (150 mg) by mouth 2 times daily 180 tablet 3     order for DME Test strips for pt's glucometer, brand as covered by insurance. Test four times daily and prn. 400 each 4     order for DME Lancets.  Four times daily and prn. 400 each 4     albuterol (PROAIR HFA/PROVENTIL HFA/VENTOLIN HFA) 108 (90 BASE) MCG/ACT Inhaler Inhale 2 puffs into the lungs every 4 hours as needed for shortness of breath / dyspnea or wheezing 1 Inhaler 11     traMADol (ULTRAM) 50 MG tablet Take 1-2 tablets ( mg) by mouth every 6 hours as needed for pain maximum 4 tablet(s) per day 120 tablet 1     order for DME Test strips for pt's glucometer, brand as covered by insurance. Test four times daily and prn. 400 each 4     losartan (COZAAR) 25 MG tablet Take 1 tablet (25 mg) by mouth daily 90 tablet 3     ACCU-CHEK ULISES PLUS test strip   2     cholecalciferol (VITAMIN  D3) 5000 UNITS TABS tablet Take by mouth daily       [DISCONTINUED] albuterol (2.5 MG/3ML) 0.083% neb solution Take 1 vial (2.5 mg) by nebulization every 4 hours as needed for shortness of breath / dyspnea or wheezing 25 vial 1     [DISCONTINUED] lidocaine-prilocaine (EMLA) cream Apply topically as needed for moderate pain 5800 g 1     Allergies   Allergen Reactions     Asa [Aspirin] Other (See Comments)     dizzy     Morphine Itching     Itching     Recent Labs   Lab Test  09/12/17   1423  08/01/17   1341  05/16/17   1143   01/04/17   0757  11/29/16   1101  10/15/12   1108  09/15/10   1232   A1C  8.6*  8.4*  9.0*   < >  7.8*  8.1*   --   7.3*   LDL   --    --    --    --   73  122*   --   77   HDL   --    --    --    --   50  45   --   51   TRIG   --    --    --    --   197*  226*   --   167*   ALT   --    --    --    --    --   26  18   --    CR   --    --    --    --    --   1.22  0.80   --    GFRESTIMATED   --    --    --    --    --   60*  >90   --    GFRESTBLACK   --    --    --    --    --   73  >90   --    POTASSIUM   --    --    --    --    --   4.6  4.2   --    TSH   --    --    --    --    --   2.58  1.16   --     < > = values in this interval not displayed.      BP Readings from Last 3 Encounters:   09/12/17 122/70   08/01/17 132/78   07/03/17 124/77    Wt Readings from Last 3 Encounters:   09/12/17 255 lb (115.7 kg)   08/01/17 256 lb (116.1 kg)   07/03/17 250 lb (113.4 kg)                  Labs reviewed in EPIC          Reviewed and updated as needed this visit by clinical staff       Reviewed and updated as needed this visit by Provider         ROS:    REVIEW OF SYSTEMS:  Complete/DM ROS -  C: NEGATIVE for fatigue, unexpected change in weight  E: NEGATIVE for acute vision problems or changes  R: NEGATIVE for significant cough or shortness of breath  CV: NEGATIVE for chest pain, palpitations or new or worsening peripheral edema  PSYCHIATRIC: as above        OBJECTIVE:     /70  Pulse 81  Temp  "98.4  F (36.9  C)  Resp 13  Ht 5' 9.5\" (1.765 m)  Wt 255 lb (115.7 kg)  SpO2 98%  BMI 37.12 kg/m2  Body mass index is 37.12 kg/(m^2).    EXAM:  /70  Pulse 81  Temp 98.4  F (36.9  C)  Resp 13  Ht 5' 9.5\" (1.765 m)  Wt 255 lb (115.7 kg)  SpO2 98%  BMI 37.12 kg/m2      GENERAL APPEARANCE: alert and no acute distress  GENERAL APPEARANCE: obese  PSYCH: mentation appears normal and affect normal/bright  RESP: lungs clear to auscultation - no rales, rhonchi or wheezes  CV: regular rate and rhythm, normal S1 S2, no S3 or S4 and no murmur, click or rub -  EXT: no cyanosis or edema in lower extremities  SKIN: no venous stasis changes  Foot Exam: no        Diagnostic Test Results:  Results for orders placed or performed in visit on 09/12/17 (from the past 24 hour(s))   Hemoglobin A1c   Result Value Ref Range    Hemoglobin A1C 8.6 (H) 4.3 - 6.0 %       ASSESSMENT/PLAN:       1. Type 2 diabetes mellitus with stage 3 chronic kidney disease, with long-term current use of insulin (H)  Advised increase [place] to 18 units  Check accuchecks TID and see MTM in 10 days  - glimepiride (AMARYL) 1 MG tablet; TAKE 3 TABLETS BY MOUTH TWO TIMES A DAY WITH MEALS  Dispense: 180 tablet; Refill: 0  - insulin glargine U-300 (TOUJEO SOLOSTAR) 300 UNIT/ML injection; Inject 16 Units Subcutaneous At Bedtime  Dispense: 6 mL; Refill: 1  - INVOKANA 100 MG tablet; Take 1 tablet (100 mg) by mouth every morning (before breakfast)  Dispense: 90 tablet; Refill: 0  - GLOBAL EASE INJECT PEN NEEDLES 31G X 5 MM; Use daily  Dispense: 100 each; Refill: 3  - Hemoglobin A1c  See me 6 weeks    2. Chronic pain syndrome  refilled  - pregabalin (LYRICA) 150 MG capsule; Take 1 capsule (150 mg) by mouth 2 times daily  Dispense: 60 capsule; Refill: 1  - lidocaine-prilocaine (EMLA) cream; Apply topically as needed for moderate pain  Dispense: 5800 g; Refill: 1    3. Chronic kidney disease, stage III (moderate)  Stable     4. Hyperlipidemia LDL goal " <100  Stable     5. Intermittent asthma, uncomplicated  controlled  - albuterol (2.5 MG/3ML) 0.083% neb solution; Take 1 vial (2.5 mg) by nebulization every 4 hours as needed for shortness of breath / dyspnea or wheezing  Dispense: 25 vial; Refill: 1    6. Pernicious anemia    - Cyanocobalamin (B-12) 1000 MCG TBCR; Take 1,000 mcg by mouth daily  Dispense: 100 tablet; Refill: 1    7. Anxiety  SEE EPIC care orders  The potential side effects of this medication have been discussed with the patient.  Call if any significant problems with these are experienced.    - sertraline (ZOLOFT) 50 MG tablet; Take 1/2 tablet (25 mg) for 1-2 weeks, then increase to 1 tablet orally daily  Dispense: 30 tablet; Refill: 0  I've explained to him that drugs of the SSRI class can have side effects such as weight gain, sexual dysfunction, insomnia, headache, nausea. These medications are generally effective at alleviating symptoms of anxiety and/or depression. Let me know if significant side effects do occur.    See me 6 weeks  8. Need for prophylactic vaccination and inoculation against influenza  done  - FLU VAC, SPLIT VIRUS IM > 3 YO (QUADRIVALENT) [38223]  - ADMIN INFLUENZA (For MEDICARE Patients ONLY) []    Work on weight loss  Regular exercise    Deborah Leslie MD  [facility]  Injectable Influenza Immunization Documentation    1.  Are you sick today? (Fever of 100.5 or higher on the day of the clinic)   No    2.  Have you ever had Guillain-[place] Syndrome within 6 weeks of an influenza vaccionation?  No    3. Do you have a life-threatening allergy to eggs?  No    4. Do you have a life-threatening allergy to a component of the vaccine? May include antibiotics, gelatin or latex.  No     5. Have you ever had a reaction to a dose of flu vaccine that needed immediate medical attention?  No     Form completed by Judi Todd. MA

## 2017-09-12 ENCOUNTER — OFFICE VISIT (OUTPATIENT)
Dept: FAMILY MEDICINE | Facility: CLINIC | Age: 62
End: 2017-09-12
Payer: MEDICARE

## 2017-09-12 ENCOUNTER — ALLIED HEALTH/NURSE VISIT (OUTPATIENT)
Dept: NURSING | Facility: CLINIC | Age: 62
End: 2017-09-12

## 2017-09-12 VITALS
BODY MASS INDEX: 36.51 KG/M2 | WEIGHT: 255 LBS | TEMPERATURE: 98.4 F | OXYGEN SATURATION: 98 % | RESPIRATION RATE: 13 BRPM | HEART RATE: 81 BPM | SYSTOLIC BLOOD PRESSURE: 122 MMHG | DIASTOLIC BLOOD PRESSURE: 70 MMHG | HEIGHT: 70 IN

## 2017-09-12 DIAGNOSIS — N18.30 TYPE 2 DIABETES MELLITUS WITH STAGE 3 CHRONIC KIDNEY DISEASE, WITH LONG-TERM CURRENT USE OF INSULIN (H): Primary | ICD-10-CM

## 2017-09-12 DIAGNOSIS — D51.0 PERNICIOUS ANEMIA: ICD-10-CM

## 2017-09-12 DIAGNOSIS — E11.22 TYPE 2 DIABETES MELLITUS WITH STAGE 3 CHRONIC KIDNEY DISEASE, WITH LONG-TERM CURRENT USE OF INSULIN (H): Primary | ICD-10-CM

## 2017-09-12 DIAGNOSIS — G89.4 CHRONIC PAIN SYNDROME: ICD-10-CM

## 2017-09-12 DIAGNOSIS — N18.30 CHRONIC KIDNEY DISEASE, STAGE III (MODERATE) (H): ICD-10-CM

## 2017-09-12 DIAGNOSIS — F41.9 ANXIETY: ICD-10-CM

## 2017-09-12 DIAGNOSIS — J45.20 INTERMITTENT ASTHMA, UNCOMPLICATED: ICD-10-CM

## 2017-09-12 DIAGNOSIS — Z23 NEED FOR PROPHYLACTIC VACCINATION AND INOCULATION AGAINST INFLUENZA: ICD-10-CM

## 2017-09-12 DIAGNOSIS — E78.5 HYPERLIPIDEMIA LDL GOAL <100: ICD-10-CM

## 2017-09-12 DIAGNOSIS — Z79.4 TYPE 2 DIABETES MELLITUS WITH STAGE 3 CHRONIC KIDNEY DISEASE, WITH LONG-TERM CURRENT USE OF INSULIN (H): Primary | ICD-10-CM

## 2017-09-12 LAB — HBA1C MFR BLD: 8.6 % (ref 4.3–6)

## 2017-09-12 PROCEDURE — G0008 ADMIN INFLUENZA VIRUS VAC: HCPCS | Performed by: FAMILY MEDICINE

## 2017-09-12 PROCEDURE — 99214 OFFICE O/P EST MOD 30 MIN: CPT | Mod: 25 | Performed by: FAMILY MEDICINE

## 2017-09-12 PROCEDURE — 36415 COLL VENOUS BLD VENIPUNCTURE: CPT | Performed by: FAMILY MEDICINE

## 2017-09-12 PROCEDURE — 90686 IIV4 VACC NO PRSV 0.5 ML IM: CPT | Performed by: FAMILY MEDICINE

## 2017-09-12 PROCEDURE — 83036 HEMOGLOBIN GLYCOSYLATED A1C: CPT | Performed by: FAMILY MEDICINE

## 2017-09-12 PROCEDURE — 99207 ZZC HEALTH COACHING, NO CHARGE: CPT

## 2017-09-12 RX ORDER — PREGABALIN 150 MG/1
150 CAPSULE ORAL 2 TIMES DAILY
Qty: 60 CAPSULE | Refills: 1 | Status: SHIPPED | OUTPATIENT
Start: 2017-09-12 | End: 2017-11-01

## 2017-09-12 RX ORDER — PEN NEEDLE, DIABETIC 31 GX3/16"
NEEDLE, DISPOSABLE MISCELLANEOUS
Qty: 100 EACH | Refills: 3 | Status: SHIPPED | OUTPATIENT
Start: 2017-09-12 | End: 2017-12-21

## 2017-09-12 RX ORDER — ALBUTEROL SULFATE 0.83 MG/ML
1 SOLUTION RESPIRATORY (INHALATION) EVERY 4 HOURS PRN
Qty: 25 VIAL | Refills: 1 | Status: SHIPPED | OUTPATIENT
Start: 2017-09-12 | End: 2018-08-20

## 2017-09-12 RX ORDER — CANAGLIFLOZIN 100 MG/1
100 TABLET, FILM COATED ORAL
Qty: 90 TABLET | Refills: 0 | Status: SHIPPED | OUTPATIENT
Start: 2017-09-12 | End: 2017-11-01

## 2017-09-12 RX ORDER — LIDOCAINE/PRILOCAINE 2.5 %-2.5%
CREAM (GRAM) TOPICAL PRN
Qty: 5800 G | Refills: 1 | Status: SHIPPED | OUTPATIENT
Start: 2017-09-12 | End: 2017-11-01

## 2017-09-12 RX ORDER — GLIMEPIRIDE 1 MG/1
TABLET ORAL
Qty: 180 TABLET | Refills: 0 | Status: SHIPPED | OUTPATIENT
Start: 2017-09-12 | End: 2017-09-26 | Stop reason: DRUGHIGH

## 2017-09-12 RX ORDER — BLOOD SUGAR DIAGNOSTIC
STRIP MISCELLANEOUS
Refills: 2 | Status: CANCELLED | OUTPATIENT
Start: 2017-09-12

## 2017-09-12 NOTE — MR AVS SNAPSHOT
After Visit Summary   9/12/2017    Sandra Dos Santos    MRN: [id number]           Patient Information     Date Of Birth          1955        Visit Information        Provider Department      9/12/2017 1:30 PM Deborah Leslie MD; [facility]        Today's Diagnoses     Chronic kidney disease, stage III (moderate)    -  1    Type 2 diabetes mellitus with stage 3 chronic kidney disease, with long-term current use of insulin (H)        Chronic pain syndrome        Hyperlipidemia LDL goal <100        Intermittent asthma, uncomplicated        Pernicious anemia        Anxiety          Care Instructions    [facility]    If you have any questions regarding to your visit please contact your care team:       Team Red:   Clinic Hours Telephone Number   Dr. Twyla Lucio  (pediatrics)  Paula Hutson NP 7am-7pm  Monday - Thursday   7am-5pm  Fridays  [phone] (fax)    Luis Alberto KAUR  [phone]   Urgent Care - [place] and [place] Monday-Friday  [place] - 11am-8pm  Saturday-Sunday  Both sites - 9am-5pm  [phone] - [facility]  [phone] - [place]       What options do I have for visits at the clinic other than the traditional office visit?  To expand how we care for you, many of our providers are utilizing electronic visits (e-visits) and telephone visits, when medically appropriate, for interactions with their patients rather than a visit in the clinic.   We also offer nurse visits for many medical concerns. Just like any other service, we will bill your insurance company for this type of visit based on time spent on the phone with your provider. Not all insurance companies cover these visits. Please check with your medical insurance if this type of visit is covered. You will be responsible for any charges that are not paid by your insurance.      E-visits via [employer]:  generally incur a $35.00  fee.  Telephone visits:  Time spent on the phone: *charged based on time that is spent on the phone in increments of 10 minutes. Estimated cost:   5-10 mins $30.00   11-20 mins. $59.00   21-30 mins. $85.00     As always, Thank you for trusting us with your health care needs!              Sertraline tablets  What is this medicine?  SERTRALINE (SER tra rob) is used to treat depression. It may also be used to treat obsessive compulsive disorder, panic disorder, post-trauma stress, premenstrual dysphoric disorder (PMDD) or social anxiety.  How should I use this medicine?  Take this medicine by mouth with a glass of water. Follow the directions on the prescription label. You can take it with or without food. Take your medicine at regular intervals. Do not take your medicine more often than directed. Do not stop taking this medicine suddenly except upon the advice of your doctor. Stopping this medicine too quickly may cause serious side effects or your condition may worsen.  A special MedGuide will be given to you by the pharmacist with each prescription and refill. Be sure to read this information carefully each time.  Talk to your pediatrician regarding the use of this medicine in children. While this drug may be prescribed for children as young as 7 years for selected conditions, precautions do apply.  What side effects may I notice from receiving this medicine?  Side effects that you should report to your doctor or health care professional as soon as possible:    allergic reactions like skin rash, itching or hives, swelling of the face, lips, or tongue    black or bloody stools, blood in the urine or vomit    fast, irregular heartbeat    feeling faint or lightheaded, falls    hallucination, loss of contact with reality    seizures    suicidal thoughts or other mood changes    unusual bleeding or bruising    unusually weak or tired    vomiting  Side effects that usually do not require medical attention (report to your  doctor or health care professional if they continue or are bothersome):    change in appetite    change in sex drive or performance    diarrhea    increased sweating    indigestion, nausea    tremors  What may interact with this medicine?  Do not take this medicine with any of the following medications:    certain medicines for fungal infections like fluconazole, itraconazole, ketoconazole, posaconazole, voriconazole    cisapride    disulfiram    dofetilide    linezolid    MAOIs like Carbex, Eldepryl, Marplan, Nardil, and Parnate    metronidazole    methylene blue (injected into a vein)    pimozide    thioridazine    ziprasidone  This medicine may also interact with the following medications:    alcohol    aspirin and aspirin-like medicines    certain medicines for depression, anxiety, or psychotic disturbances    certain medicines for irregular heart beat like flecainide, propafenone    certain medicines for migraine headaches like almotriptan, eletriptan, frovatriptan, naratriptan, rizatriptan, sumatriptan, zolmitriptan    certain medicines for sleep    certain medicines for seizures like carbamazepine, valproic acid, phenytoin    certain medicines that treat or prevent blood clots like warfarin, enoxaparin, dalteparin    cimetidine    digoxin    diuretics    fentanyl    furazolidone    isoniazid    lithium    NSAIDs, medicines for pain and inflammation, like ibuprofen or naproxen    other medicines that prolong the QT interval (cause an abnormal heart rhythm)    procarbazine    rasagiline    supplements like Celeste's wort, kava kava, valerian    tolbutamide    tramadol    tryptophan  What if I miss a dose?  If you miss a dose, take it as soon as you can. If it is almost time for your next dose, take only that dose. Do not take double or extra doses.  Where should I keep my medicine?  Keep out of the reach of children.  Store at room temperature between 15 and 30 degrees C (59 and 86 degrees F). Throw away any  unused medicine after the expiration date.  What should I tell my health care provider before I take this medicine?  They need to know if you have any of these conditions:    bipolar disorder or a family history of bipolar disorder    diabetes    glaucoma    heart disease    high blood pressure    history of irregular heartbeat    history of low levels of calcium, magnesium, or potassium in the blood    if you often drink alcohol    liver disease    receiving electroconvulsive therapy    seizures    suicidal thoughts, plans, or attempt; a previous suicide attempt by you or a family member    thyroid disease    an unusual or allergic reaction to sertraline, other medicines, foods, dyes, or preservatives    pregnant or trying to get pregnant    breast-feeding  What should I watch for while using this medicine?  Tell your doctor if your symptoms do not get better or if they get worse. Visit your doctor or health care professional for regular checks on your progress. Because it may take several weeks to see the full effects of this medicine, it is important to continue your treatment as prescribed by your doctor.  Patients and their families should watch out for new or worsening thoughts of suicide or depression. Also watch out for sudden changes in feelings such as feeling anxious, agitated, panicky, irritable, hostile, aggressive, impulsive, severely restless, overly excited and hyperactive, or not being able to sleep. If this happens, especially at the beginning of treatment or after a change in dose, call your health care professional.  You may get drowsy or dizzy. Do not drive, use machinery, or do anything that needs mental alertness until you know how this medicine affects you. Do not stand or sit up quickly, especially if you are an older patient. This reduces the risk of dizzy or fainting spells. Alcohol may interfere with the effect of this medicine. Avoid alcoholic drinks.  Your mouth may get dry. Chewing  "sugarless gum or sucking hard candy, and drinking plenty of water may help. Contact your doctor if the problem does not go away or is severe.  NOTE:This sheet is a summary. It may not cover all possible information. If you have questions about this medicine, talk to your doctor, pharmacist, or health care provider. Copyright  2017 [employer] Standard      Discharge MA Judi Perez  [facility]            Follow-ups after your visit        Who to contact     If you have questions or need follow up information about today's clinic visit or your schedule please contact [facility] directly at [phone].  Normal or non-critical lab and imaging results will be communicated to you by [employer], letter or phone within 4 business days after the clinic has received the results. If you do not hear from us within 7 days, please contact the clinic through [employer] or phone. If you have a critical or abnormal lab result, we will notify you by phone as soon as possible.  Submit refill requests through [employer] or call your pharmacy and they will forward the refill request to us. Please allow 3 business days for your refill to be completed.          Additional Information About Your Visit        [employer] Information     [employer] lets you send messages to your doctor, view your test results, renew your prescriptions, schedule appointments and more. To sign up, go to [email or web address] . Click on \"Log in\" on the left side of the screen, which will take you to the Welcome page. Then click on \"Sign up Now\" on the right side of the page.     You will be asked to enter the access code listed below, as well as some personal information. Please follow the directions to create your username and password.     Your access code is: [email or web address]  Expires: 2017  3:15 PM     Your access code will  in 90 days. If you need help or a new code, please call your [facility] or [phone].        Care EveryWhere ID     This is " "your Care EveryWhere ID. This could be used by other organizations to access your [facility] medical records  [id number]        Your Vitals Were     Pulse Temperature Respirations Height Pulse Oximetry BMI (Body Mass Index)    81 98.4  F (36.9  C) 13 5' 9.5\" (1.765 m) 98% 37.12 kg/m2       Blood Pressure from Last 3 Encounters:   09/12/17 122/70   08/01/17 132/78   07/03/17 124/77    Weight from Last 3 Encounters:   09/12/17 255 lb (115.7 kg)   08/01/17 256 lb (116.1 kg)   07/03/17 250 lb (113.4 kg)              We Performed the Following     Hemoglobin A1c          Today's Medication Changes          These changes are accurate as of: 9/12/17  3:15 PM.  If you have any questions, ask your nurse or doctor.               Start taking these medicines.        Dose/Directions    B-12 1000 MCG Tbcr   Used for:  Pernicious anemia   Started by:  Deborah Leslie MD        Dose:  1000 mcg   Take 1,000 mcg by mouth daily   Quantity:  100 tablet   Refills:  1       sertraline 50 MG tablet   Commonly known as:  ZOLOFT   Used for:  Anxiety   Started by:  Deborah Leslie MD        Take 1/2 tablet (25 mg) for 1-2 weeks, then increase to 1 tablet orally daily   Quantity:  30 tablet   Refills:  0         These medicines have changed or have updated prescriptions.        Dose/Directions    glimepiride 1 MG tablet   Commonly known as:  AMARYL   This may have changed:  See the new instructions.   Used for:  Type 2 diabetes mellitus with stage 3 chronic kidney disease, with long-term current use of insulin (H)   Changed by:  Deborah Leslie MD        TAKE 3 TABLETS BY MOUTH TWO TIMES A DAY WITH MEALS   Quantity:  180 tablet   Refills:  0       GLOBAL EASE INJECT PEN NEEDLES 31G X 5 MM   This may have changed:  additional instructions   Used for:  Type 2 diabetes mellitus with stage 3 chronic kidney disease, with long-term current use of insulin (H)   Generic drug:  insulin pen needle   Changed by:  Deborah Leslie MD        Use daily   Quantity: "  100 each   Refills:  3            Where to get your medicines      These medications were sent to [facility] - [place], [place] - [address]  [address] [place] [address]     Phone:  [phone]     albuterol (2.5 MG/3ML) 0.083% neb solution    B-12 1000 MCG Tbcr    glimepiride 1 MG tablet    GLOBAL EASE INJECT PEN NEEDLES 31G X 5 MM    insulin glargine U-300 300 UNIT/ML injection    INVOKANA 100 MG tablet    lidocaine-prilocaine cream    sertraline 50 MG tablet         Some of these will need a paper prescription and others can be bought over the counter.  Ask your nurse if you have questions.     Bring a paper prescription for each of these medications     pregabalin 150 MG capsule                Primary Care Provider    Physician No Ref-Primary       No address on file        Equal Access to Services     ELISEO RO : Jose Zhou, wakierra chacon, mulu kaalsuman meeks, araseli liao . So [facility] [phone].    ATENCIÓN: Si habla español, tiene a fletcher disposición servicios gratuitos de asistencia lingüística. Radha al [phone].    We comply with applicable federal civil rights laws and [place] laws. We do not discriminate on the basis of race, color, national origin, age, disability sex, sexual orientation or gender identity.            Thank you!     Thank you for choosing [facility]  for your care. Our goal is always to provide you with excellent care. Hearing back from our patients is one way we can continue to improve our services. Please take a few minutes to complete the written survey that you may receive in the mail after your visit with us. Thank you!             Your Updated Medication List - Protect others around you: Learn how to safely use, store and throw away your medicines at www.disposemymeds.org.          This list is accurate as of: 9/12/17  3:15 PM.  Always use your most recent med list.                    Brand Name Dispense Instructions for use Diagnosis    ACCU-CHEK ULISES PLUS test strip   Generic drug:  blood glucose monitoring           acetaminophen 500 MG tablet    TYLENOL     Take 500-1,000 mg by mouth every 6 hours as needed for mild pain        * albuterol 108 (90 BASE) MCG/ACT Inhaler    PROAIR HFA/PROVENTIL HFA/VENTOLIN HFA    1 Inhaler    Inhale 2 puffs into the lungs every 4 hours as needed for shortness of breath / dyspnea or wheezing    Intermittent asthma, uncomplicated       * albuterol (2.5 MG/3ML) 0.083% neb solution     25 vial    Take 1 vial (2.5 mg) by nebulization every 4 hours as needed for shortness of breath / dyspnea or wheezing    Intermittent asthma, uncomplicated       atorvastatin 10 MG tablet    LIPITOR    90 tablet    Take 1 tablet (10 mg) by mouth daily    Hyperlipidemia LDL goal <100       B-12 1000 MCG Tbcr     100 tablet    Take 1,000 mcg by mouth daily    Pernicious anemia       cholecalciferol 5000 UNITS Tabs tablet    vitamin D3     Take by mouth daily        glimepiride 1 MG tablet    AMARYL    180 tablet    TAKE 3 TABLETS BY MOUTH TWO TIMES A DAY WITH MEALS    Type 2 diabetes mellitus with stage 3 chronic kidney disease, with long-term current use of insulin (H)       GLOBAL EASE INJECT PEN NEEDLES 31G X 5 MM   Generic drug:  insulin pen needle     100 each    Use daily    Type 2 diabetes mellitus with stage 3 chronic kidney disease, with long-term current use of insulin (H)       insulin glargine U-300 300 UNIT/ML injection    TOUJEO SOLOSTAR    6 mL    Inject 16 Units Subcutaneous At Bedtime    Type 2 diabetes mellitus with stage 3 chronic kidney disease, with long-term current use of insulin (H)       INVOKANA 100 MG tablet   Generic drug:  canagliflozin     90 tablet    Take 1 tablet (100 mg) by mouth every morning (before breakfast)    Type 2 diabetes mellitus with stage 3 chronic kidney disease, with long-term current use of insulin (H)       lidocaine-prilocaine  cream    EMLA    5800 g    Apply topically as needed for moderate pain    Chronic pain syndrome       losartan 25 MG tablet    COZAAR    90 tablet    Take 1 tablet (25 mg) by mouth daily    Hypertension goal BP (blood pressure) < 140/90       * order for DME     400 each    Test strips for pt's glucometer, brand as covered by insurance. Test four times daily and prn.    Type 2 diabetes mellitus with stage 3 chronic kidney disease, with long-term current use of insulin (H)       * order for DME     400 each    Test strips for pt's glucometer, brand as covered by insurance. Test four times daily and prn.    Type 2 diabetes mellitus with stage 3 chronic kidney disease, with long-term current use of insulin (H)       * order for DME     400 each    Lancets.  Four times daily and prn.    Type 2 diabetes mellitus with stage 3 chronic kidney disease, with long-term current use of insulin (H)       pregabalin 150 MG capsule    LYRICA    60 capsule    Take 1 capsule (150 mg) by mouth 2 times daily    Chronic pain syndrome       ranitidine 150 MG tablet    ZANTAC    180 tablet    Take 1 tablet (150 mg) by mouth 2 times daily    Gastroesophageal reflux disease without esophagitis       sertraline 50 MG tablet    ZOLOFT    30 tablet    Take 1/2 tablet (25 mg) for 1-2 weeks, then increase to 1 tablet orally daily    Anxiety       tamsulosin 0.4 MG capsule    FLOMAX    90 capsule    Take 1 capsule (0.4 mg) by mouth daily    Benign prostatic hyperplasia, presence of lower urinary tract symptoms unspecified, unspecified morphology       thiamine 100 MG tablet     100 tablet    Take 1 tablet (100 mg) by mouth daily    Type 2 diabetes mellitus with stage 3 chronic kidney disease, with long-term current use of insulin (H)       traMADol 50 MG tablet    ULTRAM    120 tablet    Take 1-2 tablets ( mg) by mouth every 6 hours as needed for pain maximum 4 tablet(s) per day    Chronic pain syndrome       traZODone 50 MG tablet     DESYREL    25 tablet    TAKE ONE-HALF TO ONE TABLET EACH NIGHT AT BEDTIME    Insomnia, unspecified type       * Notice:  This list has 5 medication(s) that are the same as other medications prescribed for you. Read the directions carefully, and ask your doctor or other care provider to review them with you.

## 2017-09-12 NOTE — MR AVS SNAPSHOT
After Visit Summary   9/12/2017    Sandra Dos Santos    MRN: [id number]           Patient Information     Date Of Birth          1955        Visit Information        Provider Department      9/12/2017 2:30 PM MULTILINGUAL WORD; HEALTH  - [facility] 1 [facility]        Today's Diagnoses     Type 2 diabetes mellitus with stage 3 chronic kidney disease, with long-term current use of insulin (H)    -  1       Follow-ups after your visit        Your next 10 appointments already scheduled     Sep 26, 2017  2:30 PM CDT   Office Visit with Viri Yi [facility] ([facility])    [address] [place] [id number] [phone]           Bring a current list of meds and any records pertaining to this visit. For Physicals, please bring immunization records and any forms needing to be filled out. Please arrive 10 minutes early to complete paperwork.            Oct 24, 2017  2:00 PM CDT   Office Visit with Deborah Leslie MD   [facility] ([address] [id number] [phone]           Bring a current list of meds and any records pertaining to this visit. For Physicals, please bring immunization records and any forms needing to be filled out. Please arrive 10 minutes early to complete paperwork.              Who to contact     If you have questions or need follow up information about today's clinic visit or your schedule please contact [facility] directly at [phone].  Normal or non-critical lab and imaging results will be communicated to you by MyChart, letter or phone within 4 business days after the clinic has received the results. If you do not hear from us within 7 days, please contact the clinic through [email or web address] or phone. If you have a critical or abnormal lab result, we will notify you by phone as soon as possible.  Submit refill requests through Lookback or  "call your pharmacy and they will forward the refill request to us. Please allow 3 business days for your refill to be completed.          Additional Information About Your Visit        MyChart Information     [employer] lets you send messages to your doctor, view your test results, renew your prescriptions, schedule appointments and more. To sign up, go to [email or web address] . Click on \"Log in\" on the left side of the screen, which will take you to the Welcome page. Then click on \"Sign up Now\" on the right side of the page.     You will be asked to enter the access code listed below, as well as some personal information. Please follow the directions to create your username and password.     Your access code is: [email or web address]  Expires: 2017  3:15 PM     Your access code will  in 90 days. If you need help or a new code, please call your [facility] clinic or [phone].        Care EveryWhere ID     This is your Care EveryWhere ID. This could be used by other organizations to access your [facility] medical records  [id number]         Blood Pressure from Last 3 Encounters:   17 122/70   17 132/78   17 124/77    Weight from Last 3 Encounters:   17 255 lb (115.7 kg)   17 256 lb (116.1 kg)   17 250 lb (113.4 kg)              Today, you had the following     No orders found for display         Today's Medication Changes          These changes are accurate as of: 17 11:59 PM.  If you have any questions, ask your nurse or doctor.               Start taking these medicines.        Dose/Directions    B-12 1000 MCG Tbcr   Used for:  Pernicious anemia   Started by:  Deborah Leslie MD        Dose:  1000 mcg   Take 1,000 mcg by mouth daily   Quantity:  100 tablet   Refills:  1       sertraline 50 MG tablet   Commonly known as:  ZOLOFT   Used for:  Anxiety   Started by:  Deborah Leslie MD        Take 1/2 tablet (25 mg) for 1-2 weeks, then increase to 1 tablet orally daily "   Quantity:  30 tablet   Refills:  0         These medicines have changed or have updated prescriptions.        Dose/Directions    glimepiride 1 MG tablet   Commonly known as:  AMARYL   This may have changed:  See the new instructions.   Used for:  Type 2 diabetes mellitus with stage 3 chronic kidney disease, with long-term current use of insulin (H)   Changed by:  Deborah eLslie MD        TAKE 3 TABLETS BY MOUTH TWO TIMES A DAY WITH MEALS   Quantity:  180 tablet   Refills:  0       GLOBAL EASE INJECT PEN NEEDLES 31G X 5 MM   This may have changed:  additional instructions   Used for:  Type 2 diabetes mellitus with stage 3 chronic kidney disease, with long-term current use of insulin (H)   Generic drug:  insulin pen needle   Changed by:  Deborah Leslie MD        Use daily   Quantity:  100 each   Refills:  3            Where to get your medicines      These medications were sent to [facility] [place], [place] - [address]  [address] [place] [address]     Phone:  [phone]     albuterol (2.5 MG/3ML) 0.083% neb solution    B-12 1000 MCG Tbcr    glimepiride 1 MG tablet    GLOBAL EASE INJECT PEN NEEDLES 31G X 5 MM    insulin glargine U-300 300 UNIT/ML injection    INVOKANA 100 MG tablet    lidocaine-prilocaine cream    sertraline 50 MG tablet         Some of these will need a paper prescription and others can be bought over the counter.  Ask your nurse if you have questions.     Bring a paper prescription for each of these medications     pregabalin 150 MG capsule                Primary Care Provider    Physician No Ref-Primary       No address on file        Equal Access to Services     ELISEO RO AH: Jose Zhou, walisada ines, qaybta kaalmada araseli meeks. So [facility] [phone].    ATENCIÓN: Si habla español, tiene a fletcher disposición servicios gratuitos de asistencia lingüística. Llame al [phone].    We comply with applicable  federal civil rights laws and [place] laws. We do not discriminate on the basis of race, color, national origin, age, disability sex, sexual orientation or gender identity.            Thank you!     Thank you for choosing [facility]  for your care. Our goal is always to provide you with excellent care. Hearing back from our patients is one way we can continue to improve our services. Please take a few minutes to complete the written survey that you may receive in the mail after your visit with us. Thank you!             Your Updated Medication List - Protect others around you: Learn how to safely use, store and throw away your medicines at www.disposemymeds.org.          This list is accurate as of: 9/12/17 11:59 PM.  Always use your most recent med list.                   Brand Name Dispense Instructions for use Diagnosis    ACCU-CHEK ULISES PLUS test strip   Generic drug:  blood glucose monitoring           acetaminophen 500 MG tablet    TYLENOL     Take 500-1,000 mg by mouth every 6 hours as needed for mild pain        * albuterol 108 (90 BASE) MCG/ACT Inhaler    PROAIR HFA/PROVENTIL HFA/VENTOLIN HFA    1 Inhaler    Inhale 2 puffs into the lungs every 4 hours as needed for shortness of breath / dyspnea or wheezing    Intermittent asthma, uncomplicated       * albuterol (2.5 MG/3ML) 0.083% neb solution     25 vial    Take 1 vial (2.5 mg) by nebulization every 4 hours as needed for shortness of breath / dyspnea or wheezing    Intermittent asthma, uncomplicated       atorvastatin 10 MG tablet    LIPITOR    90 tablet    Take 1 tablet (10 mg) by mouth daily    Hyperlipidemia LDL goal <100       B-12 1000 MCG Tbcr     100 tablet    Take 1,000 mcg by mouth daily    Pernicious anemia       cholecalciferol 5000 UNITS Tabs tablet    vitamin D3     Take by mouth daily        glimepiride 1 MG tablet    AMARYL    180 tablet    TAKE 3 TABLETS BY MOUTH TWO TIMES A DAY WITH MEALS    Type 2 diabetes mellitus with  stage 3 chronic kidney disease, with long-term current use of insulin (H)       GLOBAL EASE INJECT PEN NEEDLES 31G X 5 MM   Generic drug:  insulin pen needle     100 each    Use daily    Type 2 diabetes mellitus with stage 3 chronic kidney disease, with long-term current use of insulin (H)       insulin glargine U-300 300 UNIT/ML injection    TOUJEO SOLOSTAR    6 mL    Inject 16 Units Subcutaneous At Bedtime    Type 2 diabetes mellitus with stage 3 chronic kidney disease, with long-term current use of insulin (H)       INVOKANA 100 MG tablet   Generic drug:  canagliflozin     90 tablet    Take 1 tablet (100 mg) by mouth every morning (before breakfast)    Type 2 diabetes mellitus with stage 3 chronic kidney disease, with long-term current use of insulin (H)       lidocaine-prilocaine cream    EMLA    5800 g    Apply topically as needed for moderate pain    Chronic pain syndrome       losartan 25 MG tablet    COZAAR    90 tablet    Take 1 tablet (25 mg) by mouth daily    Hypertension goal BP (blood pressure) < 140/90       * order for DME     400 each    Test strips for pt's glucometer, brand as covered by insurance. Test four times daily and prn.    Type 2 diabetes mellitus with stage 3 chronic kidney disease, with long-term current use of insulin (H)       * order for DME     400 each    Test strips for pt's glucometer, brand as covered by insurance. Test four times daily and prn.    Type 2 diabetes mellitus with stage 3 chronic kidney disease, with long-term current use of insulin (H)       * order for DME     400 each    Lancets.  Four times daily and prn.    Type 2 diabetes mellitus with stage 3 chronic kidney disease, with long-term current use of insulin (H)       pregabalin 150 MG capsule    LYRICA    60 capsule    Take 1 capsule (150 mg) by mouth 2 times daily    Chronic pain syndrome       ranitidine 150 MG tablet    ZANTAC    180 tablet    Take 1 tablet (150 mg) by mouth 2 times daily    Gastroesophageal  reflux disease without esophagitis       sertraline 50 MG tablet    ZOLOFT    30 tablet    Take 1/2 tablet (25 mg) for 1-2 weeks, then increase to 1 tablet orally daily    Anxiety       tamsulosin 0.4 MG capsule    FLOMAX    90 capsule    Take 1 capsule (0.4 mg) by mouth daily    Benign prostatic hyperplasia, presence of lower urinary tract symptoms unspecified, unspecified morphology       thiamine 100 MG tablet     100 tablet    Take 1 tablet (100 mg) by mouth daily    Type 2 diabetes mellitus with stage 3 chronic kidney disease, with long-term current use of insulin (H)       traMADol 50 MG tablet    ULTRAM    120 tablet    Take 1-2 tablets ( mg) by mouth every 6 hours as needed for pain maximum 4 tablet(s) per day    Chronic pain syndrome       traZODone 50 MG tablet    DESYREL    25 tablet    TAKE ONE-HALF TO ONE TABLET EACH NIGHT AT BEDTIME    Insomnia, unspecified type       * Notice:  This list has 5 medication(s) that are the same as other medications prescribed for you. Read the directions carefully, and ask your doctor or other care provider to review them with you.

## 2017-09-12 NOTE — NURSING NOTE
September 12, 2017    [facility]  [address]  [place] [address]  [phone]  Health Coaching Progress Note    Patient Name: Sandra Dos Santos Date: September 12, 2017      Session Length: 30      DATA    PRM Master Survey Scores Reviewed: Yes    Core Healthy Days Survey:         FERDINAND Score (Last Two) 8/18/2017   FERDINAND Raw Score 36   Activation Score 75.5   FERDINAND Level 4       PHQ-2 Score 8/18/2017 5/16/2017   PHQ-2 Total Score Interpretation - Positive if 3 or more points; Administer PHQ-9 if positive 0 0       PHQ-9 SCORE 9/15/2010 12/30/2016   Total Score 11 -   Total Score - 0       Treatment Objective(s) Addressed in This Session:  Target Behavior(s): diet/weight loss    Current Stressors / Issues:  Sandra continues to mention a lot of anxiety with some things going on in [place]    What Patient Does Well:   Noor mentions that he cut down a lot on his bread intake and has still been exercising  Previous Successes:   No new successes today  Areas in Need of Improvement:   Staceyor mentions that he is working on improving his anxiety at this point.  He notes that it has been hard to make healthy choices when he is stressed and anxious.  At our last appointment, patient requested a list of foods that are good and bad.  Writer created a list of foods that are good in moderation and foods to avoid most of the time. Sandra is going to use this to make healthy food choices  Barriers to Change:   Anxiety/stress  Reasons for Change:   Sandra would like to change to extend his lifespan  Plan/Goal for the Next 4 Weeks:   GOAL #1: Use Plate Planner when dishing up meals  GOAL #1 Progress Toward Goal: 0%    Intervention:  Motivational Interviewing    MI Intervention: Expressed Empathy/Understanding, Supported Autonomy, Collaboration, Evocation, Permission to raise concern or advise, Open-ended questions, Reflections: simple and complex, Change talk (evoked) and  Reframe     Change Talk Expressed by the Patient: Desire to change Ability to change Reasons to change Need to change Committment to change Activation Taking steps    Provider Response to Change Talk: E - Evoked more info from patient about behavior change, A - Affirmed patient's thoughts, decisions, or attempts at behavior change, R - Reflected patient's change talk and S - Summarized patient's change talk statements    Assessment / Progress on Treatment Objective(s) / Homework:  New Objective established this session - PREPARATION (Decided to change - considering how); Intervened by negotiating a change plan and determining options / strategies for behavior change, identifying triggers, exploring social supports, and working towards setting a date to begin behavior change         Plan: (Homework, other):  Patient was encouraged to continue to seek condition-related information and education, as well as schedule a follow up appointment with the Health  in 6 weeks. Patient has set self-identified goals and will monitor progress until the next appointment.  Health Coaching follow-up scheduled for October 24th at 3:00 pm at the [facility].      Mary Rowan, JOB, LD

## 2017-09-26 ENCOUNTER — OFFICE VISIT (OUTPATIENT)
Dept: PHARMACY | Facility: CLINIC | Age: 62
End: 2017-09-26
Payer: COMMERCIAL

## 2017-09-26 DIAGNOSIS — F41.9 ANXIETY: ICD-10-CM

## 2017-09-26 PROCEDURE — 99606 MTMS BY PHARM EST 15 MIN: CPT | Performed by: PHARMACIST

## 2017-09-26 PROCEDURE — 99607 MTMS BY PHARM ADDL 15 MIN: CPT | Performed by: PHARMACIST

## 2017-09-26 RX ORDER — ACETAMINOPHEN 325 MG/1
325-650 TABLET ORAL EVERY 6 HOURS PRN
COMMUNITY
End: 2017-12-21

## 2017-09-26 RX ORDER — GLIMEPIRIDE 4 MG/1
4 TABLET ORAL 2 TIMES DAILY
Qty: 180 TABLET | Refills: 0 | Status: SHIPPED | OUTPATIENT
Start: 2017-09-26 | End: 2017-11-01

## 2017-09-26 NOTE — PATIENT INSTRUCTIONS
Recommendations from today's MTM visit:                                                    MTM (medication therapy management) is a service provided by a clinical pharmacist designed to help you get the most of out of your medicines.   Today we reviewed what your medicines are for, how to know if they are working, that your medicines are safe and how to make your medicine regimen as easy as possible.     1.  Reduce Toujeo to 15 units daily.    2.  Increase glimepiride to 4mg twice a day.  I sent in a new prescription to your pharmacy.    Next MTM visit: Tuesday, October 10th at 2:30pm    To schedule another MTM appointment, please call the clinic directly or you may call the MTM scheduling line at [phone] or toll-free at [phone].     My Clinical Pharmacist's contact information:                                                      It was a pleasure seeing you today!  Please feel free to contact me with any questions or concerns you have.      Viri Yi, James, [facility]  Medication Therapy Management Provider  Pager: [phone]     You may receive a survey about the MTM services you received.  I would appreciate your feedback to help me serve you better in the future. Please fill it out and return it when you can. Your comments will be anonymous.

## 2017-09-26 NOTE — PROGRESS NOTES
SUBJECTIVE/OBJECTIVE:                Sandra Dos Santos is a 62 year old male coming in for a follow-up visit for Medication Therapy Management.  He was referred to me from Dr. Leslie.  He is joined by an  for our visit today.    Chief Complaint: Follow up from his visit on 17 with Gilmer Newman, CarleneD.  Here to f/up on diabetes management.    Tobacco: No tobacco use  Alcohol: none    Medication Adherence: no issues reported    Diabetes:  Pt currently taking Toujeo 18 units daily, Invokana 100mg daily and glimepiride 6mg daily. Pt is not experiencing side effects.  He doesn't feel like Toujeo is working for him at all.  Expresses concern about harmful effects of medications on his kidneys.  SMB-2 times daily.   Ranges (based on glucometer readings): 7 day avg 127mg/dL (n8) - 100, 92, 96, 160, 88, 107.  He tells me most of these are fasting readings.  He reports post-prandial readings (after dinner) are >200mg/dL  Patient experiences tingling in his hand and feet with hyperglycemia  Recent symptoms of high blood sugar? none  Eye exam: up to date  Foot exam: up to date  Microalbumin is < 30 mg/g. Pt is taking an ACEi/ARB.  Follows annually with nephrology  Aspirin: Not discussed again today  Diet/Exercise: He's been eating more salads and feels BG have improved as a result.  He's also limiting the amount of bread that he eats (about 1/4 as much as he used to).  He's also been trying to walk more.  He's working with health coaching.    Anxiety:  He's been started on sertarline, is now taking 50mg daily and he complains that it's not working.  He does note that his BG go up when he's feeling anxious.  He denies side effects since starting sertraline therapy.    Current labs include:BP Readings from Last 3 Encounters:   17 122/70   17 132/78   17 124/77     Today's Vitals: There were no vitals taken for this visit.     Lab Results   Component Value Date    A1C 8.6 2017   .  Lab Results    Component Value Date    CHOL 162 01/04/2017     Lab Results   Component Value Date    TRIG 197 01/04/2017     Lab Results   Component Value Date    HDL 50 01/04/2017     Lab Results   Component Value Date    LDL 73 01/04/2017       Liver Function Studies -   Recent Labs   Lab Test  11/29/16   1101   PROTTOTAL  7.0   ALBUMIN  3.6   BILITOTAL  0.4   ALKPHOS  94   AST  10   ALT  26       Lab Results   Component Value Date    UCRR 165 11/29/2016    MICROL 20 11/29/2016    UMALCR 12.00 11/29/2016       Last Basic Metabolic Panel:  Lab Results   Component Value Date     11/29/2016      Lab Results   Component Value Date    POTASSIUM 4.6 11/29/2016     Lab Results   Component Value Date    CHLORIDE 101 11/29/2016     Lab Results   Component Value Date    BUN 21 11/29/2016     Lab Results   Component Value Date    CR 1.22 11/29/2016     GFR Estimate   Date Value Ref Range Status   11/29/2016 60 (L) >60 mL/min/1.7m2 Final     Comment:     Non  GFR Calc   10/15/2012 >90 >60 mL/min/1.7m2 Final       TSH   Date Value Ref Range Status   11/29/2016 2.58 0.40 - 4.00 mU/L Final   ]    Most Recent Immunizations   Administered Date(s) Administered     HepB 03/08/2017     Influenza (IIV3) 08/01/2016     Influenza Vaccine IM 3yrs+ 4 Valent IIV4 09/12/2017     Pneumococcal 23 valent 12/02/2016     TDAP Vaccine (Adacel) 12/02/2016     Zoster vaccine, live 03/01/2015       ASSESSMENT:              Current medications were reviewed today as discussed above.      Medication Adherence: no issues identified    Diabetes: Needs Improvement.  A1c is not at goal <7%.  It sounds as though his fasting readings are at goal (or low) and post-prandial readings are high.  May benefit from reducing insulin somewhat, and increasing glimepiride and/or Invokana to aid with post-prandial readings.  Will start with glimepiride since he's concerned about kidney function.     Anxiety: Too soon to see full effect from SSRI therapy,  needed additional education regarding this.     PLAN:                1.  Reduced Toujeo to 15 units daily.  2.  Increased glimepiride to 4mg twice a day.    3.  Educated it'll take 6-8 weeks to see full effect of sertraline therapy.    I spent 40 minutes with this patient today. All changes were made via collaborative practice agreement with Dr. Leslie. A copy of the visit note was provided to the patient's primary care provider.     Will follow up in 2 weeks, appt scheduled.    The patient was given a summary of these recommendations as an after visit summary.  Viri Yi, CarleneD, [facility]  Medication Therapy Management Provider  Pager: [phone]

## 2017-09-26 NOTE — MR AVS SNAPSHOT
After Visit Summary   9/26/2017    Sandra Dos Santos    MRN: [id number]           Patient Information     Date Of Birth          1955        Visit Information        Provider Department      9/26/2017 2:15 PM Viri Yi, [facility]; MULTILINGUAL WORD [facility] MTM        Today's Diagnoses     Uncontrolled type 2 diabetes mellitus with stage 3 chronic kidney disease, with long-term current use of insulin (H)    -  1    Type 2 diabetes mellitus with stage 3 chronic kidney disease, with long-term current use of insulin (H)          Care Instructions    Recommendations from today's MTM visit:                                                    MTM (medication therapy management) is a service provided by a clinical pharmacist designed to help you get the most of out of your medicines.   Today we reviewed what your medicines are for, how to know if they are working, that your medicines are safe and how to make your medicine regimen as easy as possible.     1.  Reduce Toujeo to 15 units daily.    2.  Increase glimepiride to 4mg twice a day.  I sent in a new prescription to your pharmacy.    Next MTM visit: Tuesday, October 10th at 2:30pm    To schedule another MTM appointment, please call the clinic directly or you may call the MTM scheduling line at [phone] or toll-free at [phone].     My Clinical Pharmacist's contact information:                                                      It was a pleasure seeing you today!  Please feel free to contact me with any questions or concerns you have.      Viri Yi, PharmD, [facility]  Medication Therapy Management Provider  Pager: [phone]     You may receive a survey about the MTM services you received.  I would appreciate your feedback to help me serve you better in the future. Please fill it out and return it when you can. Your comments will be anonymous.                   Follow-ups after your visit        Your next 10 appointments already  scheduled     Oct 10, 2017  2:30 PM CDT   Office Visit with Viri Yi [facility] ([facility])    [address] [place] [id number] [phone]           Bring a current list of meds and any records pertaining to this visit. For Physicals, please bring immunization records and any forms needing to be filled out. Please arrive 10 minutes early to complete paperwork.            Oct 24, 2017  2:00 PM CDT   Office Visit with Deborah Leslie MD   [facility] ([facility])    [address] [place] [id number] [phone]           Bring a current list of meds and any records pertaining to this visit. For Physicals, please bring immunization records and any forms needing to be filled out. Please arrive 10 minutes early to complete paperwork.            Oct 24, 2017  3:00 PM CDT   Nurse Only with HEALTH  - REGION 1   [facility] ([address] [id number] [phone]              Who to contact     If you have questions or need follow up information about today's clinic visit or your schedule please contact [facility] directly at [phone].  Normal or non-critical lab and imaging results will be communicated to you by Stormpathhart, letter or phone within 4 business days after the clinic has received the results. If you do not hear from us within 7 days, please contact the clinic through Stormpathhart or phone. If you have a critical or abnormal lab result, we will notify you by phone as soon as possible.  Submit refill requests through Ctrip or call your pharmacy and they will forward the refill request to us. Please allow 3 business days for your refill to be completed.          Additional Information About Your Visit        Ctrip Information     Ctrip lets you send messages to your doctor, view your test results, renew your prescriptions,  "schedule appointments and more. To sign up, go to [email or web address] . Click on \"Log in\" on the left side of the screen, which will take you to the Welcome page. Then click on \"Sign up Now\" on the right side of the page.     You will be asked to enter the access code listed below, as well as some personal information. Please follow the directions to create your username and password.     Your access code is: [email or web address]  Expires: 2017  3:15 PM     Your access code will  in 90 days. If you need help or a new code, please call your [place] clinic or [phone].        Care EveryWhere ID     This is your Care EveryWhere ID. This could be used by other organizations to access your [place] medical records  [id number]         Blood Pressure from Last 3 Encounters:   17 122/70   17 132/78   17 124/77    Weight from Last 3 Encounters:   17 255 lb (115.7 kg)   17 256 lb (116.1 kg)   17 250 lb (113.4 kg)              Today, you had the following     No orders found for display         Today's Medication Changes          These changes are accurate as of: 17  3:00 PM.  If you have any questions, ask your nurse or doctor.               These medicines have changed or have updated prescriptions.        Dose/Directions    glimepiride 4 MG tablet   Commonly known as:  AMARYL   This may have changed:    - medication strength  - how much to take  - how to take this  - when to take this  - additional instructions   Used for:  Uncontrolled type 2 diabetes mellitus with stage 3 chronic kidney disease, with long-term current use of insulin (H)        Dose:  4 mg   Take 1 tablet (4 mg) by mouth 2 times daily   Quantity:  180 tablet   Refills:  0       insulin glargine U-300 300 UNIT/ML injection   Commonly known as:  TOUJEO SOLOSTAR   This may have changed:  how much to take   Used for:  Type 2 diabetes mellitus with stage 3 chronic kidney disease, with long-term current use " of insulin (H)        Dose:  15 Units   Inject 15 Units Subcutaneous At Bedtime   Quantity:  15 mL   Refills:  0            Where to get your medicines      These medications were sent to [facility]  [facility] - [place], [place] - [address]  [address], [place] [address]     Phone:  [phone]     glimepiride 4 MG tablet         Some of these will need a paper prescription and others can be bought over the counter.  Ask your nurse if you have questions.     You don't need a prescription for these medications     insulin glargine U-300 300 UNIT/ML injection                Primary Care Provider    Physician No Ref-Primary       No address on file        Equal Access to Services     [facility]: Hadii aad srikanth hadpao Zhou, waaxda tioadaha, qaybta kaalmada ricardoyavicenta, araseli liao . So [facility] [phone].    ATENCIÓN: Si habla español, tiene a fletcher disposición servicios gratuitos de asistencia lingüística. Llame al [phone].    We comply with applicable federal civil rights laws and [place] laws. We do not discriminate on the basis of race, color, national origin, age, disability sex, sexual orientation or gender identity.            Thank you!     Thank you for choosing [facility]  for your care. Our goal is always to provide you with excellent care. Hearing back from our patients is one way we can continue to improve our services. Please take a few minutes to complete the written survey that you may receive in the mail after your visit with us. Thank you!             Your Updated Medication List - Protect others around you: Learn how to safely use, store and throw away your medicines at www.disposemymeds.org.          This list is accurate as of: 9/26/17  3:00 PM.  Always use your most recent med list.                   Brand Name Dispense Instructions for use Diagnosis    acetaminophen 325 MG tablet    TYLENOL     Take 325-650 mg by mouth every 6 hours  as needed for mild pain        * albuterol 108 (90 BASE) MCG/ACT Inhaler    PROAIR HFA/PROVENTIL HFA/VENTOLIN HFA    1 Inhaler    Inhale 2 puffs into the lungs every 4 hours as needed for shortness of breath / dyspnea or wheezing    Intermittent asthma, uncomplicated       * albuterol (2.5 MG/3ML) 0.083% neb solution     25 vial    Take 1 vial (2.5 mg) by nebulization every 4 hours as needed for shortness of breath / dyspnea or wheezing    Intermittent asthma, uncomplicated       atorvastatin 10 MG tablet    LIPITOR    90 tablet    Take 1 tablet (10 mg) by mouth daily    Hyperlipidemia LDL goal <100       B-12 1000 MCG Tbcr     100 tablet    Take 1,000 mcg by mouth daily    Pernicious anemia       cholecalciferol 5000 UNITS Tabs tablet    vitamin D3     Take by mouth daily        glimepiride 4 MG tablet    AMARYL    180 tablet    Take 1 tablet (4 mg) by mouth 2 times daily    Uncontrolled type 2 diabetes mellitus with stage 3 chronic kidney disease, with long-term current use of insulin (H)       GLOBAL EASE INJECT PEN NEEDLES 31G X 5 MM   Generic drug:  insulin pen needle     100 each    Use daily    Type 2 diabetes mellitus with stage 3 chronic kidney disease, with long-term current use of insulin (H)       insulin glargine U-300 300 UNIT/ML injection    TOUJEO SOLOSTAR    15 mL    Inject 15 Units Subcutaneous At Bedtime    Type 2 diabetes mellitus with stage 3 chronic kidney disease, with long-term current use of insulin (H)       INVOKANA 100 MG tablet   Generic drug:  canagliflozin     90 tablet    Take 1 tablet (100 mg) by mouth every morning (before breakfast)    Type 2 diabetes mellitus with stage 3 chronic kidney disease, with long-term current use of insulin (H)       lidocaine-prilocaine cream    EMLA    5800 g    Apply topically as needed for moderate pain    Chronic pain syndrome       losartan 25 MG tablet    COZAAR    90 tablet    Take 1 tablet (25 mg) by mouth daily    Hypertension goal BP (blood  pressure) < 140/90       * order for DME     400 each    Test strips for pt's glucometer, brand as covered by insurance. Test four times daily and prn.    Type 2 diabetes mellitus with stage 3 chronic kidney disease, with long-term current use of insulin (H)       * order for DME     400 each    Lancets.  Four times daily and prn.    Type 2 diabetes mellitus with stage 3 chronic kidney disease, with long-term current use of insulin (H)       pregabalin 150 MG capsule    LYRICA    60 capsule    Take 1 capsule (150 mg) by mouth 2 times daily    Chronic pain syndrome       ranitidine 150 MG tablet    ZANTAC    180 tablet    Take 1 tablet (150 mg) by mouth 2 times daily    Gastroesophageal reflux disease without esophagitis       sertraline 50 MG tablet    ZOLOFT    30 tablet    Take 1/2 tablet (25 mg) for 1-2 weeks, then increase to 1 tablet orally daily    Anxiety       tamsulosin 0.4 MG capsule    FLOMAX    90 capsule    Take 1 capsule (0.4 mg) by mouth daily    Benign prostatic hyperplasia, presence of lower urinary tract symptoms unspecified, unspecified morphology       thiamine 100 MG tablet     100 tablet    Take 1 tablet (100 mg) by mouth daily    Type 2 diabetes mellitus with stage 3 chronic kidney disease, with long-term current use of insulin (H)       traMADol 50 MG tablet    ULTRAM    120 tablet    Take 1-2 tablets ( mg) by mouth every 6 hours as needed for pain maximum 4 tablet(s) per day    Chronic pain syndrome       traZODone 50 MG tablet    DESYREL    25 tablet    TAKE ONE-HALF TO ONE TABLET EACH NIGHT AT BEDTIME    Insomnia, unspecified type       * Notice:  This list has 4 medication(s) that are the same as other medications prescribed for you. Read the directions carefully, and ask your doctor or other care provider to review them with you.

## 2017-10-10 ENCOUNTER — OFFICE VISIT (OUTPATIENT)
Dept: PHARMACY | Facility: CLINIC | Age: 62
End: 2017-10-10
Payer: COMMERCIAL

## 2017-10-10 DIAGNOSIS — F41.9 ANXIETY: ICD-10-CM

## 2017-10-10 DIAGNOSIS — G47.00 INSOMNIA, UNSPECIFIED TYPE: ICD-10-CM

## 2017-10-10 PROCEDURE — 99606 MTMS BY PHARM EST 15 MIN: CPT | Performed by: PHARMACIST

## 2017-10-10 NOTE — MR AVS SNAPSHOT
After Visit Summary   10/10/2017    Sandra Dos Santos    MRN: [id number]           Patient Information     Date Of Birth          1955        Visit Information        Provider Department      10/10/2017 2:15 PM Viri Yi [facility]; MULTILINGUAL WORD [facility]        Today's Diagnoses     Uncontrolled type 2 diabetes mellitus with stage 3 chronic kidney disease, with long-term current use of insulin (H)    -  1    Anxiety           Follow-ups after your visit        Your next 10 appointments already scheduled     Oct 24, 2017  1:45 PM CDT   Office Visit with Deborah Leslie MD   [facility]    [address] [place] [address]   [phone]           Bring a current list of meds and any records pertaining to this visit. For Physicals, please bring immunization records and any forms needing to be filled out. Please arrive 10 minutes early to complete paperwork.            Oct 24, 2017  2:45 PM CDT   Nurse Only with HEALTH  - REGION 1   [facility] ([address] [address]   [phone]            Dec 12, 2017  2:30 PM CST   SHORT with Viri Yi RPH   [facility] ([address] [address] [phone]              Who to contact     If you have questions or need follow up information about today's clinic visit or your schedule please contact [facility] directly at [phone].  Normal or non-critical lab and imaging results will be communicated to you by MyChart, letter or phone within 4 business days after the clinic has received the results. If you do not hear from us within 7 days, please contact the clinic through MyChart or phone. If you have a critical or abnormal lab result, we will notify you by phone as soon as possible.  Submit refill requests through Cloudmarkhart or call  "your pharmacy and they will forward the refill request to us. Please allow 3 business days for your refill to be completed.          Additional Information About Your Visit        MyChart Information     ASSIAhart lets you send messages to your doctor, view your test results, renew your prescriptions, schedule appointments and more. To sign up, go to [email or web address] . Click on \"Log in\" on the left side of the screen, which will take you to the Welcome page. Then click on \"Sign up Now\" on the right side of the page.     You will be asked to enter the access code listed below, as well as some personal information. Please follow the directions to create your username and password.     Your access code is: [email or web address]  Expires: 2017  3:15 PM     Your access code will  in 90 days. If you need help or a new code, please call your [place] clinic or [phone].        Care EveryWhere ID     This is your Care EveryWhere ID. This could be used by other organizations to access your [place] medical records  [id number]         Blood Pressure from Last 3 Encounters:   17 122/70   17 132/78   17 124/77    Weight from Last 3 Encounters:   17 255 lb (115.7 kg)   17 256 lb (116.1 kg)   17 250 lb (113.4 kg)              Today, you had the following     No orders found for display         Today's Medication Changes          These changes are accurate as of: 10/10/17 11:59 PM.  If you have any questions, ask your nurse or doctor.               These medicines have changed or have updated prescriptions.        Dose/Directions    sertraline 50 MG tablet   Commonly known as:  ZOLOFT   This may have changed:    - how much to take  - how to take this  - when to take this  - additional instructions   Used for:  Anxiety   Changed by:  Viri Yi, [facility]        Dose:  50 mg   Take 1 tablet (50 mg) by mouth daily   Quantity:  30 tablet   Refills:  0       traZODone 50 MG tablet "   Commonly known as:  DESYREL   This may have changed:  See the new instructions.   Used for:  Insomnia, unspecified type   Changed by:  Deborah Leslie MD        TAKE ONE-HALF TO ONE TABLET BY MOUTH EACH NIGHT AT BEDTIME   Quantity:  25 tablet   Refills:  2            Where to get your medicines      These medications were sent to [facility], [place] - [address]  [address] [place] [address]     Phone:  [phone]     sertraline 50 MG tablet    traZODone 50 MG tablet                Primary Care Provider Office Phone # Fax #    Deborah Leslie [phone] [address]  [place] [address]        Equal Access to Services     [facility]: Hadii more duke hadasho Soomaali, waaxda luqadaha, qaybta kaalmada adeegyada, araseli liao . So [facility] [phone].    ATENCIÓN: Si habla español, tiene a fletcher disposición servicios gratuitos de asistencia lingüística. [facility] [phone].    We comply with applicable federal civil rights laws and [place] laws. We do not discriminate on the basis of race, color, national origin, age, disability, sex, sexual orientation, or gender identity.            Thank you!     Thank you for choosing [facility]  for your care. Our goal is always to provide you with excellent care. Hearing back from our patients is one way we can continue to improve our services. Please take a few minutes to complete the written survey that you may receive in the mail after your visit with us. Thank you!             Your Updated Medication List - Protect others around you: Learn how to safely use, store and throw away your medicines at www.disposemymeds.org.          This list is accurate as of: 10/10/17 11:59 PM.  Always use your most recent med list.                   Brand Name Dispense Instructions for use Diagnosis    acetaminophen 325 MG tablet    TYLENOL     Take 325-650 mg by mouth every 6 hours as  needed for mild pain        * albuterol 108 (90 BASE) MCG/ACT Inhaler    PROAIR HFA/PROVENTIL HFA/VENTOLIN HFA    1 Inhaler    Inhale 2 puffs into the lungs every 4 hours as needed for shortness of breath / dyspnea or wheezing    Intermittent asthma, uncomplicated       * albuterol (2.5 MG/3ML) 0.083% neb solution     25 vial    Take 1 vial (2.5 mg) by nebulization every 4 hours as needed for shortness of breath / dyspnea or wheezing    Intermittent asthma, uncomplicated       atorvastatin 10 MG tablet    LIPITOR    90 tablet    Take 1 tablet (10 mg) by mouth daily    Hyperlipidemia LDL goal <100       B-12 1000 MCG Tbcr     100 tablet    Take 1,000 mcg by mouth daily    Pernicious anemia       cholecalciferol 5000 UNITS Tabs tablet    vitamin D3     Take by mouth daily        glimepiride 4 MG tablet    AMARYL    180 tablet    Take 1 tablet (4 mg) by mouth 2 times daily    Uncontrolled type 2 diabetes mellitus with stage 3 chronic kidney disease, with long-term current use of insulin (H)       GLOBAL EASE INJECT PEN NEEDLES 31G X 5 MM   Generic drug:  insulin pen needle     100 each    Use daily    Type 2 diabetes mellitus with stage 3 chronic kidney disease, with long-term current use of insulin (H)       insulin glargine U-300 300 UNIT/ML injection    TOUJEO SOLOSTAR    15 mL    Inject 15 Units Subcutaneous At Bedtime        INVOKANA 100 MG tablet   Generic drug:  canagliflozin     90 tablet    Take 1 tablet (100 mg) by mouth every morning (before breakfast)    Type 2 diabetes mellitus with stage 3 chronic kidney disease, with long-term current use of insulin (H)       lidocaine-prilocaine cream    EMLA    5800 g    Apply topically as needed for moderate pain    Chronic pain syndrome       losartan 25 MG tablet    COZAAR    90 tablet    Take 1 tablet (25 mg) by mouth daily    Hypertension goal BP (blood pressure) < 140/90       * order for DME     400 each    Test strips for pt's glucometer, brand as covered by  insurance. Test four times daily and prn.    Type 2 diabetes mellitus with stage 3 chronic kidney disease, with long-term current use of insulin (H)       * order for DME     400 each    Lancets.  Four times daily and prn.    Type 2 diabetes mellitus with stage 3 chronic kidney disease, with long-term current use of insulin (H)       pregabalin 150 MG capsule    LYRICA    60 capsule    Take 1 capsule (150 mg) by mouth 2 times daily    Chronic pain syndrome       ranitidine 150 MG tablet    ZANTAC    180 tablet    Take 1 tablet (150 mg) by mouth 2 times daily    Gastroesophageal reflux disease without esophagitis       sertraline 50 MG tablet    ZOLOFT    30 tablet    Take 1 tablet (50 mg) by mouth daily    Anxiety       tamsulosin 0.4 MG capsule    FLOMAX    90 capsule    Take 1 capsule (0.4 mg) by mouth daily    Benign prostatic hyperplasia, presence of lower urinary tract symptoms unspecified, unspecified morphology       thiamine 100 MG tablet     100 tablet    Take 1 tablet (100 mg) by mouth daily    Type 2 diabetes mellitus with stage 3 chronic kidney disease, with long-term current use of insulin (H)       traMADol 50 MG tablet    ULTRAM    120 tablet    Take 1-2 tablets ( mg) by mouth every 6 hours as needed for pain maximum 4 tablet(s) per day    Chronic pain syndrome       traZODone 50 MG tablet    DESYREL    25 tablet    TAKE ONE-HALF TO ONE TABLET BY MOUTH EACH NIGHT AT BEDTIME    Insomnia, unspecified type       * Notice:  This list has 4 medication(s) that are the same as other medications prescribed for you. Read the directions carefully, and ask your doctor or other care provider to review them with you.

## 2017-10-10 NOTE — PROGRESS NOTES
SUBJECTIVE/OBJECTIVE:                Sandra Dos Santos is a 62 year old male coming in for a follow-up visit for Medication Therapy Management.  He was referred to me from Dr. Leslie.     Chief Complaint: Follow up from our visit on 17.  Here to f/up on diabetes management.    Tobacco: No tobacco use  Alcohol: none    Medication Adherence: no issues reported    Diabetes:  Pt currently taking Toujeo 15 units daily, Invokana 100mg daily and glimepiride 8mg daily. Pt is not experiencing side effects.  SMB-2 times daily.   Ranges (based on glucometer readings): 7 day avg 116mg/dL (n5)   AM: 109, 90, 97, 99, 110, 112, 101, 105  After lunch: 160  Before dinner: 90  HS: 184, 145  He feels numbness/tingling has improved and he's not having any s/s hypoglycemia.    Anxiety:  He continues taking sertraline 50mg daily, and reports anxiety sx are starting to improve somewhat.  Was asked to f/up with Dr. Leslie 6 weeks after starting tx, but was only given 30 tablets, so he's been without sertraline for the past 3 days.  He denies side effects of therapy.    Current labs include:BP Readings from Last 3 Encounters:   17 122/70   17 132/78   17 124/77     Lab Results   Component Value Date    A1C 8.6 2017   .  Lab Results   Component Value Date    CHOL 162 2017     Lab Results   Component Value Date    TRIG 197 2017     Lab Results   Component Value Date    HDL 50 2017     Lab Results   Component Value Date    LDL 73 2017       Liver Function Studies -   Recent Labs   Lab Test  16   1101   PROTTOTAL  7.0   ALBUMIN  3.6   BILITOTAL  0.4   ALKPHOS  94   AST  10   ALT  26       Lab Results   Component Value Date    UCRR 165 2016    MICROL 20 2016    UMALCR 12.00 2016       Last Basic Metabolic Panel:  Lab Results   Component Value Date     2016      Lab Results   Component Value Date    POTASSIUM 4.6 2016     Lab Results   Component Value  Date    CHLORIDE 101 11/29/2016     Lab Results   Component Value Date    BUN 21 11/29/2016     Lab Results   Component Value Date    CR 1.22 11/29/2016       GFR Estimate If Black   Date Value Ref Range Status   11/29/2016 73 >60 mL/min/1.7m2 Final     Comment:      GFR Calc   10/15/2012 >90 >60 mL/min/1.7m2 Final     TSH   Date Value Ref Range Status   11/29/2016 2.58 0.40 - 4.00 mU/L Final   ]    Most Recent Immunizations   Administered Date(s) Administered     HepB 03/08/2017     Influenza (IIV3) 08/01/2016     Influenza Vaccine IM 3yrs+ 4 Valent IIV4 09/12/2017     Pneumococcal 23 valent 12/02/2016     TDAP Vaccine (Adacel) 12/02/2016     Zoster vaccine, live 03/01/2015       ASSESSMENT:              Current medications were reviewed today as discussed above.      Medication Adherence: no issues identified    Diabetes: Improved. Patient is not meeting A1c goal of < 7%. Self monitoring of blood glucose is at goal of fasting  mg/dL and post prandial < 180 mg/dL.  No changes needed at this time.     Anxiety: Improved, needs refill of sertraline to have enough until PCP appt on 10/24.    PLAN:                1.  Continue current medication regimen.  2.  Refilled sertraline for another month.    I spent 15 minutes with this patient today. All changes were made via collaborative practice agreement with Deborah Leslie. A copy of the visit note was provided to the patient's primary care provider.     Will follow up in December.    The patient was given a summary of these recommendations as an after visit summary.  Viri Yi, CarleneD, [facility]  Medication Therapy Management Provider  Pager: [phone]

## 2017-10-11 ENCOUNTER — TELEPHONE (OUTPATIENT)
Dept: FAMILY MEDICINE | Facility: CLINIC | Age: 62
End: 2017-10-11

## 2017-10-11 DIAGNOSIS — Z00.00 PREVENTATIVE HEALTH CARE: Primary | ICD-10-CM

## 2017-10-11 RX ORDER — TRAZODONE HYDROCHLORIDE 50 MG/1
TABLET, FILM COATED ORAL
Qty: 25 TABLET | Refills: 2 | Status: SHIPPED | OUTPATIENT
Start: 2017-10-11 | End: 2017-11-01

## 2017-10-11 NOTE — TELEPHONE ENCOUNTER
Prescription approved per [facility] Refill Protocol.  Meggan Foreman, RN - BC

## 2017-10-11 NOTE — TELEPHONE ENCOUNTER
traZODone (DESYREL) 50 MG tablet   Last Written Prescription Date: 05/16/2017  Last Fill Quantity: 25,  # refills: 2   Last Office Visit with [facility], [facility] or  Health prescribing provider: 09/12/2017                                         Next 5 appointments (look out 90 days)     Oct 24, 2017  1:45 PM CDT   Office Visit with Deborah Leslie MD, MULTILINGUAL WORD   [facility]    [address] [place] [address]   [phone]            Oct 24, 2017  2:45 PM CDT   Nurse Only with HEALTH  - REGION 1, MULTILINGUAL WORD   [facility] ([facility])    [address] [place] [address]  [place] [address]   [phone]            Dec 12, 2017  2:30 PM CST   SHORT with Viri Yi [facility] ([facility])    [address] [place] [address]   [phone]                 Marcie Cruz MA

## 2017-10-11 NOTE — TELEPHONE ENCOUNTER
cholecalciferol (VITAMIN D3) 5000 UNITS TABS tablet      Last Written Prescription Date:    Last Fill Quantity: ,   # refills:   Last Office Visit with [facility], [facility] or  Health prescribing provider: 09/12/17  Future Office visit:    Next 5 appointments (look out 90 days)     Oct 24, 2017  1:45 PM CDT   Office Visit with Deborah Leslie MD, MULTILINGUAL WORD   [facility] ([facility])    [address] [place] [id number]   [phone]            Oct 24, 2017  2:45 PM CDT   Nurse Only with HEALTH  - REGION 1, MULTILINGUAL WORD   [facility] ([facility])    [address] [place] [address]  [place] [address]   [phone]            Dec 12, 2017  2:30 PM CST   SHORT with Viri Yi [facility] ([facility])    [address] [place] [address]   [phone]                   Routing refill request to provider for review/approval because:  Medication is reported/historical      Almaz Marie CMA

## 2017-10-13 NOTE — TELEPHONE ENCOUNTER
Notified pharmacy that Dr. Leslie wants patient to take 1000 units daily.  Parmacist verbalized understanding and has no further questions or concerns.    Meggan Foreman RN - BC

## 2017-10-13 NOTE — TELEPHONE ENCOUNTER
Routing refill request to provider for review/approval because:  Medication is reported/historical      Meggan Foreman RN - BC

## 2017-10-13 NOTE — TELEPHONE ENCOUNTER
He does not need 5000 u as last Vitamin D is Excellent  Please decrease to recommended 1000 u daily

## 2017-10-13 NOTE — TELEPHONE ENCOUNTER
Please clarify Vit D dose.  Patient was requesting 5000 IU, prescription was sent for 1000 IU    Dereje Saucedo RN

## 2017-10-13 NOTE — TELEPHONE ENCOUNTER
Reason for call:  Med question  Patient called regarding (reason for call): prescription- He is wondering if the Dr wants patient to have the vit D at 1000mg? He had requested 5000mg.  Additional comments: Please call to clarify      Phone number to reach patient:  [phone]    Best Time:  8-6    Can we leave a detailed message on this number?  YES

## 2017-10-24 NOTE — PATIENT INSTRUCTIONS
[facility]    If you have any questions regarding to your visit please contact your care team:       Team Red:   Clinic Hours Telephone Number   Dr. Twyla Lucio  (pediatrics)  Paula Hutson NP 7am-7pm  Monday - Thursday   7am-5pm  Fridays  [phone] (fax)    Luis Alberto KAUR  [phone]   Urgent [facility] - [place] and [place] Monday-Friday  [place] - 11am-8pm  Saturday-Sunday  Both sites - 9am-5pm  [phone] - [facility]  [phone] - [place]       What options do I have for visits at the clinic other than the traditional office visit?  To expand how we care for you, many of our providers are utilizing electronic visits (e-visits) and telephone visits, when medically appropriate, for interactions with their patients rather than a visit in the clinic.   We also offer nurse visits for many medical concerns. Just like any other service, we will bill your insurance company for this type of visit based on time spent on the phone with your provider. Not all insurance companies cover these visits. Please check with your medical insurance if this type of visit is covered. You will be responsible for any charges that are not paid by your insurance.      E-visits via [employer]:  generally incur a $35.00 fee.  Telephone visits:  Time spent on the phone: *charged based on time that is spent on the phone in increments of 10 minutes. Estimated cost:   5-10 mins $30.00   11-20 mins. $59.00   21-30 mins. $85.00     As always, Thank you for trusting us with your health care needs!    Cullen Madrid

## 2017-11-01 ENCOUNTER — APPOINTMENT (OUTPATIENT)
Dept: OPTOMETRY | Facility: CLINIC | Age: 62
End: 2017-11-01
Payer: MEDICARE

## 2017-11-01 ENCOUNTER — OFFICE VISIT (OUTPATIENT)
Dept: FAMILY MEDICINE | Facility: CLINIC | Age: 62
End: 2017-11-01
Payer: MEDICARE

## 2017-11-01 VITALS
HEIGHT: 70 IN | RESPIRATION RATE: 15 BRPM | HEART RATE: 79 BPM | OXYGEN SATURATION: 98 % | TEMPERATURE: 98 F | SYSTOLIC BLOOD PRESSURE: 112 MMHG | DIASTOLIC BLOOD PRESSURE: 62 MMHG | WEIGHT: 258 LBS | BODY MASS INDEX: 36.94 KG/M2

## 2017-11-01 DIAGNOSIS — K21.9 GASTROESOPHAGEAL REFLUX DISEASE WITHOUT ESOPHAGITIS: ICD-10-CM

## 2017-11-01 DIAGNOSIS — G47.00 INSOMNIA, UNSPECIFIED TYPE: ICD-10-CM

## 2017-11-01 DIAGNOSIS — F41.9 ANXIETY: ICD-10-CM

## 2017-11-01 DIAGNOSIS — E78.5 HYPERLIPIDEMIA LDL GOAL <100: ICD-10-CM

## 2017-11-01 DIAGNOSIS — E66.01 MORBID OBESITY DUE TO EXCESS CALORIES (H): ICD-10-CM

## 2017-11-01 DIAGNOSIS — J45.20 INTERMITTENT ASTHMA, UNCOMPLICATED: ICD-10-CM

## 2017-11-01 LAB
CREAT SERPL-MCNC: 1.21 MG/DL (ref 0.66–1.25)
CREAT UR-MCNC: 61 MG/DL
GFR SERPL CREATININE-BSD FRML MDRD: 61 ML/MIN/1.7M2
HBA1C MFR BLD: 7.9 % (ref 4.3–6)
MICROALBUMIN UR-MCNC: <5 MG/L
MICROALBUMIN/CREAT UR: NORMAL MG/G CR (ref 0–17)

## 2017-11-01 PROCEDURE — 82043 UR ALBUMIN QUANTITATIVE: CPT | Performed by: FAMILY MEDICINE

## 2017-11-01 PROCEDURE — 83036 HEMOGLOBIN GLYCOSYLATED A1C: CPT | Performed by: FAMILY MEDICINE

## 2017-11-01 PROCEDURE — 92340 FIT SPECTACLES MONOFOCAL: CPT | Performed by: OPTOMETRIST

## 2017-11-01 PROCEDURE — 82565 ASSAY OF CREATININE: CPT | Performed by: FAMILY MEDICINE

## 2017-11-01 PROCEDURE — 36415 COLL VENOUS BLD VENIPUNCTURE: CPT | Performed by: FAMILY MEDICINE

## 2017-11-01 PROCEDURE — 99214 OFFICE O/P EST MOD 30 MIN: CPT | Performed by: FAMILY MEDICINE

## 2017-11-01 RX ORDER — TRAZODONE HYDROCHLORIDE 50 MG/1
TABLET, FILM COATED ORAL
Qty: 25 TABLET | Refills: 2 | Status: SHIPPED | OUTPATIENT
Start: 2017-11-01 | End: 2018-02-21

## 2017-11-01 RX ORDER — GLIMEPIRIDE 4 MG/1
4 TABLET ORAL 2 TIMES DAILY
Qty: 180 TABLET | Refills: 0 | Status: SHIPPED | OUTPATIENT
Start: 2017-11-01 | End: 2018-03-20

## 2017-11-01 RX ORDER — LIDOCAINE/PRILOCAINE 2.5 %-2.5%
CREAM (GRAM) TOPICAL PRN
Qty: 5800 G | Refills: 1 | Status: SHIPPED | OUTPATIENT
Start: 2017-11-01

## 2017-11-01 RX ORDER — CANAGLIFLOZIN 100 MG/1
100 TABLET, FILM COATED ORAL
Qty: 90 TABLET | Refills: 0 | Status: SHIPPED | OUTPATIENT
Start: 2017-11-01 | End: 2018-08-07

## 2017-11-01 RX ORDER — ATORVASTATIN CALCIUM 10 MG/1
10 TABLET, FILM COATED ORAL DAILY
Qty: 90 TABLET | Refills: 2 | Status: SHIPPED | OUTPATIENT
Start: 2017-11-01 | End: 2018-08-02

## 2017-11-01 RX ORDER — PREGABALIN 150 MG/1
150 CAPSULE ORAL 2 TIMES DAILY
Qty: 60 CAPSULE | Refills: 1 | Status: SHIPPED | OUTPATIENT
Start: 2017-11-01 | End: 2017-12-21

## 2017-11-01 RX ORDER — TAMSULOSIN HYDROCHLORIDE 0.4 MG/1
0.4 CAPSULE ORAL DAILY
Qty: 90 CAPSULE | Refills: 3 | Status: CANCELLED | OUTPATIENT
Start: 2017-11-01

## 2017-11-01 RX ORDER — ALBUTEROL SULFATE 90 UG/1
2 AEROSOL, METERED RESPIRATORY (INHALATION) EVERY 4 HOURS PRN
Qty: 1 INHALER | Refills: 11 | Status: SHIPPED | OUTPATIENT
Start: 2017-11-01 | End: 2018-08-20

## 2017-11-01 NOTE — PROGRESS NOTES
SUBJECTIVE:   Sandra Dos Santos is a 62 year old male who presents to clinic today for the following health issues:      Diabetes Follow-up    Patient is checking blood sugars: twice daily.    Blood sugar testing frequency justification: TID   Did Not Bring Glucometer  Results are as follows:       am - 109       bedtime - 136  Afternoon -170-180        Diabetic concerns: None     Symptoms of hypoglycemia (low blood sugar): none     Paresthesias (numbness or burning in feet) or sores: Yes both     Date of last diabetic eye exam: 2017    Hyperlipidemia Follow-Up      Rate your low fat/cholesterol diet?: good    Taking statin?  Yes, no muscle aches from statin    Other lipid medications/supplements?:  none    Hypertension Follow-up      Outpatient blood pressures are not being checked.    Low Salt Diet: no added salt        Amount of exercise or physical activity: None    Problems taking medications regularly: No    Medication side effects: none    Diet: diabetic            Problem list and histories reviewed & adjusted, as indicated.  Additional history: as documented    Patient Active Problem List   Diagnosis     HYPERLIPIDEMIA LDL GOAL <100     Chronic kidney disease, stage III (moderate)     Gastroesophageal reflux disease     Hypothyroidism     Urinary hesitancy     Type 2 diabetes mellitus with stage 3 chronic kidney disease, with long-term current use of insulin (H)     Chronic pain syndrome     Morbid obesity due to excess calories (H)     Intermittent asthma, uncomplicated     Benign prostatic hyperplasia, presence of lower urinary tract symptoms unspecified, unspecified morphology     Gastroesophageal reflux disease without esophagitis     Hypertension goal BP (blood pressure) < 140/90     Lumbar stenosis     DDD (degenerative disc disease), lumbar     Osteoarthritis of lumbar spine, unspecified spinal osteoarthritis complication status     Facet arthropathy     Osteoarthritis of spine with radiculopathy,  lumbosacral region     Other spondylosis, lumbosacral region     Aneurysm of internal carotid artery     Uncontrolled type 2 diabetes mellitus with stage 3 chronic kidney disease, with long-term current use of insulin (H)     Anxiety     Past Surgical History:   Procedure Laterality Date     ARTHROSCOPY SHOULDER RT/LT Right      JOINT REPLACEMTN, KNEE RT/LT Bilateral 2013  ,2012    Joint Replacement knee RT/LT       Social History   Substance Use Topics     Smoking status: Never Smoker     Smokeless tobacco: Never Used     Alcohol use No     Family History   Problem Relation Age of Onset     DIABETES Father      Prostate Cancer No family hx of      Colon Cancer No family hx of      Breast Cancer No family hx of          Current Outpatient Prescriptions   Medication Sig Dispense Refill     glimepiride (AMARYL) 4 MG tablet Take 1 tablet (4 mg) by mouth 2 times daily 180 tablet 0     insulin glargine U-300 (TOUJEO SOLOSTAR) 300 UNIT/ML injection Inject 15 Units Subcutaneous At Bedtime 15 mL 0     pregabalin (LYRICA) 150 MG capsule Take 1 capsule (150 mg) by mouth 2 times daily 60 capsule 1     INVOKANA 100 MG tablet Take 1 tablet (100 mg) by mouth every morning (before breakfast) 90 tablet 0     lidocaine-prilocaine (EMLA) cream Apply topically as needed for moderate pain 5800 g 1     atorvastatin (LIPITOR) 10 MG tablet Take 1 tablet (10 mg) by mouth daily 90 tablet 2     ranitidine (ZANTAC) 150 MG tablet Take 1 tablet (150 mg) by mouth 2 times daily 180 tablet 3     albuterol (PROAIR HFA/PROVENTIL HFA/VENTOLIN HFA) 108 (90 BASE) MCG/ACT Inhaler Inhale 2 puffs into the lungs every 4 hours as needed for shortness of breath / dyspnea or wheezing 1 Inhaler 11     traZODone (DESYREL) 50 MG tablet TAKE ONE-HALF TO ONE TABLET BY MOUTH EACH NIGHT AT BEDTIME 25 tablet 2     cholecalciferol (VITAMIN D) 1000 UNIT tablet Take 1 tablet (1,000 Units) by mouth daily 100 tablet 3     [DISCONTINUED] traZODone (DESYREL) 50 MG tablet  TAKE ONE-HALF TO ONE TABLET BY MOUTH EACH NIGHT AT BEDTIME 25 tablet 2     sertraline (ZOLOFT) 50 MG tablet Take 1 tablet (50 mg) by mouth daily 30 tablet 0     acetaminophen (TYLENOL) 325 MG tablet Take 325-650 mg by mouth every 6 hours as needed for mild pain       [DISCONTINUED] glimepiride (AMARYL) 4 MG tablet Take 1 tablet (4 mg) by mouth 2 times daily 180 tablet 0     [DISCONTINUED] insulin glargine U-300 (TOUJEO SOLOSTAR) 300 UNIT/ML injection Inject 15 Units Subcutaneous At Bedtime 15 mL 0     GLOBAL EASE INJECT PEN NEEDLES 31G X 5 MM Use daily 100 each 3     albuterol (2.5 MG/3ML) 0.083% neb solution Take 1 vial (2.5 mg) by nebulization every 4 hours as needed for shortness of breath / dyspnea or wheezing 25 vial 1     Cyanocobalamin (B-12) 1000 MCG TBCR Take 1,000 mcg by mouth daily 100 tablet 1     [DISCONTINUED] pregabalin (LYRICA) 150 MG capsule Take 1 capsule (150 mg) by mouth 2 times daily 60 capsule 1     [DISCONTINUED] lidocaine-prilocaine (EMLA) cream Apply topically as needed for moderate pain 5800 g 1     thiamine 100 MG tablet Take 1 tablet (100 mg) by mouth daily 100 tablet 3     tamsulosin (FLOMAX) 0.4 MG capsule Take 1 capsule (0.4 mg) by mouth daily 90 capsule 3     order for DME Test strips for pt's glucometer, brand as covered by insurance. Test four times daily and prn. 400 each 4     order for DME Lancets.  Four times daily and prn. 400 each 4     [DISCONTINUED] atorvastatin (LIPITOR) 10 MG tablet Take 1 tablet (10 mg) by mouth daily 90 tablet 2     [DISCONTINUED] albuterol (PROAIR HFA/PROVENTIL HFA/VENTOLIN HFA) 108 (90 BASE) MCG/ACT Inhaler Inhale 2 puffs into the lungs every 4 hours as needed for shortness of breath / dyspnea or wheezing 1 Inhaler 11     traMADol (ULTRAM) 50 MG tablet Take 1-2 tablets ( mg) by mouth every 6 hours as needed for pain maximum 4 tablet(s) per day 120 tablet 1     losartan (COZAAR) 25 MG tablet Take 1 tablet (25 mg) by mouth daily 90 tablet 3      "cholecalciferol (VITAMIN D3) 5000 UNITS TABS tablet Take by mouth daily       Allergies   Allergen Reactions     Asa [Aspirin] Other (See Comments)     dizzy     Metformin GI Disturbance     Morphine Itching     Itching     Recent Labs   Lab Test  11/01/17   1455  09/12/17   1423  08/01/17   1341   01/04/17   0757  11/29/16   1101  10/15/12   1108  09/15/10   1232   A1C  7.9*  8.6*  8.4*   < >  7.8*  8.1*   --   7.3*   LDL   --    --    --    --   73  122*   --   77   HDL   --    --    --    --   50  45   --   51   TRIG   --    --    --    --   197*  226*   --   167*   ALT   --    --    --    --    --   26  18   --    CR   --    --    --    --    --   1.22  0.80   --    GFRESTIMATED   --    --    --    --    --   60*  >90   --    GFRESTBLACK   --    --    --    --    --   73  >90   --    POTASSIUM   --    --    --    --    --   4.6  4.2   --    TSH   --    --    --    --    --   2.58  1.16   --     < > = values in this interval not displayed.      BP Readings from Last 3 Encounters:   11/01/17 112/62   09/12/17 122/70   08/01/17 132/78    Wt Readings from Last 3 Encounters:   11/01/17 258 lb (117 kg)   09/12/17 255 lb (115.7 kg)   08/01/17 256 lb (116.1 kg)                  Labs reviewed in EPIC          [facility] and [facility]       Reviewed and updated as needed this visit by Provider         ROS:  C: NEGATIVE for fever, chills, change in weight  E/M: NEGATIVE for ear, mouth and throat problems  R: NEGATIVE for significant cough or SOB  CV: NEGATIVE for chest pain, palpitations or peripheral edema  ENDOCRINE: NEGATIVE for temperature intolerance, skin/hair changes  PSYCHIATRIC: pt did Not Like zoloft -he does not want any medicines for anxiety    OBJECTIVE:     /62  Pulse 79  Temp 98  F (36.7  C)  Resp 15  Ht 5' 9.5\" (1.765 m)  Wt 258 lb (117 kg)  SpO2 98%  BMI 37.55 kg/m2  Body mass index is 37.55 kg/(m^2).    EXAM:  /62  Pulse 79  Temp 98 " " F (36.7  C)  Resp 15  Ht 5' 9.5\" (1.765 m)  Wt 258 lb (117 kg)  SpO2 98%  BMI 37.55 kg/m2      GENERAL APPEARANCE: alert and no acute distress  PSYCH: mentation appears normal and affect normal/bright  RESP: lungs clear to auscultation - no rales, rhonchi or wheezes  CV: regular rate and rhythm, normal S1 S2, no S3 or S4 and no murmur, click or rub -  EXT: no cyanosis or edema in lower extremities  SKIN: no venous stasis changes  Foot Exam: no        Diagnostic Test Results:  Results for orders placed or performed in visit on 11/01/17 (from the past 24 hour(s))   Hemoglobin A1c   Result Value Ref Range    Hemoglobin A1C 7.9 (H) 4.3 - 6.0 %       ASSESSMENT/PLAN:         BMI:   Estimated body mass index is 37.55 kg/(m^2) as calculated from the following:    Height as of this encounter: 5' 9.5\" (1.765 m).    Weight as of this encounter: 258 lb (117 kg).   Weight management plan: low abigail diet/Exercise        1. Uncontrolled type 2 diabetes mellitus with stage 3 chronic kidney disease, with long-term current use of insulin (H)  Discussed that he needs to watch strict Diabetic diet  - glimepiride (AMARYL) 4 MG tablet; Take 1 tablet (4 mg) by mouth 2 times daily  Dispense: 180 tablet; Refill: 0  - insulin glargine U-300 (TOUJEO SOLOSTAR) 300 UNIT/ML injection; Inject 15 Units Subcutaneous At Bedtime  Dispense: 15 mL; Refill: 0  - Albumin Random Urine Quantitative with Creat Ratio  - Creatinine  Discussed consider meal Time Insulin as he tends to be Higher at Lunch  He needs too Bring Glucometer-he has appointment with MTM  He does not want to Increase Invokana    2. Anxiety  Pt declines medicines    3. Hyperlipidemia LDL goal <100  refilled  - atorvastatin (LIPITOR) 10 MG tablet; Take 1 tablet (10 mg) by mouth daily  Dispense: 90 tablet; Refill: 2    4. Gastroesophageal reflux disease without esophagitis  Stable   - ranitidine (ZANTAC) 150 MG tablet; Take 1 tablet (150 mg) by mouth 2 times daily  Dispense: 180 " tablet; Refill: 3    5. Intermittent asthma, uncomplicated  refilled  - albuterol (PROAIR HFA/PROVENTIL HFA/VENTOLIN HFA) 108 (90 BASE) MCG/ACT Inhaler; Inhale 2 puffs into the lungs every 4 hours as needed for shortness of breath / dyspnea or wheezing  Dispense: 1 Inhaler; Refill: 11    6. Insomnia, unspecified type  refilled  - traZODone (DESYREL) 50 MG tablet; TAKE ONE-HALF TO ONE TABLET BY MOUTH EACH NIGHT AT BEDTIME  Dispense: 25 tablet; Refill: 2    7. Morbid obesity due to excess calories (H)  As above          Deborah Leslie MD  [facility]

## 2017-11-01 NOTE — LETTER
[facility]  [address] [place] [address]    November 2, 2017    Sandra Dos Santos  [address] [facility] [address]          Dear Sandra,    Urine is normal   Take care    Enclosed is a copy of your results.     Results for orders placed or performed in visit on 11/01/17   Hemoglobin A1c   Result Value Ref Range    Hemoglobin A1C 7.9 (H) 4.3 - 6.0 %   Albumin Random Urine Quantitative with Creat Ratio   Result Value Ref Range    Creatinine Urine 61 mg/dL    Albumin Urine mg/L <5 mg/L    Albumin Urine mg/g Cr Unable to calculate due to low value 0 - 17 mg/g Cr   Creatinine   Result Value Ref Range    Creatinine 1.21 0.66 - 1.25 mg/dL    GFR Estimate 61 >60 mL/min/1.7m2    GFR Estimate If Black 73 >60 mL/min/1.7m2       If you have any questions or concerns, please call myself or my nurse at [phone].      Sincerely,        Deborah Leslie MD/ [facility]  Multilingual Word

## 2017-11-01 NOTE — MR AVS SNAPSHOT
After Visit Summary   11/1/2017    Sandra Dos Santos    MRN: [id number]           Patient Information     Date Of Birth          1955        Visit Information        Provider Department      11/1/2017 2:30 PM Deborah Leslie MD; MULTILINGUAL WORD [facility]        Today's Diagnoses     Anxiety    -  1    Uncontrolled type 2 diabetes mellitus with stage 3 chronic kidney disease, with long-term current use of insulin (H)        Chronic pain syndrome        Hyperlipidemia LDL goal <100        Gastroesophageal reflux disease without esophagitis        Intermittent asthma, uncomplicated        Insomnia, unspecified type          Care Instructions    [facility]    If you have any questions regarding to your visit please contact your care team:       Team Red:   Clinic Hours Telephone Number   Dr. Twyla Lucio  (pediatrics)  Paula Hutson NP 7am-7pm  Monday - Thursday   7am-5pm  Fridays  [phone] (fax)    Luis Alberto KAUR  [phone]   Urgent Care - [place] and [place] Monday-Friday  [place] - 11am-8pm  Saturday-Sunday  Both sites - 9am-5pm  [phone] - [facility]  [phone] - [place]       What options do I have for visits at the clinic other than the traditional office visit?  To expand how we care for you, many of our providers are utilizing electronic visits (e-visits) and telephone visits, when medically appropriate, for interactions with their patients rather than a visit in the clinic.   We also offer nurse visits for many medical concerns. Just like any other service, we will bill your insurance company for this type of visit based on time spent on the phone with your provider. Not all insurance companies cover these visits. Please check with your medical insurance if this type of visit is covered. You will be responsible for any charges that are not paid by your insurance.      E-visits via [employer]:   "generally incur a $35.00 fee.  Telephone visits:  Time spent on the phone: *charged based on time that is spent on the phone in increments of 10 minutes. Estimated cost:   5-10 mins $30.00   11-20 mins. $59.00   21-30 mins. $85.00     As always, Thank you for trusting us with your health care needs!    Cullen Madrid            Follow-ups after your visit        Your next 10 appointments already scheduled     Dec 12, 2017  2:30 PM CST   SHORT with Viri Yi [facility] ([facility])    [address] [id number] [phone]              Who to contact     If you have questions or need follow up information about today's clinic visit or your schedule please contact [facility] directly at [phone].  Normal or non-critical lab and imaging results will be communicated to you by MyChart, letter or phone within 4 business days after the clinic has received the results. If you do not hear from us within 7 days, please contact the clinic through MyChart or phone. If you have a critical or abnormal lab result, we will notify you by phone as soon as possible.  Submit refill requests through [employer] or call your pharmacy and they will forward the refill request to us. Please allow 3 business days for your refill to be completed.          Additional Information About Your Visit        [employer] Information     [employer] lets you send messages to your doctor, view your test results, renew your prescriptions, schedule appointments and more. To sign up, go to [email or web address] . Click on \"Log in\" on the left side of the screen, which will take you to the Welcome page. Then click on \"Sign up Now\" on the right side of the page.     You will be asked to enter the access code listed below, as well as some personal information. Please follow the directions to create your username and password.     Your access code is: [email or web address]  Expires: " "2017  3:15 PM     Your access code will  in 90 days. If you need help or a new code, please call your [facility] or [phone].        Care EveryWhere ID     This is your Care EveryWhere ID. This could be used by other organizations to access your [facility] medical records  [id number]        Your Vitals Were     Pulse Temperature Respirations Height Pulse Oximetry BMI (Body Mass Index)    79 98  F (36.7  C) 15 5' 9.5\" (1.765 m) 98% 37.55 kg/m2       Blood Pressure from Last 3 Encounters:   17 112/62   17 122/70   17 132/78    Weight from Last 3 Encounters:   17 258 lb (117 kg)   17 255 lb (115.7 kg)   17 256 lb (116.1 kg)              We Performed the Following     Albumin Random Urine Quantitative with Creat Ratio     Creatinine     Hemoglobin A1c          Today's Medication Changes          These changes are accurate as of: 17  3:29 PM.  If you have any questions, ask your nurse or doctor.               These medicines have changed or have updated prescriptions.        Dose/Directions    traZODone 50 MG tablet   Commonly known as:  DESYREL   This may have changed:  See the new instructions.   Used for:  Insomnia, unspecified type   Changed by:  Deborah Leslie MD        TAKE ONE-HALF TO ONE TABLET BY MOUTH EACH NIGHT AT BEDTIME   Quantity:  25 tablet   Refills:  2            Where to get your medicines      These medications were sent to [facility]  [place] - [address]  [address] [place] [address] [address]     Phone:  [phone]     albuterol 108 (90 BASE) MCG/ACT Inhaler    atorvastatin 10 MG tablet    glimepiride 4 MG tablet    insulin glargine U-300 300 UNIT/ML injection    INVOKANA 100 MG tablet    lidocaine-prilocaine cream    ranitidine 150 MG tablet    traZODone 50 MG tablet         Some of these will need a paper prescription and others can be bought over the counter.  Ask your nurse if you have questions.     Bring a " paper prescription for each of these medications     pregabalin 150 MG capsule                Primary Care Provider Office Phone # Fax #    Deborah Leslie [phone] [address] [facility] [address]        Equal Access to Services     ELISEO RO : Hadii more duke hadsiddharthao Soomaali, waaxda luqadaha, qaybta kaalmada adeblayneyada, araseli fantain hayaan norrisblayne bauer yanni fuchs. So [facility] [phone].    ATENCIÓN: Si habla español, tiene a fletcher disposición servicios gratuitos de asistencia lingüística. Llame al [phone].    We comply with applicable federal civil rights laws and [place] laws. We do not discriminate on the basis of race, color, national origin, age, disability, sex, sexual orientation, or gender identity.            Thank you!     Thank you for choosing [facility]  for your care. Our goal is always to provide you with excellent care. Hearing back from our patients is one way we can continue to improve our services. Please take a few minutes to complete the written survey that you may receive in the mail after your visit with us. Thank you!             Your Updated Medication List - Protect others around you: Learn how to safely use, store and throw away your medicines at www.disposemymeds.org.          This list is accurate as of: 11/1/17  3:29 PM.  Always use your most recent med list.                   Brand Name Dispense Instructions for use Diagnosis    acetaminophen 325 MG tablet    TYLENOL     Take 325-650 mg by mouth every 6 hours as needed for mild pain        * albuterol (2.5 MG/3ML) 0.083% neb solution     25 vial    Take 1 vial (2.5 mg) by nebulization every 4 hours as needed for shortness of breath / dyspnea or wheezing    Intermittent asthma, uncomplicated       * albuterol 108 (90 BASE) MCG/ACT Inhaler    PROAIR HFA/PROVENTIL HFA/VENTOLIN HFA    1 Inhaler    Inhale 2 puffs into the lungs every 4 hours as needed for shortness of breath / dyspnea or  wheezing    Intermittent asthma, uncomplicated       atorvastatin 10 MG tablet    LIPITOR    90 tablet    Take 1 tablet (10 mg) by mouth daily    Hyperlipidemia LDL goal <100       B-12 1000 MCG Tbcr     100 tablet    Take 1,000 mcg by mouth daily    Pernicious anemia       * cholecalciferol 5000 UNITS Tabs tablet    vitamin D3     Take by mouth daily        * cholecalciferol 1000 UNIT tablet    vitamin D3    100 tablet    Take 1 tablet (1,000 Units) by mouth daily    Preventative health care       glimepiride 4 MG tablet    AMARYL    180 tablet    Take 1 tablet (4 mg) by mouth 2 times daily    Uncontrolled type 2 diabetes mellitus with stage 3 chronic kidney disease, with long-term current use of insulin (H)       GLOBAL EASE INJECT PEN NEEDLES 31G X 5 MM   Generic drug:  insulin pen needle     100 each    Use daily    Type 2 diabetes mellitus with stage 3 chronic kidney disease, with long-term current use of insulin (H)       insulin glargine U-300 300 UNIT/ML injection    TOUJEO SOLOSTAR    15 mL    Inject 15 Units Subcutaneous At Bedtime    Uncontrolled type 2 diabetes mellitus with stage 3 chronic kidney disease, with long-term current use of insulin (H)       INVOKANA 100 MG tablet   Generic drug:  canagliflozin     90 tablet    Take 1 tablet (100 mg) by mouth every morning (before breakfast)        lidocaine-prilocaine cream    EMLA    5800 g    Apply topically as needed for moderate pain    Chronic pain syndrome       losartan 25 MG tablet    COZAAR    90 tablet    Take 1 tablet (25 mg) by mouth daily    Hypertension goal BP (blood pressure) < 140/90       * order for DME     400 each    Test strips for pt's glucometer, brand as covered by insurance. Test four times daily and prn.    Type 2 diabetes mellitus with stage 3 chronic kidney disease, with long-term current use of insulin (H)       * order for DME     400 each    Lancets.  Four times daily and prn.    Type 2 diabetes mellitus with stage 3 chronic  kidney disease, with long-term current use of insulin (H)       pregabalin 150 MG capsule    LYRICA    60 capsule    Take 1 capsule (150 mg) by mouth 2 times daily    Chronic pain syndrome       ranitidine 150 MG tablet    ZANTAC    180 tablet    Take 1 tablet (150 mg) by mouth 2 times daily    Gastroesophageal reflux disease without esophagitis       sertraline 50 MG tablet    ZOLOFT    30 tablet    Take 1 tablet (50 mg) by mouth daily    Anxiety       tamsulosin 0.4 MG capsule    FLOMAX    90 capsule    Take 1 capsule (0.4 mg) by mouth daily    Benign prostatic hyperplasia, presence of lower urinary tract symptoms unspecified, unspecified morphology       thiamine 100 MG tablet     100 tablet    Take 1 tablet (100 mg) by mouth daily    Type 2 diabetes mellitus with stage 3 chronic kidney disease, with long-term current use of insulin (H)       traMADol 50 MG tablet    ULTRAM    120 tablet    Take 1-2 tablets ( mg) by mouth every 6 hours as needed for pain maximum 4 tablet(s) per day    Chronic pain syndrome       traZODone 50 MG tablet    DESYREL    25 tablet    TAKE ONE-HALF TO ONE TABLET BY MOUTH EACH NIGHT AT BEDTIME    Insomnia, unspecified type       * Notice:  This list has 6 medication(s) that are the same as other medications prescribed for you. Read the directions carefully, and ask your doctor or other care provider to review them with you.

## 2017-12-14 ENCOUNTER — OFFICE VISIT (OUTPATIENT)
Dept: FAMILY MEDICINE | Facility: CLINIC | Age: 62
End: 2017-12-14
Payer: MEDICARE

## 2017-12-14 VITALS
HEIGHT: 70 IN | HEART RATE: 78 BPM | TEMPERATURE: 98.7 F | BODY MASS INDEX: 35.79 KG/M2 | WEIGHT: 250 LBS | DIASTOLIC BLOOD PRESSURE: 72 MMHG | SYSTOLIC BLOOD PRESSURE: 130 MMHG | OXYGEN SATURATION: 95 %

## 2017-12-14 DIAGNOSIS — N18.30 TYPE 2 DIABETES MELLITUS WITH STAGE 3 CHRONIC KIDNEY DISEASE, WITH LONG-TERM CURRENT USE OF INSULIN (H): ICD-10-CM

## 2017-12-14 DIAGNOSIS — Z79.4 TYPE 2 DIABETES MELLITUS WITH STAGE 3 CHRONIC KIDNEY DISEASE, WITH LONG-TERM CURRENT USE OF INSULIN (H): ICD-10-CM

## 2017-12-14 DIAGNOSIS — E11.22 TYPE 2 DIABETES MELLITUS WITH STAGE 3 CHRONIC KIDNEY DISEASE, WITH LONG-TERM CURRENT USE OF INSULIN (H): ICD-10-CM

## 2017-12-14 DIAGNOSIS — J06.9 UPPER RESPIRATORY TRACT INFECTION, UNSPECIFIED TYPE: ICD-10-CM

## 2017-12-14 DIAGNOSIS — J45.21 MILD INTERMITTENT ASTHMA WITH EXACERBATION: Primary | ICD-10-CM

## 2017-12-14 PROCEDURE — 99214 OFFICE O/P EST MOD 30 MIN: CPT | Performed by: FAMILY MEDICINE

## 2017-12-14 RX ORDER — BENZONATATE 200 MG/1
200 CAPSULE ORAL 3 TIMES DAILY PRN
Qty: 21 CAPSULE | Refills: 0 | Status: SHIPPED | OUTPATIENT
Start: 2017-12-14 | End: 2018-02-21

## 2017-12-14 RX ORDER — PREDNISONE 10 MG/1
TABLET ORAL
Qty: 30 TABLET | Refills: 0 | Status: SHIPPED | OUTPATIENT
Start: 2017-12-14 | End: 2018-02-21

## 2017-12-14 NOTE — PROGRESS NOTES
SUBJECTIVE:   Sandra Dos Santos is a 62 year old male who presents to clinic today for the following health issues:      RESPIRATORY SYMPTOMS      Duration: 4-5 days    Description  nasal congestion, cough, green mucus, wheezing  Pt has Known asthma and using albuterol q 6 hours    Severity: severe high after dinner    History (predisposing factors):  asthma    Accompanying signs and symptoms: blood sugars have been    Precipitating or alleviating factors: None    Therapies tried and outcome:  Cough syrup and inhaler not helping            Problem list and histories reviewed & adjusted, as indicated.  Additional history: as documented    Patient Active Problem List   Diagnosis     HYPERLIPIDEMIA LDL GOAL <100     Chronic kidney disease, stage III (moderate)     Gastroesophageal reflux disease     Hypothyroidism     Urinary hesitancy     Type 2 diabetes mellitus with stage 3 chronic kidney disease, with long-term current use of insulin (H)     Chronic pain syndrome     Morbid obesity due to excess calories (H)     Intermittent asthma, uncomplicated     Benign prostatic hyperplasia, presence of lower urinary tract symptoms unspecified, unspecified morphology     Gastroesophageal reflux disease without esophagitis     Hypertension goal BP (blood pressure) < 140/90     Lumbar stenosis     DDD (degenerative disc disease), lumbar     Osteoarthritis of lumbar spine, unspecified spinal osteoarthritis complication status     Facet arthropathy     Osteoarthritis of spine with radiculopathy, lumbosacral region     Other spondylosis, lumbosacral region     Aneurysm of internal carotid artery     Uncontrolled type 2 diabetes mellitus with stage 3 chronic kidney disease, with long-term current use of insulin (H)     Anxiety     Past Surgical History:   Procedure Laterality Date     ARTHROSCOPY SHOULDER RT/LT Right      JOINT REPLACEMTN, KNEE RT/LT Bilateral 2013  ,2012    Joint Replacement knee RT/LT       Social History    Substance Use Topics     Smoking status: Never Smoker     Smokeless tobacco: Never Used     Alcohol use No     Family History   Problem Relation Age of Onset     DIABETES Father      Prostate Cancer No family hx of      Colon Cancer No family hx of      Breast Cancer No family hx of          Current Outpatient Prescriptions   Medication Sig Dispense Refill     predniSONE (DELTASONE) 10 MG tablet 40 mg daily x 3 days, 30 mg daily x 3 days, 20 mg daily x 3 days ,10 mg daily x 3 days 30 tablet 0     benzonatate (TESSALON) 200 MG capsule Take 1 capsule (200 mg) by mouth 3 times daily as needed for cough 21 capsule 0     sertraline (ZOLOFT) 50 MG tablet TAKE ONE TABLET BY MOUTH EVERY DAY 30 tablet 11     glimepiride (AMARYL) 4 MG tablet Take 1 tablet (4 mg) by mouth 2 times daily 180 tablet 0     insulin glargine U-300 (TOUJEO SOLOSTAR) 300 UNIT/ML injection Inject 15 Units Subcutaneous At Bedtime 15 mL 0     pregabalin (LYRICA) 150 MG capsule Take 1 capsule (150 mg) by mouth 2 times daily 60 capsule 1     INVOKANA 100 MG tablet Take 1 tablet (100 mg) by mouth every morning (before breakfast) 90 tablet 0     lidocaine-prilocaine (EMLA) cream Apply topically as needed for moderate pain 5800 g 1     atorvastatin (LIPITOR) 10 MG tablet Take 1 tablet (10 mg) by mouth daily 90 tablet 2     ranitidine (ZANTAC) 150 MG tablet Take 1 tablet (150 mg) by mouth 2 times daily 180 tablet 3     albuterol (PROAIR HFA/PROVENTIL HFA/VENTOLIN HFA) 108 (90 BASE) MCG/ACT Inhaler Inhale 2 puffs into the lungs every 4 hours as needed for shortness of breath / dyspnea or wheezing 1 Inhaler 11     traZODone (DESYREL) 50 MG tablet TAKE ONE-HALF TO ONE TABLET BY MOUTH EACH NIGHT AT BEDTIME 25 tablet 2     cholecalciferol (VITAMIN D) 1000 UNIT tablet Take 1 tablet (1,000 Units) by mouth daily 100 tablet 3     acetaminophen (TYLENOL) 325 MG tablet Take 325-650 mg by mouth every 6 hours as needed for mild pain       GLOBAL EASE INJECT PEN NEEDLES  31G X 5 MM Use daily 100 each 3     albuterol (2.5 MG/3ML) 0.083% neb solution Take 1 vial (2.5 mg) by nebulization every 4 hours as needed for shortness of breath / dyspnea or wheezing 25 vial 1     Cyanocobalamin (B-12) 1000 MCG TBCR Take 1,000 mcg by mouth daily 100 tablet 1     thiamine 100 MG tablet Take 1 tablet (100 mg) by mouth daily 100 tablet 3     tamsulosin (FLOMAX) 0.4 MG capsule Take 1 capsule (0.4 mg) by mouth daily 90 capsule 3     order for DME Test strips for pt's glucometer, brand as covered by insurance. Test four times daily and prn. 400 each 4     order for DME Lancets.  Four times daily and prn. 400 each 4     traMADol (ULTRAM) 50 MG tablet Take 1-2 tablets ( mg) by mouth every 6 hours as needed for pain maximum 4 tablet(s) per day 120 tablet 1     losartan (COZAAR) 25 MG tablet Take 1 tablet (25 mg) by mouth daily 90 tablet 3     cholecalciferol (VITAMIN D3) 5000 UNITS TABS tablet Take by mouth daily       Allergies   Allergen Reactions     Asa [Aspirin] Other (See Comments)     dizzy     Metformin GI Disturbance     Morphine Itching     Itching     Recent Labs   Lab Test  11/01/17   1455  09/12/17   1423  08/01/17   1341   01/04/17   0757  11/29/16   1101  10/15/12   1108   09/15/10   1232   A1C  7.9*  8.6*  8.4*   < >  7.8*  8.1*   --    --   7.3*   LDL   --    --    --    --   73  122*   --    --   77   HDL   --    --    --    --   50  45   --    --   51   TRIG   --    --    --    --   197*  226*   --    --   167*   ALT   --    --    --    --    --   26  18   --    --    CR  1.21   --    --    --    --   1.22  0.80   < >   --    GFRESTIMATED  61   --    --    --    --   60*  >90   < >   --    GFRESTBLACK  73   --    --    --    --   73  >90   < >   --    POTASSIUM   --    --    --    --    --   4.6  4.2   --    --    TSH   --    --    --    --    --   2.58  1.16   --    --     < > = values in this interval not displayed.      BP Readings from Last 3 Encounters:   12/14/17 130/72  "  11/01/17 112/62   09/12/17 122/70    Wt Readings from Last 3 Encounters:   12/14/17 250 lb (113.4 kg)   11/01/17 258 lb (117 kg)   09/12/17 255 lb (115.7 kg)                  Labs reviewed in EPIC          Reviewed and updated as needed this visit by clinical staff     Reviewed and updated as needed this visit by Provider         ROS:  C: NEGATIVE for fever, chills, change in weight  INTEGUMENTARY/SKIN: NEGATIVE for worrisome rashes, moles or lesions  ENT/MOUTH: as above  RESP:cough nonprodutive ,wheezing  CV: NEGATIVE for chest pain, palpitations or peripheral edema  GI: NEGATIVE for nausea, abdominal pain, heartburn, or change in bowel habits  ROS otherwise negative    OBJECTIVE:     /72 (BP Location: Right arm, Patient Position: Chair, Cuff Size: Adult Large)  Pulse 78  Temp 98.7  F (37.1  C) (Oral)  Ht 5' 9.5\" (1.765 m)  Wt 250 lb (113.4 kg)  SpO2 95%  BMI 36.39 kg/m2  Body mass index is 36.39 kg/(m^2).  GENERAL: healthy, alert and no distress  EYES: Eyes grossly normal to inspection, PERRL and conjunctivae and sclerae normal  HENT: ear canals and TM's normal, nose and mouth without ulcers or lesions  NECK: no adenopathy, no asymmetry, masses, or scars and thyroid normal to palpation  RESP: expiratory wheezes throughout,no rales or rhonchiCV: regular rate and rhythm, normal S1 S2, no S3 or S4, no murmur, click or rub, no peripheral edema and peripheral pulses strong  ABDOMEN: soft, nontender, no hepatosplenomegaly, no masses and bowel sounds normal  MS: no gross musculoskeletal defects noted, no edema    Diagnostic Test Results:  none     ASSESSMENT/PLAN:           1. Mild intermittent asthma with exacerbation  SEE [facility] care orders  The potential side effects of this medication have been discussed with the patient.  Call if any significant problems with these are experienced.  Advised use albuterol q2-4 hours as needed  Follow up 1 week-sooner if worse  - predniSONE (DELTASONE) 10 MG tablet; 40 mg " daily x 3 days, 30 mg daily x 3 days, 20 mg daily x 3 days ,10 mg daily x 3 days  Dispense: 30 tablet; Refill: 0  - benzonatate (TESSALON) 200 MG capsule; Take 1 capsule (200 mg) by mouth 3 times daily as needed for cough  Dispense: 21 capsule; Refill: 0    2. Type 2 diabetes mellitus with stage 3 chronic kidney disease, with long-term current use of insulin (H)  Pt is on Insulin    3. Upper respiratory tract infection, unspecified type      Deborah Leslie MD  [facility]

## 2017-12-14 NOTE — PATIENT INSTRUCTIONS
[facility]    If you have any questions regarding to your visit please contact your care team:       Team Red:   Clinic Hours Telephone Number   Dr. Twyla Lucio  (pediatrics)  Paula Hutson NP 7am-7pm  Monday - Thursday   7am-5pm  Fridays  [phone] (fax)    Luis Alberto KAUR  [phone]   Urgent Care - [place] and [place] Monday-Friday  [place] - 11am-8pm  Saturday-Sunday  Both sites - 9am-5pm  [phone] - [facility]  [phone] - [place]       What options do I have for visits at the clinic other than the traditional office visit?  To expand how we care for you, many of our providers are utilizing electronic visits (e-visits) and telephone visits, when medically appropriate, for interactions with their patients rather than a visit in the clinic.   We also offer nurse visits for many medical concerns. Just like any other service, we will bill your insurance company for this type of visit based on time spent on the phone with your provider. Not all insurance companies cover these visits. Please check with your medical insurance if this type of visit is covered. You will be responsible for any charges that are not paid by your insurance.      E-visits via [employer]:  generally incur a $35.00 fee.  Telephone visits:  Time spent on the phone: *charged based on time that is spent on the phone in increments of 10 minutes. Estimated cost:   5-10 mins $30.00   11-20 mins. $59.00   21-30 mins. $85.00     As always, Thank you for trusting us with your health care needs!            Discharge JANELL Todd CMA

## 2017-12-14 NOTE — NURSING NOTE
"Chief Complaint   Patient presents with     URI       Initial /72 (BP Location: Right arm, Patient Position: Chair, Cuff Size: Adult Large)  Pulse 78  Temp 98.7  F (37.1  C) (Oral)  Ht 5' 9.5\" (1.765 m)  Wt 250 lb (113.4 kg)  SpO2 95%  BMI 36.39 kg/m2 Estimated body mass index is 36.39 kg/(m^2) as calculated from the following:    Height as of this encounter: 5' 9.5\" (1.765 m).    Weight as of this encounter: 250 lb (113.4 kg).  Medication Reconciliation: complete    "

## 2017-12-14 NOTE — MR AVS SNAPSHOT
After Visit Summary   12/14/2017    Sandra Dos Santos    MRN: [id number]           Patient Information     Date Of Birth          1955        Visit Information        Provider Department      12/14/2017 2:30 PM Deborah Leslie MD; MULTILINGUAL WORD [facility]        Today's Diagnoses     Screening for diabetic peripheral neuropathy    -  1    Mild intermittent asthma with exacerbation          Care Instructions    [facility]    If you have any questions regarding to your visit please contact your care team:       Team Red:   Clinic Hours Telephone Number   Dr. Twyla Lucio  (pediatrics)  Paula Hutson NP 7am-7pm  Monday - Thursday   7am-5pm  Fridays  [phone] (fax)    Luis Alberto KAUR  [phone]   Urgent Care - [place] and [place] Monday-Friday  [place] - 11am-8pm  Saturday-Sunday  Both sites - 9am-5pm  [phone] - [facility]  [phone] - [place]       What options do I have for visits at the clinic other than the traditional office visit?  To expand how we care for you, many of our providers are utilizing electronic visits (e-visits) and telephone visits, when medically appropriate, for interactions with their patients rather than a visit in the clinic.   We also offer nurse visits for many medical concerns. Just like any other service, we will bill your insurance company for this type of visit based on time spent on the phone with your provider. Not all insurance companies cover these visits. Please check with your medical insurance if this type of visit is covered. You will be responsible for any charges that are not paid by your insurance.      E-visits via [employer]:  generally incur a $35.00 fee.  Telephone visits:  Time spent on the phone: *charged based on time that is spent on the phone in increments of 10 minutes. Estimated cost:   5-10 mins $30.00   11-20 mins. $59.00   21-30 mins. $85.00  "    As always, Thank you for trusting us with your health care needs!            Discharge JANELL Ludwigamparo Perez  [facility]            Follow-ups after your visit        Your next 10 appointments already scheduled     Dec 21, 2017 10:45 AM CST   SHORT with Viri Yi [facility] ([facility])    [address]  [place] [id number] [phone]              Who to contact     If you have questions or need follow up information about today's clinic visit or your schedule please contact [facility] directly at [phone].  Normal or non-critical lab and imaging results will be communicated to you by MyChart, letter or phone within 4 business days after the clinic has received the results. If you do not hear from us within 7 days, please contact the clinic through MyChart or phone. If you have a critical or abnormal lab result, we will notify you by phone as soon as possible.  Submit refill requests through [employer] or call your pharmacy and they will forward the refill request to us. Please allow 3 business days for your refill to be completed.          Additional Information About Your Visit        [employer] Information     [employer] lets you send messages to your doctor, view your test results, renew your prescriptions, schedule appointments and more. To sign up, go to [email or web address] . Click on \"Log in\" on the left side of the screen, which will take you to the Welcome page. Then click on \"Sign up Now\" on the right side of the page.     You will be asked to enter the access code listed below, as well as some personal information. Please follow the directions to create your username and password.     Your access code is: [email or web address]  Expires: 3/14/2018  3:08 PM     Your access code will  in 90 days. If you need help or a new code, please call your [facility] or [phone].        Care EveryWhere ID     This is your Care EveryWhere ID. This " "could be used by other organizations to access your [place] medical records  [id number]        Your Vitals Were     Pulse Temperature Height Pulse Oximetry BMI (Body Mass Index)       78 98.7  F (37.1  C) (Oral) 5' 9.5\" (1.765 m) 95% 36.39 kg/m2        Blood Pressure from Last 3 Encounters:   12/14/17 130/72   11/01/17 112/62   09/12/17 122/70    Weight from Last 3 Encounters:   12/14/17 250 lb (113.4 kg)   11/01/17 258 lb (117 kg)   09/12/17 255 lb (115.7 kg)              Today, you had the following     No orders found for display         Today's Medication Changes          These changes are accurate as of: 12/14/17  3:08 PM.  If you have any questions, ask your nurse or doctor.               Start taking these medicines.        Dose/Directions    benzonatate 200 MG capsule   Commonly known as:  TESSALON   Used for:  Mild intermittent asthma with exacerbation   Started by:  Deborah Leslie MD        Dose:  200 mg   Take 1 capsule (200 mg) by mouth 3 times daily as needed for cough   Quantity:  21 capsule   Refills:  0       predniSONE 10 MG tablet   Commonly known as:  DELTASONE   Used for:  Screening for diabetic peripheral neuropathy, Mild intermittent asthma with exacerbation   Started by:  Deborah Leslie MD        40 mg daily x 3 days, 30 mg daily x 3 days, 20 mg daily x 3 days ,10 mg daily x 3 days   Quantity:  30 tablet   Refills:  0            Where to get your medicines      These medications were sent to [facility] [place]  [place], [place] - [facility] [address]  [address], [place] [address]     Phone:  [phone]     benzonatate 200 MG capsule    predniSONE 10 MG tablet                Primary Care Provider Office Phone # Fax #    Deborah Leslie [phone]       [address] [place] [address]  [place] [address]        Equal Access to Services     ELISEO RO AH: Jose rao Solynette, waaxda luqadaha, qaybta kaalmada adeegyada, araseli fuchs. So [facility] " [phone].    ATENCIÓN: Si damon mendoza, tiene a fletcher disposición servicios gratuitos de asistencia lingüística. Radha montejo [phone].    We comply with applicable federal civil rights laws and [place] laws. We do not discriminate on the basis of race, color, national origin, age, disability, sex, sexual orientation, or gender identity.            Thank you!     Thank you for choosing [facility]  for your care. Our goal is always to provide you with excellent care. Hearing back from our patients is one way we can continue to improve our services. Please take a few minutes to complete the written survey that you may receive in the mail after your visit with us. Thank you!             Your Updated Medication List - Protect others around you: Learn how to safely use, store and throw away your medicines at www.disposemymeds.org.          This list is accurate as of: 12/14/17  3:08 PM.  Always use your most recent med list.                   Brand Name Dispense Instructions for use Diagnosis    acetaminophen 325 MG tablet    TYLENOL     Take 325-650 mg by mouth every 6 hours as needed for mild pain        * albuterol (2.5 MG/3ML) 0.083% neb solution     25 vial    Take 1 vial (2.5 mg) by nebulization every 4 hours as needed for shortness of breath / dyspnea or wheezing    Intermittent asthma, uncomplicated       * albuterol 108 (90 BASE) MCG/ACT Inhaler    PROAIR HFA/PROVENTIL HFA/VENTOLIN HFA    1 Inhaler    Inhale 2 puffs into the lungs every 4 hours as needed for shortness of breath / dyspnea or wheezing    Intermittent asthma, uncomplicated       atorvastatin 10 MG tablet    LIPITOR    90 tablet    Take 1 tablet (10 mg) by mouth daily    Hyperlipidemia LDL goal <100       B-12 1000 MCG Tbcr     100 tablet    Take 1,000 mcg by mouth daily    Pernicious anemia       benzonatate 200 MG capsule    TESSALON    21 capsule    Take 1 capsule (200 mg) by mouth 3 times daily as needed for cough    Mild  intermittent asthma with exacerbation       * cholecalciferol 5000 UNITS Tabs tablet    vitamin D3     Take by mouth daily        * cholecalciferol 1000 UNIT tablet    vitamin D3    100 tablet    Take 1 tablet (1,000 Units) by mouth daily    Preventative health care       glimepiride 4 MG tablet    AMARYL    180 tablet    Take 1 tablet (4 mg) by mouth 2 times daily    Uncontrolled type 2 diabetes mellitus with stage 3 chronic kidney disease, with long-term current use of insulin (H)       GLOBAL EASE INJECT PEN NEEDLES 31G X 5 MM   Generic drug:  insulin pen needle     100 each    Use daily    Type 2 diabetes mellitus with stage 3 chronic kidney disease, with long-term current use of insulin (H)       insulin glargine U-300 300 UNIT/ML injection    TOUJEO SOLOSTAR    15 mL    Inject 15 Units Subcutaneous At Bedtime    Uncontrolled type 2 diabetes mellitus with stage 3 chronic kidney disease, with long-term current use of insulin (H)       INVOKANA 100 MG tablet   Generic drug:  canagliflozin     90 tablet    Take 1 tablet (100 mg) by mouth every morning (before breakfast)        lidocaine-prilocaine cream    EMLA    5800 g    Apply topically as needed for moderate pain        losartan 25 MG tablet    COZAAR    90 tablet    Take 1 tablet (25 mg) by mouth daily    Hypertension goal BP (blood pressure) < 140/90       * order for DME     400 each    Test strips for pt's glucometer, brand as covered by insurance. Test four times daily and prn.    Type 2 diabetes mellitus with stage 3 chronic kidney disease, with long-term current use of insulin (H)       * order for DME     400 each    Lancets.  Four times daily and prn.    Type 2 diabetes mellitus with stage 3 chronic kidney disease, with long-term current use of insulin (H)       predniSONE 10 MG tablet    DELTASONE    30 tablet    40 mg daily x 3 days, 30 mg daily x 3 days, 20 mg daily x 3 days ,10 mg daily x 3 days    Screening for diabetic peripheral neuropathy,  Mild intermittent asthma with exacerbation       pregabalin 150 MG capsule    LYRICA    60 capsule    Take 1 capsule (150 mg) by mouth 2 times daily        ranitidine 150 MG tablet    ZANTAC    180 tablet    Take 1 tablet (150 mg) by mouth 2 times daily    Gastroesophageal reflux disease without esophagitis       sertraline 50 MG tablet    ZOLOFT    30 tablet    TAKE ONE TABLET BY MOUTH EVERY DAY    Anxiety       tamsulosin 0.4 MG capsule    FLOMAX    90 capsule    Take 1 capsule (0.4 mg) by mouth daily    Benign prostatic hyperplasia, presence of lower urinary tract symptoms unspecified, unspecified morphology       thiamine 100 MG tablet     100 tablet    Take 1 tablet (100 mg) by mouth daily    Type 2 diabetes mellitus with stage 3 chronic kidney disease, with long-term current use of insulin (H)       traMADol 50 MG tablet    ULTRAM    120 tablet    Take 1-2 tablets ( mg) by mouth every 6 hours as needed for pain maximum 4 tablet(s) per day    Chronic pain syndrome       traZODone 50 MG tablet    DESYREL    25 tablet    TAKE ONE-HALF TO ONE TABLET BY MOUTH EACH NIGHT AT BEDTIME    Insomnia, unspecified type       * Notice:  This list has 6 medication(s) that are the same as other medications prescribed for you. Read the directions carefully, and ask your doctor or other care provider to review them with you.

## 2017-12-21 ENCOUNTER — OFFICE VISIT (OUTPATIENT)
Dept: PHARMACY | Facility: CLINIC | Age: 62
End: 2017-12-21
Payer: COMMERCIAL

## 2017-12-21 DIAGNOSIS — J45.20 INTERMITTENT ASTHMA, UNCOMPLICATED: ICD-10-CM

## 2017-12-21 DIAGNOSIS — G47.00 INSOMNIA, UNSPECIFIED TYPE: ICD-10-CM

## 2017-12-21 DIAGNOSIS — G89.4 CHRONIC PAIN SYNDROME: ICD-10-CM

## 2017-12-21 DIAGNOSIS — R39.11 URINARY HESITANCY: ICD-10-CM

## 2017-12-21 DIAGNOSIS — I10 HYPERTENSION GOAL BP (BLOOD PRESSURE) < 140/90: ICD-10-CM

## 2017-12-21 DIAGNOSIS — J06.9 UPPER RESPIRATORY TRACT INFECTION, UNSPECIFIED TYPE: ICD-10-CM

## 2017-12-21 DIAGNOSIS — J06.9 URI (UPPER RESPIRATORY INFECTION): Primary | ICD-10-CM

## 2017-12-21 DIAGNOSIS — F41.9 ANXIETY: ICD-10-CM

## 2017-12-21 DIAGNOSIS — E78.5 HYPERLIPIDEMIA LDL GOAL <100: ICD-10-CM

## 2017-12-21 DIAGNOSIS — E63.9 NUTRITIONAL DISORDER: ICD-10-CM

## 2017-12-21 PROCEDURE — 99607 MTMS BY PHARM ADDL 15 MIN: CPT | Performed by: PHARMACIST

## 2017-12-21 PROCEDURE — 99606 MTMS BY PHARM EST 15 MIN: CPT | Performed by: PHARMACIST

## 2017-12-21 RX ORDER — ACETAMINOPHEN 325 MG/1
325-650 TABLET ORAL EVERY 6 HOURS PRN
Qty: 100 TABLET | Refills: 1 | Status: SHIPPED | OUTPATIENT
Start: 2017-12-21

## 2017-12-21 RX ORDER — GUAIFENESIN AND DEXTROMETHORPHAN HYDROBROMIDE 100; 10 MG/5ML; MG/5ML
5 SOLUTION ORAL EVERY 4 HOURS PRN
COMMUNITY
End: 2018-02-21

## 2017-12-21 RX ORDER — PEN NEEDLE, DIABETIC 31 GX3/16"
NEEDLE, DISPOSABLE MISCELLANEOUS
Qty: 100 EACH | Refills: 3 | Status: SHIPPED | OUTPATIENT
Start: 2017-12-21

## 2017-12-21 NOTE — MR AVS SNAPSHOT
"              After Visit Summary   12/21/2017    Sandra Dos Santos    MRN: [id number]           Patient Information     Date Of Birth          1955        Visit Information        Provider Department      12/21/2017 10:45 AM Open, Assignments; Viri Yi, [facility]        Today's Diagnoses     URI (upper respiratory infection)    -  1    Uncontrolled type 2 diabetes mellitus with stage 3 chronic kidney disease, with long-term current use of insulin (H)        Hypertension goal BP (blood pressure) < 140/90        Hyperlipidemia LDL goal <100        Anxiety        Insomnia, unspecified type        Chronic pain syndrome        Intermittent asthma, uncomplicated        Urinary hesitancy        Nutritional disorder        Upper respiratory tract infection, unspecified type           Follow-ups after your visit        Who to contact     If you have questions or need follow up information about today's clinic visit or your schedule please contact [facility] directly at [phone].  Normal or non-critical lab and imaging results will be communicated to you by MyChart, letter or phone within 4 business days after the clinic has received the results. If you do not hear from us within 7 days, please contact the clinic through [employer] or phone. If you have a critical or abnormal lab result, we will notify you by phone as soon as possible.  Submit refill requests through [employer] or call your pharmacy and they will forward the refill request to us. Please allow 3 business days for your refill to be completed.          Additional Information About Your Visit        MyChart Information     [employer] lets you send messages to your doctor, view your test results, renew your prescriptions, schedule appointments and more. To sign up, go to [email or web address] . Click on \"Log in\" on the left side of the screen, which will take you to the Welcome page. Then click on \"Sign up Now\" on the " right side of the page.     You will be asked to enter the access code listed below, as well as some personal information. Please follow the directions to create your username and password.     Your access code is: [email or web address]  Expires: 3/14/2018  3:08 PM     Your access code will  in 90 days. If you need help or a new code, please call your [place] clinic or [phone].        Care EveryWhere ID     This is your Care EveryWhere ID. This could be used by other organizations to access your [place] medical records  [id number]         Blood Pressure from Last 3 Encounters:   17 110/60   17 130/72   17 112/62    Weight from Last 3 Encounters:   17 246 lb (111.6 kg)   17 250 lb (113.4 kg)   17 258 lb (117 kg)              Today, you had the following     No orders found for display         Today's Medication Changes          These changes are accurate as of: 17 11:59 PM.  If you have any questions, ask your nurse or doctor.               These medicines have changed or have updated prescriptions.        Dose/Directions    insulin glargine U-300 300 UNIT/ML injection   Commonly known as:  TOUJEO SOLOSTAR   This may have changed:  how much to take   Used for:  Uncontrolled type 2 diabetes mellitus with stage 3 chronic kidney disease, with long-term current use of insulin (H)        Dose:  18 Units   Inject 18 Units Subcutaneous At Bedtime   Quantity:  15 mL   Refills:  0       ranitidine 150 MG tablet   Commonly known as:  ZANTAC   This may have changed:  when to take this   Used for:  Gastroesophageal reflux disease without esophagitis        Dose:  150 mg   Take 1 tablet (150 mg) by mouth 2 times daily   Quantity:  180 tablet   Refills:  3            Where to get your medicines      These medications were sent to [facility] [place] [facility] [place] [address]  [address] [place] [address] [address]     Phone:  [phone]     acetaminophen 325 MG tablet     GLOBAL EASE INJECT PEN NEEDLES 31G X 5 MM         Some of these will need a paper prescription and others can be bought over the counter.  Ask your nurse if you have questions.     You don't need a prescription for these medications     insulin glargine U-300 300 UNIT/ML injection                Primary Care Provider Office Phone # Fax #    Deborah Leslie [phone]       [address] [address]        Equal Access to Services     PRITESH RO : Hadii aad ku hadasho Soomaali, waaxda luqadaha, qaybta kaalmada adeegyada, waxay idiin hayaan adeeg khpetermariza lacody . So [facility] [place] [phone].    ATENCIÓN: Si habla español, tiene a fletcher disposición servicios gratuitos de asistencia lingüística. [facility] [phone].    We comply with applicable federal civil rights laws and [place] laws. We do not discriminate on the basis of race, color, national origin, age, disability, sex, sexual orientation, or gender identity.            Thank you!     Thank you for choosing [facility]  for your care. Our goal is always to provide you with excellent care. Hearing back from our patients is one way we can continue to improve our services. Please take a few minutes to complete the written survey that you may receive in the mail after your visit with us. Thank you!             Your Updated Medication List - Protect others around you: Learn how to safely use, store and throw away your medicines at www.disposemymeds.org.          This list is accurate as of: 12/21/17 11:59 PM.  Always use your most recent med list.                   Brand Name Dispense Instructions for use Diagnosis    acetaminophen 325 MG tablet    TYLENOL    100 tablet    Take 1-2 tablets (325-650 mg) by mouth every 6 hours as needed for mild pain    Chronic pain syndrome       * albuterol (2.5 MG/3ML) 0.083% neb solution     25 vial    Take 1 vial (2.5 mg) by nebulization every 4 hours as needed for shortness of breath /  dyspnea or wheezing    Intermittent asthma, uncomplicated       * albuterol 108 (90 BASE) MCG/ACT Inhaler    PROAIR HFA/PROVENTIL HFA/VENTOLIN HFA    1 Inhaler    Inhale 2 puffs into the lungs every 4 hours as needed for shortness of breath / dyspnea or wheezing    Intermittent asthma, uncomplicated       ASPIRIN NOT PRESCRIBED    INTENTIONAL     Antiplatelet medication not prescribed intentionally due to possible aneurysm seen on CT        atorvastatin 10 MG tablet    LIPITOR    90 tablet    Take 1 tablet (10 mg) by mouth daily    Hyperlipidemia LDL goal <100       B-12 1000 MCG Tbcr     100 tablet    Take 1,000 mcg by mouth daily    Pernicious anemia       benzonatate 200 MG capsule    TESSALON    21 capsule    Take 1 capsule (200 mg) by mouth 3 times daily as needed for cough    Mild intermittent asthma with exacerbation       cholecalciferol 1000 UNIT tablet    vitamin D3    100 tablet    Take 1 tablet (1,000 Units) by mouth daily    Preventative health care       Dextromethorphan-guaiFENesin  MG/5ML syrup      Take 5 mLs by mouth every 4 hours as needed for cough        glimepiride 4 MG tablet    AMARYL    180 tablet    Take 1 tablet (4 mg) by mouth 2 times daily    Uncontrolled type 2 diabetes mellitus with stage 3 chronic kidney disease, with long-term current use of insulin (H)       GLOBAL EASE INJECT PEN NEEDLES 31G X 5 MM   Generic drug:  insulin pen needle     100 each    Use daily        insulin glargine U-300 300 UNIT/ML injection    TOUJEO SOLOSTAR    15 mL    Inject 18 Units Subcutaneous At Bedtime    Uncontrolled type 2 diabetes mellitus with stage 3 chronic kidney disease, with long-term current use of insulin (H)       INVOKANA 100 MG tablet   Generic drug:  canagliflozin     90 tablet    Take 1 tablet (100 mg) by mouth every morning (before breakfast)        lidocaine-prilocaine cream    EMLA    5800 g    Apply topically as needed for moderate pain        losartan 25 MG tablet    COZAAR     90 tablet    Take 1 tablet (25 mg) by mouth daily    Hypertension goal BP (blood pressure) < 140/90       * order for DME     400 each    Test strips for pt's glucometer, brand as covered by insurance. Test four times daily and prn.    Type 2 diabetes mellitus with stage 3 chronic kidney disease, with long-term current use of insulin (H)       * order for DME     400 each    Lancets.  Four times daily and prn.    Type 2 diabetes mellitus with stage 3 chronic kidney disease, with long-term current use of insulin (H)       predniSONE 10 MG tablet    DELTASONE    30 tablet    40 mg daily x 3 days, 30 mg daily x 3 days, 20 mg daily x 3 days ,10 mg daily x 3 days    Mild intermittent asthma with exacerbation       ranitidine 150 MG tablet    ZANTAC    180 tablet    Take 1 tablet (150 mg) by mouth 2 times daily    Gastroesophageal reflux disease without esophagitis       sertraline 50 MG tablet    ZOLOFT    30 tablet    TAKE ONE TABLET BY MOUTH EVERY DAY    Anxiety       tamsulosin 0.4 MG capsule    FLOMAX    90 capsule    Take 1 capsule (0.4 mg) by mouth daily    Benign prostatic hyperplasia, presence of lower urinary tract symptoms unspecified, unspecified morphology       thiamine 100 MG tablet     100 tablet    Take 1 tablet (100 mg) by mouth daily    Type 2 diabetes mellitus with stage 3 chronic kidney disease, with long-term current use of insulin (H)       traZODone 50 MG tablet    DESYREL    25 tablet    TAKE ONE-HALF TO ONE TABLET BY MOUTH EACH NIGHT AT BEDTIME    Insomnia, unspecified type       * Notice:  This list has 4 medication(s) that are the same as other medications prescribed for you. Read the directions carefully, and ask your doctor or other care provider to review them with you.

## 2017-12-21 NOTE — PROGRESS NOTES
SUBJECTIVE/OBJECTIVE:                Sandra Dos Santos is a 62 year old male coming in for a follow-up visit for Medication Therapy Management.  He was referred to me from Dr. Leslie.  Unfortunately there is a note from  services that no  was available for our visit today, Sandra declines contacting a  via telephone for our visit.     Chief Complaint: Follow up from our visit on 10/10/17.  Here to f/up on diabetes management.  Also has a URI and doesn't feel medications are helping.    Tobacco: No tobacco use  Alcohol: none    Medication Adherence: no issues reported    URI:  He saw Dr. Leslie 17, was diagnosed with URI and asthma exacerbation.  He was prescribed prednisone taper and benzonatate.  He's also been using sugar free Robitussin.  He doesn't feel any of these are helping.  He requests that I prescribe him an antibiotic today.    Diabetes:  Pt currently taking Toujeo 20 units daily, Invokana 100mg daily and glimepiride 8mg daily. Pt is not experiencing side effects.  SMB-2 times daily.   Ranges (based on glucometer readings): 7 day avg 132mg/dL (n11), 14 day avg 125mg/dL (n15), 30 day avg 117mg/dL (n31).  Has several AM readings in the 60s  Patient is experiencing hypoglycemia. Frequency of hypoglycemia? 1-2 times per week. Symptoms of low blood sugar? Shaky.  He's having readings in the 60s a few times per week   Recent symptoms of high blood sugar? none  Eye exam: up to date  Foot exam: due  Microalbumin is < 30 mg/g. Pt is taking an ACEi/ARB.  Aspirin: Not taking due to potential aneurysm seen on CT    Hypertension: Current medications include losartan 25mg daily.  Patient does not self-monitor BP.  Patient reports no current medication side effects.     Hyperlipidemia: Current therapy includes atorvastatin 10mg once daily.  Pt reports no significant myalgias or other side effects.   The 10-year ASCVD risk score ([place] ANGUS Jr, et al., 2013) is: 18.7%    Values used to  "calculate the score:      Age: 62 years      Sex: Male      Is Non- : No      Diabetic: Yes      Tobacco smoker: No      Systolic Blood Pressure: 130 mmHg      Is BP treated: Yes      HDL Cholesterol: 50 mg/dL      Total Cholesterol: 162 mg/dL     Anxiety/Insomnia:  He continues taking sertraline 50mg daily and trazodone 50mg QHS.  He feels this has been \"super\" helpful and he denies side effects.    Pain:  Current regimen includes APAP 650mg QID PRN, Lyrica 150mg BID, and tramadol 50-100mg QID PRN. He requests refills of Lyrica and tramadol.  He also uses EMLA cream PRN, which he finds effective.  He feels this regimen is working well and he denies side effects.    Asthma:  Current asthma medications: Albuterol MDI and Nebs (use is rare, only when sick).    Asthma triggers include: upper respiratory infections.  Pt reports the following symptoms: none.  AAP on file: YESACT Total Scores 12/2/2016 5/16/2017   ACT TOTAL SCORE (Goal Greater than or Equal to 20) 23 20   In the past 12 months, how many times did you visit the emergency room for your asthma without being admitted to the hospital? 0 0   In the past 12 months, how many times were you hospitalized overnight because of your asthma? 0 0     BPH:  He's currently taking tamsulosin 0.4mg daily and feels this works well.  He denies side effects.    Supplements:  Current supplements include Vitamin D 1000 IU daily, Vitamin B-12 1000mcg daily, and thiamine 100mg daily.  He denies side effects of therapy.  No Vitamin B levels on file in [facility] or care everywhere.  Last Vitamin D level (1/4/17) = 54 ug/L    Current labs include:BP Readings from Last 3 Encounters:   12/14/17 130/72   11/01/17 112/62   09/12/17 122/70     Lab Results   Component Value Date    A1C 7.9 11/01/2017   .  Lab Results   Component Value Date    CHOL 162 01/04/2017     Lab Results   Component Value Date    TRIG 197 01/04/2017     Lab Results   Component Value Date    HDL 50 " 01/04/2017     Lab Results   Component Value Date    LDL 73 01/04/2017       Liver Function Studies -   Recent Labs   Lab Test  11/29/16   1101   PROTTOTAL  7.0   ALBUMIN  3.6   BILITOTAL  0.4   ALKPHOS  94   AST  10   ALT  26       Lab Results   Component Value Date    UCRR 61 11/01/2017    MICROL <5 11/01/2017    UMALCR Unable to calculate due to low value 11/01/2017       Last Basic Metabolic Panel:  Lab Results   Component Value Date     11/29/2016      Lab Results   Component Value Date    POTASSIUM 4.6 11/29/2016     Lab Results   Component Value Date    CHLORIDE 101 11/29/2016     Lab Results   Component Value Date    BUN 21 11/29/2016     Lab Results   Component Value Date    CR 1.21 11/01/2017     GFR Estimate   Date Value Ref Range Status   11/01/2017 61 >60 mL/min/1.7m2 Final     Comment:     Non  GFR Calc   11/29/2016 60 (L) >60 mL/min/1.7m2 Final     Comment:     Non  GFR Calc   10/15/2012 >90 >60 mL/min/1.7m2 Final       TSH   Date Value Ref Range Status   11/29/2016 2.58 0.40 - 4.00 mU/L Final   ]    Most Recent Immunizations   Administered Date(s) Administered     HepB 03/08/2017     Influenza (IIV3) PF 08/01/2016     Influenza Vaccine IM 3yrs+ 4 Valent IIV4 09/12/2017     Pneumococcal 23 valent 12/02/2016     TDAP Vaccine (Adacel) 12/02/2016     Zoster vaccine, live 03/01/2015       ASSESSMENT:              Current medications were reviewed today as discussed above.      Medication Adherence: no issues identified    URI: Needs further evaluation, I'm unable to diagnose or prescribe antibiotics.     Diabetes: Needs Improvement. Patient is not meeting A1c goal of < 7%, but medications have been adjusted since that time.  He's having more hypoglycemia than I'd like, would benefit from reducing Toujeo dose slightly as this is primarily occurring in the AM.    Hypertension: Stable. Patient is meeting BP goal of < 140/90mmHg.      Hyperlipidemia: Needs Improvement. Pt  is not on high intensity statin which is indicated based on 2013 ACC/AHA guidelines for lipid management (this was not discussed today).       Anxiety/Insomnia: Stable.    Pain:  Stable, can discuss refills with PCP tomorrow.    Asthma: Stable. Up to date and at goal of ACT > or equal to 20.     BPH:  Stable.    Supplements:  Unclear indication for B vitamins, may benefit from checking levels (not discussed with pt today).     PLAN:                1.  Appt scheduled for him to see Dr. Leslie tomorrow.  2.  Reduced Toujeo to 18 units daily.  3.  Future considerations - change atorvastatin to 40mg daily, get B vitamin labs    I spent 30 minutes with this patient today. All changes were made via collaborative practice agreement with Deborah Leslie. A copy of the visit note was provided to the patient's primary care provider.     Will follow up in 1 month, sooner if needed.    The patient was given a summary of these recommendations as an after visit summary.    Viri Yi, PharmD, BCACP  Medication Therapy Management Provider  Pager: [phone]

## 2017-12-22 ENCOUNTER — RADIANT APPOINTMENT (OUTPATIENT)
Dept: GENERAL RADIOLOGY | Facility: CLINIC | Age: 62
End: 2017-12-22
Attending: FAMILY MEDICINE
Payer: MEDICARE

## 2017-12-22 ENCOUNTER — OFFICE VISIT (OUTPATIENT)
Dept: FAMILY MEDICINE | Facility: CLINIC | Age: 62
End: 2017-12-22
Payer: MEDICARE

## 2017-12-22 VITALS
BODY MASS INDEX: 35.22 KG/M2 | WEIGHT: 246 LBS | OXYGEN SATURATION: 94 % | RESPIRATION RATE: 16 BRPM | DIASTOLIC BLOOD PRESSURE: 60 MMHG | HEART RATE: 83 BPM | SYSTOLIC BLOOD PRESSURE: 110 MMHG | TEMPERATURE: 96.8 F | HEIGHT: 70 IN

## 2017-12-22 DIAGNOSIS — Z79.4 TYPE 2 DIABETES MELLITUS WITH STAGE 3 CHRONIC KIDNEY DISEASE, WITH LONG-TERM CURRENT USE OF INSULIN (H): ICD-10-CM

## 2017-12-22 DIAGNOSIS — E11.22 TYPE 2 DIABETES MELLITUS WITH STAGE 3 CHRONIC KIDNEY DISEASE, WITH LONG-TERM CURRENT USE OF INSULIN (H): ICD-10-CM

## 2017-12-22 DIAGNOSIS — J20.9 ACUTE BRONCHITIS, UNSPECIFIED ORGANISM: Primary | ICD-10-CM

## 2017-12-22 DIAGNOSIS — J45.901 MILD ASTHMA EXACERBATION: ICD-10-CM

## 2017-12-22 DIAGNOSIS — N18.30 TYPE 2 DIABETES MELLITUS WITH STAGE 3 CHRONIC KIDNEY DISEASE, WITH LONG-TERM CURRENT USE OF INSULIN (H): ICD-10-CM

## 2017-12-22 PROCEDURE — 71020 XR CHEST 2 VW: CPT

## 2017-12-22 PROCEDURE — 99213 OFFICE O/P EST LOW 20 MIN: CPT | Performed by: FAMILY MEDICINE

## 2017-12-22 RX ORDER — BENZONATATE 200 MG/1
200 CAPSULE ORAL 3 TIMES DAILY PRN
Qty: 21 CAPSULE | Refills: 0 | Status: SHIPPED | OUTPATIENT
Start: 2017-12-22 | End: 2018-02-21

## 2017-12-22 RX ORDER — AZITHROMYCIN 250 MG/1
TABLET, FILM COATED ORAL
Qty: 6 TABLET | Refills: 0 | Status: SHIPPED | OUTPATIENT
Start: 2017-12-22 | End: 2018-02-21

## 2017-12-22 ASSESSMENT — PAIN SCALES - GENERAL: PAINLEVEL: NO PAIN (0)

## 2017-12-22 NOTE — NURSING NOTE
"Chief Complaint   Patient presents with     RECHECK     URI and refills of traMADol (ULTRAM) 50 MG tablet and Lyrica       Initial /60  Pulse 83  Temp 96.8  F (36  C) (Oral)  Resp 16  Ht 5' 9.5\" (1.765 m)  Wt 246 lb (111.6 kg)  SpO2 94%  BMI 35.81 kg/m2 Estimated body mass index is 35.81 kg/(m^2) as calculated from the following:    Height as of this encounter: 5' 9.5\" (1.765 m).    Weight as of this encounter: 246 lb (111.6 kg).  Medication Reconciliation: complete   Janae Jain CMA (AAMA)      "

## 2017-12-22 NOTE — PROGRESS NOTES
SUBJECTIVE:   Sandra Dos Santos is a 62 year old male who presents to clinic today for the following health issues:      Pt still has cough and Mild wheezing  No fever or chills  Pt Took Prednisone which did not help much  Pt says every winter he gets These symptoms  Pt is a Known Diabetic and has asthma  He has been Using his nebs TID        Problem list and histories reviewed & adjusted, as indicated.  Additional history: as documented    Patient Active Problem List   Diagnosis     HYPERLIPIDEMIA LDL GOAL <100     Chronic kidney disease, stage III (moderate)     Gastroesophageal reflux disease     Hypothyroidism     Urinary hesitancy     Type 2 diabetes mellitus with stage 3 chronic kidney disease, with long-term current use of insulin (H)     Chronic pain syndrome     Morbid obesity due to excess calories (H)     Intermittent asthma, uncomplicated     Benign prostatic hyperplasia, presence of lower urinary tract symptoms unspecified, unspecified morphology     Gastroesophageal reflux disease without esophagitis     Hypertension goal BP (blood pressure) < 140/90     Lumbar stenosis     DDD (degenerative disc disease), lumbar     Osteoarthritis of lumbar spine, unspecified spinal osteoarthritis complication status     Facet arthropathy     Osteoarthritis of spine with radiculopathy, lumbosacral region     Other spondylosis, lumbosacral region     Aneurysm of internal carotid artery     Uncontrolled type 2 diabetes mellitus with stage 3 chronic kidney disease, with long-term current use of insulin (H)     Anxiety     Past Surgical History:   Procedure Laterality Date     ARTHROSCOPY SHOULDER RT/LT Right      JOINT REPLACEMTN, KNEE RT/LT Bilateral 2013  ,2012    Joint Replacement knee RT/LT       Social History   Substance Use Topics     Smoking status: Never Smoker     Smokeless tobacco: Never Used     Alcohol use No     Family History   Problem Relation Age of Onset     DIABETES Father      Prostate Cancer No family  hx of      Colon Cancer No family hx of      Breast Cancer No family hx of          Current Outpatient Prescriptions   Medication Sig Dispense Refill     azithromycin (ZITHROMAX) 250 MG tablet Two tablets first day, then one tablet daily for four days. 6 tablet 0     benzonatate (TESSALON) 200 MG capsule Take 1 capsule (200 mg) by mouth 3 times daily as needed for cough 21 capsule 0     Dextromethorphan-guaiFENesin  MG/5ML syrup Take 5 mLs by mouth every 4 hours as needed for cough       insulin glargine U-300 (TOUJEO SOLOSTAR) 300 UNIT/ML injection Inject 18 Units Subcutaneous At Bedtime 15 mL 0     GLOBAL EASE INJECT PEN NEEDLES 31G X 5 MM Use daily 100 each 3     acetaminophen (TYLENOL) 325 MG tablet Take 1-2 tablets (325-650 mg) by mouth every 6 hours as needed for mild pain 100 tablet 1     predniSONE (DELTASONE) 10 MG tablet 40 mg daily x 3 days, 30 mg daily x 3 days, 20 mg daily x 3 days ,10 mg daily x 3 days 30 tablet 0     benzonatate (TESSALON) 200 MG capsule Take 1 capsule (200 mg) by mouth 3 times daily as needed for cough 21 capsule 0     sertraline (ZOLOFT) 50 MG tablet TAKE ONE TABLET BY MOUTH EVERY DAY 30 tablet 11     glimepiride (AMARYL) 4 MG tablet Take 1 tablet (4 mg) by mouth 2 times daily 180 tablet 0     INVOKANA 100 MG tablet Take 1 tablet (100 mg) by mouth every morning (before breakfast) 90 tablet 0     lidocaine-prilocaine (EMLA) cream Apply topically as needed for moderate pain 5800 g 1     atorvastatin (LIPITOR) 10 MG tablet Take 1 tablet (10 mg) by mouth daily 90 tablet 2     ranitidine (ZANTAC) 150 MG tablet Take 1 tablet (150 mg) by mouth 2 times daily (Patient taking differently: Take 150 mg by mouth daily ) 180 tablet 3     albuterol (PROAIR HFA/PROVENTIL HFA/VENTOLIN HFA) 108 (90 BASE) MCG/ACT Inhaler Inhale 2 puffs into the lungs every 4 hours as needed for shortness of breath / dyspnea or wheezing 1 Inhaler 11     traZODone (DESYREL) 50 MG tablet TAKE ONE-HALF TO ONE TABLET  BY MOUTH EACH NIGHT AT BEDTIME 25 tablet 2     cholecalciferol (VITAMIN D) 1000 UNIT tablet Take 1 tablet (1,000 Units) by mouth daily 100 tablet 3     albuterol (2.5 MG/3ML) 0.083% neb solution Take 1 vial (2.5 mg) by nebulization every 4 hours as needed for shortness of breath / dyspnea or wheezing 25 vial 1     Cyanocobalamin (B-12) 1000 MCG TBCR Take 1,000 mcg by mouth daily 100 tablet 1     thiamine 100 MG tablet Take 1 tablet (100 mg) by mouth daily 100 tablet 3     tamsulosin (FLOMAX) 0.4 MG capsule Take 1 capsule (0.4 mg) by mouth daily 90 capsule 3     order for DME Test strips for pt's glucometer, brand as covered by insurance. Test four times daily and prn. 400 each 4     order for DME Lancets.  Four times daily and prn. 400 each 4     losartan (COZAAR) 25 MG tablet Take 1 tablet (25 mg) by mouth daily 90 tablet 3     Allergies   Allergen Reactions     Asa [Aspirin] Other (See Comments)     dizzy     Metformin GI Disturbance     Morphine Itching     Itching     Recent Labs   Lab Test  11/01/17   1455  09/12/17   1423  08/01/17   1341   01/04/17   0757  11/29/16   1101  10/15/12   1108   09/15/10   1232   A1C  7.9*  8.6*  8.4*   < >  7.8*  8.1*   --    --   7.3*   LDL   --    --    --    --   73  122*   --    --   77   HDL   --    --    --    --   50  45   --    --   51   TRIG   --    --    --    --   197*  226*   --    --   167*   ALT   --    --    --    --    --   26  18   --    --    CR  1.21   --    --    --    --   1.22  0.80   < >   --    GFRESTIMATED  61   --    --    --    --   60*  >90   < >   --    GFRESTBLACK  73   --    --    --    --   73  >90   < >   --    POTASSIUM   --    --    --    --    --   4.6  4.2   --    --    TSH   --    --    --    --    --   2.58  1.16   --    --     < > = values in this interval not displayed.      BP Readings from Last 3 Encounters:   12/22/17 110/60   12/14/17 130/72   11/01/17 112/62    Wt Readings from Last 3 Encounters:   12/22/17 246 lb (111.6 kg)  "  12/14/17 250 lb (113.4 kg)   11/01/17 258 lb (117 kg)                  Labs reviewed in EPIC          Reviewed and updated as needed this visit by clinical staff       Reviewed and updated as needed this visit by Provider         ROS:  C: NEGATIVE for fever, chills, change in weight  INTEGUMENTARY/SKIN: NEGATIVE for worrisome rashes, moles or lesions  ENT/MOUTH: nasal congestion  RESP:cough nonprodutive   CV: NEGATIVE for chest pain, palpitations or peripheral edema  GI: NEGATIVE for nausea, abdominal pain, heartburn, or change in bowel habits  MUSCULOSKELETAL: NEGATIVE for significant arthralgias or myalgia    OBJECTIVE:     /60  Pulse 83  Temp 96.8  F (36  C) (Oral)  Resp 16  Ht 5' 9.5\" (1.765 m)  Wt 246 lb (111.6 kg)  SpO2 94%  BMI 35.81 kg/m2  Body mass index is 35.81 kg/(m^2).  GENERAL: healthy, alert and no distress  GENERAL: obese  NECK: no adenopathy, no asymmetry, masses, or scars and thyroid normal to palpation  RESP: expiratory wheezes few,no rales  CV: regular rate and rhythm, normal S1 S2, no S3 or S4, no murmur, click or rub, no peripheral edema and peripheral pulses strong  ABDOMEN: soft, nontender, no hepatosplenomegaly, no masses and bowel sounds normal  MS: no gross musculoskeletal defects noted, no edema    Diagnostic Test Results:  Results for orders placed or performed in visit on 12/22/17 (from the past 24 hour(s))   XR Chest 2 Views    Narrative    XR CHEST 2 VW 12/22/2017 12:59 PM    HISTORY: ; Acute bronchitis, unspecified organism      Impression    IMPRESSION: Calcified granulomas in the right lung apex. Chest  otherwise negative. No change compared to 10/15/2012.    GLADYS SHEIKH MD       ASSESSMENT/PLAN:             ICD-10-CM    1. Acute bronchitis, unspecified organism J20.9 XR Chest 2 Views     azithromycin (ZITHROMAX) 250 MG tablet     benzonatate (TESSALON) 200 MG capsule   2. Mild asthma exacerbation J45.901    3. Type 2 diabetes mellitus with stage 3 chronic kidney " disease, with long-term current use of insulin (H) E11.22     N18.3     Z79.4      SEE EPIC care orders  The potential side effects of this medication have been discussed with the patient.  Call if any significant problems with these are experienced.  Follow up 1 week if not better/sooner if worse  Pt will benefit from a advair when the acute symptoms are better  Deborah Leslie MD  [facility]

## 2017-12-22 NOTE — PATIENT INSTRUCTIONS
[facility]    If you have any questions regarding to your visit please contact your care team:       Team Red:   Clinic Hours Telephone Number   Dr. Twyla Lucio  (pediatrics)  Paula Hutson NP 7am-7pm  Monday - Thursday   7am-5pm  Fridays  [phone] (fax)    Luis Alberto KAUR  [phone]   Urgent Care - [place] and [place] Monday-Friday  [place] - 11am-8pm  Saturday-Sunday  Both sites - 9am-5pm  [phone] - [facility]  [phone] - [place]       What options do I have for visits at the clinic other than the traditional office visit?  To expand how we care for you, many of our providers are utilizing electronic visits (e-visits) and telephone visits, when medically appropriate, for interactions with their patients rather than a visit in the clinic.   We also offer nurse visits for many medical concerns. Just like any other service, we will bill your insurance company for this type of visit based on time spent on the phone with your provider. Not all insurance companies cover these visits. Please check with your medical insurance if this type of visit is covered. You will be responsible for any charges that are not paid by your insurance.      E-visits via [employer]:  generally incur a $35.00 fee.  Telephone visits:  Time spent on the phone: *charged based on time that is spent on the phone in increments of 10 minutes. Estimated cost:   5-10 mins $30.00   11-20 mins. $59.00   21-30 mins. $85.00     As always, Thank you for trusting us with your health care needs!            Discharge JANELL Todd CMA

## 2017-12-22 NOTE — MR AVS SNAPSHOT
After Visit Summary   12/22/2017    Sandra Dos Santos    MRN: [id number]           Patient Information     Date Of Birth          1955        Visit Information        Provider Department      12/22/2017 11:30 AM Deborah Leslie MD; MULTILINGUAL WORD [facility]        Today's Diagnoses     Acute bronchitis, unspecified organism    -  1    Intermittent asthma, uncomplicated          Care Instructions    [facility]    If you have any questions regarding to your visit please contact your care team:       Team Red:   Clinic Hours Telephone Number   Dr. Twyla Lucio  (pediatrics)  Paula Hutson NP 7am-7pm  Monday - Thursday   7am-5pm  Fridays  [phone] (fax)    Luis Alberto KAUR  [phone]   Urgent Care - [place] and [place] Monday-Friday  [place] - 11am-8pm  Saturday-Sunday  Both sites - 9am-5pm  [phone] - [facility]  [phone] - [place]       What options do I have for visits at the clinic other than the traditional office visit?  To expand how we care for you, many of our providers are utilizing electronic visits (e-visits) and telephone visits, when medically appropriate, for interactions with their patients rather than a visit in the clinic.   We also offer nurse visits for many medical concerns. Just like any other service, we will bill your insurance company for this type of visit based on time spent on the phone with your provider. Not all insurance companies cover these visits. Please check with your medical insurance if this type of visit is covered. You will be responsible for any charges that are not paid by your insurance.      E-visits via [employer]:  generally incur a $35.00 fee.  Telephone visits:  Time spent on the phone: *charged based on time that is spent on the phone in increments of 10 minutes. Estimated cost:   5-10 mins $30.00   11-20 mins. $59.00   21-30 mins. $85.00     As always,  "Thank you for trusting us with your health care needs!            Discharge JANELL Todd  [facility]            Follow-ups after your visit        Additional Services     ALLERGY/ASTHMA ADULT REFERRAL       Your provider has referred you to: [facility]: [facility] [phone]  [email or web address]    Please be aware that coverage of these services is subject to the terms and limitations of your health insurance plan.  Call member services at your health plan with any benefit or coverage questions.      Please bring the following with you to your appointment:    (1) Any X-Rays, CTs or MRIs which have been performed.  Contact the facility where they were done to arrange for  prior to your scheduled appointment.    (2) List of current medications  (3) This referral request   (4) Any documents/labs given to you for this referral                  Who to contact     If you have questions or need follow up information about today's clinic visit or your schedule please contact [facility] directly at [phone].  Normal or non-critical lab and imaging results will be communicated to you by MyChart, letter or phone within 4 business days after the clinic has received the results. If you do not hear from us within 7 days, please contact the clinic through MyChart or phone. If you have a critical or abnormal lab result, we will notify you by phone as soon as possible.  Submit refill requests through [employer] or call your pharmacy and they will forward the refill request to us. Please allow 3 business days for your refill to be completed.          Additional Information About Your Visit        [employer] Information     [employer] lets you send messages to your doctor, view your test results, renew your prescriptions, schedule appointments and more. To sign up, go to [email or web address] . Click on \"Log in\" on the left side of the screen, which will take you to the " "Welcome page. Then click on \"Sign up Now\" on the right side of the page.     You will be asked to enter the access code listed below, as well as some personal information. Please follow the directions to create your username and password.     Your access code is: [email or web address]  Expires: 3/14/2018  3:08 PM     Your access code will  in 90 days. If you need help or a new code, please call your [place] clinic or [phone].        Care EveryWhere ID     This is your Care EveryWhere ID. This could be used by other organizations to access your [place] medical records  [id number]        Your Vitals Were     Pulse Temperature Respirations Height Pulse Oximetry BMI (Body Mass Index)    83 96.8  F (36  C) (Oral) 16 5' 9.5\" (1.765 m) 94% 35.81 kg/m2       Blood Pressure from Last 3 Encounters:   17 110/60   17 130/72   17 112/62    Weight from Last 3 Encounters:   17 246 lb (111.6 kg)   17 250 lb (113.4 kg)   17 258 lb (117 kg)              We Performed the Following     ALLERGY/ASTHMA ADULT REFERRAL     XR Chest 2 Views          Today's Medication Changes          These changes are accurate as of: 17 12:34 PM.  If you have any questions, ask your nurse or doctor.               Start taking these medicines.        Dose/Directions    azithromycin 250 MG tablet   Commonly known as:  ZITHROMAX   Used for:  Acute bronchitis, unspecified organism   Started by:  Deborah Leslie MD        Two tablets first day, then one tablet daily for four days.   Quantity:  6 tablet   Refills:  0         These medicines have changed or have updated prescriptions.        Dose/Directions    * benzonatate 200 MG capsule   Commonly known as:  TESSALON   This may have changed:  Another medication with the same name was added. Make sure you understand how and when to take each.   Used for:  Mild intermittent asthma with exacerbation   Changed by:  Deborah Leslie MD        Dose:  200 mg   Take 1 capsule " (200 mg) by mouth 3 times daily as needed for cough   Quantity:  21 capsule   Refills:  0       * benzonatate 200 MG capsule   Commonly known as:  TESSALON   This may have changed:  You were already taking a medication with the same name, and this prescription was added. Make sure you understand how and when to take each.   Used for:  Acute bronchitis, unspecified organism   Changed by:  Deborah Leslie MD        Dose:  200 mg   Take 1 capsule (200 mg) by mouth 3 times daily as needed for cough   Quantity:  21 capsule   Refills:  0       ranitidine 150 MG tablet   Commonly known as:  ZANTAC   This may have changed:  when to take this   Used for:  Gastroesophageal reflux disease without esophagitis        Dose:  150 mg   Take 1 tablet (150 mg) by mouth 2 times daily   Quantity:  180 tablet   Refills:  3       * Notice:  This list has 2 medication(s) that are the same as other medications prescribed for you. Read the directions carefully, and ask your doctor or other care provider to review them with you.         Where to get your medicines      These medications were sent to [facility] [place]  [place] - [facility] [address]  [address], [place] [address]     Phone:  [phone]     azithromycin 250 MG tablet    benzonatate 200 MG capsule                Primary Care Provider Office Phone # Fax #    Deborah Leslie [phone] [address] [facility] [address] [facility] [address] [facility]  [place] [address]        Equal Access to Services     [facility]: Hadelise russoo Solynette, waaxda luqadaha, qaybta kaalmada jeanna, araseli fuchs. So [facility] [phone].    ATENCIÓN: Si habla español, tiene a fletcher disposición servicios gratuitos de asistencia lingüística. Llrosendo al [phone].    We comply with applicable federal civil rights laws and [place] laws. We do not discriminate on the basis of race, color, national origin, age, disability, sex, sexual orientation, or gender identity.             Thank you!     Thank you for choosing [facility]  for your care. Our goal is always to provide you with excellent care. Hearing back from our patients is one way we can continue to improve our services. Please take a few minutes to complete the written survey that you may receive in the mail after your visit with us. Thank you!             Your Updated Medication List - Protect others around you: Learn how to safely use, store and throw away your medicines at www.disposemymeds.org.          This list is accurate as of: 12/22/17 12:34 PM.  Always use your most recent med list.                   Brand Name Dispense Instructions for use Diagnosis    acetaminophen 325 MG tablet    TYLENOL    100 tablet    Take 1-2 tablets (325-650 mg) by mouth every 6 hours as needed for mild pain    Chronic pain syndrome       * albuterol (2.5 MG/3ML) 0.083% neb solution     25 vial    Take 1 vial (2.5 mg) by nebulization every 4 hours as needed for shortness of breath / dyspnea or wheezing    Intermittent asthma, uncomplicated       * albuterol 108 (90 BASE) MCG/ACT Inhaler    PROAIR HFA/PROVENTIL HFA/VENTOLIN HFA    1 Inhaler    Inhale 2 puffs into the lungs every 4 hours as needed for shortness of breath / dyspnea or wheezing    Intermittent asthma, uncomplicated       atorvastatin 10 MG tablet    LIPITOR    90 tablet    Take 1 tablet (10 mg) by mouth daily    Hyperlipidemia LDL goal <100       azithromycin 250 MG tablet    ZITHROMAX    6 tablet    Two tablets first day, then one tablet daily for four days.    Acute bronchitis, unspecified organism       B-12 1000 MCG Tbcr     100 tablet    Take 1,000 mcg by mouth daily    Pernicious anemia       * benzonatate 200 MG capsule    TESSALON    21 capsule    Take 1 capsule (200 mg) by mouth 3 times daily as needed for cough    Mild intermittent asthma with exacerbation       * benzonatate 200 MG capsule    TESSALON    21 capsule    Take 1 capsule (200 mg) by mouth 3 times  daily as needed for cough    Acute bronchitis, unspecified organism       cholecalciferol 1000 UNIT tablet    vitamin D3    100 tablet    Take 1 tablet (1,000 Units) by mouth daily    Preventative health care       Dextromethorphan-guaiFENesin  MG/5ML syrup      Take 5 mLs by mouth every 4 hours as needed for cough        glimepiride 4 MG tablet    AMARYL    180 tablet    Take 1 tablet (4 mg) by mouth 2 times daily    Uncontrolled type 2 diabetes mellitus with stage 3 chronic kidney disease, with long-term current use of insulin (H)       GLOBAL EASE INJECT PEN NEEDLES 31G X 5 MM   Generic drug:  insulin pen needle     100 each    Use daily    Type 2 diabetes mellitus with stage 3 chronic kidney disease, with long-term current use of insulin (H)       insulin glargine U-300 300 UNIT/ML injection    TOUJEO SOLOSTAR    15 mL    Inject 18 Units Subcutaneous At Bedtime    Uncontrolled type 2 diabetes mellitus with stage 3 chronic kidney disease, with long-term current use of insulin (H)       INVOKANA 100 MG tablet   Generic drug:  canagliflozin     90 tablet    Take 1 tablet (100 mg) by mouth every morning (before breakfast)        lidocaine-prilocaine cream    EMLA    5800 g    Apply topically as needed for moderate pain        losartan 25 MG tablet    COZAAR    90 tablet    Take 1 tablet (25 mg) by mouth daily    Hypertension goal BP (blood pressure) < 140/90       * order for DME     400 each    Test strips for pt's glucometer, brand as covered by insurance. Test four times daily and prn.    Type 2 diabetes mellitus with stage 3 chronic kidney disease, with long-term current use of insulin (H)       * order for DME     400 each    Lancets.  Four times daily and prn.    Type 2 diabetes mellitus with stage 3 chronic kidney disease, with long-term current use of insulin (H)       predniSONE 10 MG tablet    DELTASONE    30 tablet    40 mg daily x 3 days, 30 mg daily x 3 days, 20 mg daily x 3 days ,10 mg daily x 3  days    Mild intermittent asthma with exacerbation       ranitidine 150 MG tablet    ZANTAC    180 tablet    Take 1 tablet (150 mg) by mouth 2 times daily    Gastroesophageal reflux disease without esophagitis       sertraline 50 MG tablet    ZOLOFT    30 tablet    TAKE ONE TABLET BY MOUTH EVERY DAY    Anxiety       tamsulosin 0.4 MG capsule    FLOMAX    90 capsule    Take 1 capsule (0.4 mg) by mouth daily    Benign prostatic hyperplasia, presence of lower urinary tract symptoms unspecified, unspecified morphology       thiamine 100 MG tablet     100 tablet    Take 1 tablet (100 mg) by mouth daily    Type 2 diabetes mellitus with stage 3 chronic kidney disease, with long-term current use of insulin (H)       traZODone 50 MG tablet    DESYREL    25 tablet    TAKE ONE-HALF TO ONE TABLET BY MOUTH EACH NIGHT AT BEDTIME    Insomnia, unspecified type       * Notice:  This list has 6 medication(s) that are the same as other medications prescribed for you. Read the directions carefully, and ask your doctor or other care provider to review them with you.

## 2017-12-27 ENCOUNTER — TELEPHONE (OUTPATIENT)
Dept: FAMILY MEDICINE | Facility: CLINIC | Age: 62
End: 2017-12-27

## 2017-12-27 NOTE — LETTER
December 28, 2017          Sandra Dos Santos,  [address] [address]        Dear Sandra Dos Santos      Monitoring and managing your preventative and chronic health conditions are very important to us. Our records indicate that you have not scheduled for Asthma Control Test and Depression Check  which was recommended by Dr. Leslie      If you have received your health care elsewhere, please call the clinic so the information can be documented in your chart.    Please call [phone] or message us through your [employer] account to schedule an appointment or provide information for your chart.     Feel free to contact us if you have any questions or concerns!    I look forward to seeing you and working with you on your health care needs.     Sincerely,         Deborah Leslie MD / TOBY, MA

## 2017-12-27 NOTE — TELEPHONE ENCOUNTER
Patient was in to see Dr. Leslie and has the diagnosis of Depression and was due for a PHQ-9. He also has the diagnosis of Asthma and is due for an ACT. Please complete, thank you.

## 2018-01-17 NOTE — TELEPHONE ENCOUNTER
Called and spoke to patient. Patient did received the PHQ9 and ACT but he have not complete PHQ9 and ACT. Patient will mail PHQ9 and ACT back once it is complete.    Awaiting PHQ9 and ACT.    Tanisha George MA

## 2018-02-07 ENCOUNTER — TRANSFERRED RECORDS (OUTPATIENT)
Dept: HEALTH INFORMATION MANAGEMENT | Facility: CLINIC | Age: 63
End: 2018-02-07

## 2018-02-07 LAB
CREAT SERPL-MCNC: 1.1 MG/DL (ref 0.6–1.3)
GFR SERPL CREATININE-BSD FRML MDRD: 72 ML/MIN/1.73M2
GLUCOSE SERPL-MCNC: 103 MG/DL (ref 65–100)
HBA1C MFR BLD: 8 % (ref 4.8–6)
POTASSIUM SERPL-SCNC: 4.3 MEQ/L (ref 3.5–5.4)

## 2018-02-20 ENCOUNTER — TELEPHONE (OUTPATIENT)
Dept: FAMILY MEDICINE | Facility: CLINIC | Age: 63
End: 2018-02-20

## 2018-02-20 ENCOUNTER — NURSE TRIAGE (OUTPATIENT)
Dept: NURSING | Facility: CLINIC | Age: 63
End: 2018-02-20

## 2018-02-20 NOTE — TELEPHONE ENCOUNTER
Daughter calling and transferred from [facility] for nurse advice.  Daughter states patient has had productive cough for past 2 days and when patient coughs, it is so hard, it causes chest pain.  States afebrile.  Daughter wants earliest possible appointment at clinic with any provider.  Transferred to scheduling for appointment availability.  Additional Information    Negative: Severe difficulty breathing (e.g., struggling for each breath, speaks in single words)    Negative: Bluish lips, tongue, or face now    Negative: [1] Difficulty breathing AND [2] exposure to flames, smoke, or fumes    Negative: [1] Stridor AND [2] difficulty breathing    Negative: Sounds like a life-threatening emergency to the triager    Negative: [1] Previous asthma attacks AND [2] this feels like asthma attack    Negative: Dry (non-productive) cough (i.e., no sputum or minimal clear sputum)    Negative: Chest pain  (Exception: MILD central chest pain, present only when coughing)    Negative: Difficulty breathing    Negative: Patient sounds very sick or weak to the triager    Negative: [1] Coughed up blood AND [2] > 1 tablespoon (15 ml) (Exception: blood-tinged sputum)    Negative: Fever > 103 F (39.4 C)    Negative: [1] Fever > 101 F (38.3 C) AND [2] age > 60    Negative: [1] Fever > 101 F (38.3 C) AND [2] bedridden (e.g., nursing home patient, CVA, chronic illness, recovering from surgery)    Negative: [1] Fever > 100.5 F (38.1 C) AND [2] diabetes mellitus or weak immune system (e.g., HIV positive, cancer chemo, splenectomy, organ transplant, chronic steroids)    Negative: Wheezing is present    Negative: SEVERE coughing spells (e.g., whooping sound after coughing, vomiting after coughing)    Negative: [1] Continuous (nonstop) coughing interferes with work or school AND [2] no improvement using cough treatment per Care Advice    Negative: Coughing up tor-colored (reddish-brown) sputum    Negative: Fever present > 3 days (72  hours)    Negative: [1] Fever returns after gone for over 24 hours AND [2] symptoms worse or not improved    Negative: [1] Sinus pain (around cheekbone or eye) AND [2] present > 24 hours using nasal washes and pain meds    Negative: Earache    Negative: [1] Known COPD or other severe lung disease (i.e., bronchiectasis, cystic fibrosis, lung surgery) AND [2] worsening symptoms (i.e., increased sputum purulence or amount, increased breathing difficulty    Negative: [1] Coughed up blood-tinged sputum AND [2] more than once    Negative: Cough present > 10 days    Negative: [1] Nasal discharge AND [2] present > 10 days    Negative: Exposure to TB (Tuberculosis)    Negative: Cough (all triage questions negative)    Negative: Cough with cold symptoms (e.g., runny nose, postnasal drip, throat clearing) (all triage questions negative)    ALSO, mild central chest pain occurs only when coughing    Protocols used: COUGH - ACUTE PRODUCTIVE-ADULT-AH

## 2018-02-20 NOTE — PROGRESS NOTES
SUBJECTIVE:   Sandra Dos Santos is a 62 year old male who presents to clinic today for the following health issues:    Acute Illness   Acute illness concerns: Cough   Onset: 4 days     Fever: little bit     Chills/Sweats: YES sweat     Headache (location?): YES    Sinus Pressure:no    Conjunctivitis:  no    Ear Pain: no    Rhinorrhea: no    Congestion: YES    Sore Throat: YES     Cough: YES    Wheeze: YES    Decreased Appetite: no    Nausea: no    Vomiting: no    Diarrhea:  no    Dysuria/Freq.: no    Fatigue/Achiness: no    Sick/Strep Exposure: no     Therapies Tried and outcome: Inhaler, Cough syrup e     Cough, chest feels cold. Too much wheezing.  Head heavy sleeping too much. Fatigue  Got flu shot     Asthma Exacerbation    Was ACT completed today?    Yes    ACT Total Scores 2/21/2018   ACT TOTAL SCORE (Goal Greater than or Equal to 20) 12   In the past 12 months, how many times did you visit the emergency room for your asthma without being admitted to the hospital? 0   In the past 12 months, how many times were you hospitalized overnight because of your asthma? 0       Recent asthma triggers that patient is dealing with: upper respiratory infections    Other Medical issues:   Type 2 DM on insulin: well controlled. Following with endocrinologist.    Problem list and histories reviewed & adjusted, as indicated.  Additional history: as documented    Patient Active Problem List   Diagnosis     HYPERLIPIDEMIA LDL GOAL <100     Chronic kidney disease, stage III (moderate)     Gastroesophageal reflux disease     Hypothyroidism     Urinary hesitancy     Type 2 diabetes mellitus with stage 3 chronic kidney disease, with long-term current use of insulin (H)     Chronic pain syndrome     Morbid obesity due to excess calories (H)     Intermittent asthma, uncomplicated     Benign prostatic hyperplasia, presence of lower urinary tract symptoms unspecified, unspecified morphology     Gastroesophageal reflux disease without  "esophagitis     Hypertension goal BP (blood pressure) < 140/90     Lumbar stenosis     DDD (degenerative disc disease), lumbar     Osteoarthritis of lumbar spine, unspecified spinal osteoarthritis complication status     Facet arthropathy     Osteoarthritis of spine with radiculopathy, lumbosacral region     Other spondylosis, lumbosacral region     Aneurysm of internal carotid artery     Uncontrolled type 2 diabetes mellitus with stage 3 chronic kidney disease, with long-term current use of insulin (H)     Anxiety     Past Surgical History:   Procedure Laterality Date     ARTHROSCOPY SHOULDER RT/LT Right      JOINT REPLACEMTN, KNEE RT/LT Bilateral 2013  ,2012    Joint Replacement knee RT/LT       Social History   Substance Use Topics     Smoking status: Never Smoker     Smokeless tobacco: Never Used     Alcohol use No     Family History   Problem Relation Age of Onset     DIABETES Father      Prostate Cancer No family hx of      Colon Cancer No family hx of      Breast Cancer No family hx of            Reviewed and updated as needed this visit by clinical staff      ROS:  Constitutional, HEENT, cardiovascular, pulmonary, gi and gu systems are negative, except as otherwise noted.    OBJECTIVE:     /54  Pulse 81  Temp 98.7  F (37.1  C) (Oral)  Resp 20  Ht 5' 10.08\" (1.78 m)  Wt 261 lb (118.4 kg)  SpO2 96%  BMI 37.37 kg/m2  Body mass index is 37.37 kg/(m^2).  GENERAL: healthy, alert and no distress  NECK: no adenopathy, no asymmetry, masses, or scars and thyroid normal to palpation  RESP: lungs clear to auscultation - no rales, rhonchi or wheezes  CV: regular rate and rhythm, normal S1 S2, no S3 or S4, no murmur, click or rub, no peripheral edema and peripheral pulses strong  ABDOMEN: soft, nontender, no hepatosplenomegaly, no masses and bowel sounds normal  MS: no gross musculoskeletal defects noted, no edema    Diagnostic Test Results:  none     ASSESSMENT/PLAN:     (R05) Cough  (primary encounter " diagnosis)  Comment: Differentials: Asthma exacerbation, URI, influenza. Influenza test negative  Plan: Influenza A/B antigen    (R53.83) Fatigue, unspecified type  Comment: Due to on going illness  Plan: Influenza A/B antigen    (J45.901) Moderate asthma with exacerbation, unspecified whether persistent  Comment: The symptoms consistent with asthma exacerbation, likely provoked by a URI. He has been using his inhaler about 3 times a day but not helping. Given the persistent and frequent symptoms discussed starting a controller medication and will do a steroid taper at this time. The pathophysiology of asthma is explained. We've discussed the importance of compliance with medical regimen, and various treatment modalities such as beta agonists and inhaled steroids. The concepts of prophylactic and episodic or 'rescue' therapy has been discussed. Also reviewed Asthma Action Plan   The patient indicates understanding of these issues and knows when to call this office for help in treatment of asthma. If symptoms keep recurring will refer to asthma specialist    Plan: azithromycin (ZITHROMAX) 250 MG tablet,         predniSONE (DELTASONE) 20 MG tablet,         fluticasone (FLOVENT HFA) 110 MCG/ACT Inhaler    Follow up in 1 month or sooner with concerns  More than 50% of the time spent with patient on counseling / coordinating his care. Total appointment times was 28 minutes.   Braxton Sharma MD  [facility]

## 2018-02-21 ENCOUNTER — OFFICE VISIT (OUTPATIENT)
Dept: FAMILY MEDICINE | Facility: CLINIC | Age: 63
End: 2018-02-21
Payer: MEDICARE

## 2018-02-21 VITALS
SYSTOLIC BLOOD PRESSURE: 112 MMHG | TEMPERATURE: 98.7 F | DIASTOLIC BLOOD PRESSURE: 54 MMHG | HEART RATE: 81 BPM | WEIGHT: 261 LBS | BODY MASS INDEX: 37.37 KG/M2 | RESPIRATION RATE: 20 BRPM | HEIGHT: 70 IN | OXYGEN SATURATION: 96 %

## 2018-02-21 DIAGNOSIS — R53.83 FATIGUE, UNSPECIFIED TYPE: ICD-10-CM

## 2018-02-21 DIAGNOSIS — J45.901 MODERATE ASTHMA WITH EXACERBATION, UNSPECIFIED WHETHER PERSISTENT: ICD-10-CM

## 2018-02-21 DIAGNOSIS — R05.9 COUGH: Primary | ICD-10-CM

## 2018-02-21 LAB
FLUAV+FLUBV AG SPEC QL: NEGATIVE
FLUAV+FLUBV AG SPEC QL: NEGATIVE
SPECIMEN SOURCE: NORMAL

## 2018-02-21 PROCEDURE — 87804 INFLUENZA ASSAY W/OPTIC: CPT | Performed by: FAMILY MEDICINE

## 2018-02-21 PROCEDURE — 99214 OFFICE O/P EST MOD 30 MIN: CPT | Performed by: FAMILY MEDICINE

## 2018-02-21 RX ORDER — AZITHROMYCIN 250 MG/1
TABLET, FILM COATED ORAL
Qty: 6 TABLET | Refills: 0 | Status: SHIPPED | OUTPATIENT
Start: 2018-02-21 | End: 2018-08-02

## 2018-02-21 RX ORDER — PREDNISONE 20 MG/1
TABLET ORAL
Qty: 20 TABLET | Refills: 0 | Status: SHIPPED | OUTPATIENT
Start: 2018-02-21 | End: 2018-08-20

## 2018-02-21 RX ORDER — FLUTICASONE PROPIONATE 110 UG/1
2 AEROSOL, METERED RESPIRATORY (INHALATION) 2 TIMES DAILY
Qty: 1 INHALER | Refills: 1 | Status: SHIPPED | OUTPATIENT
Start: 2018-02-21 | End: 2018-05-22

## 2018-02-21 ASSESSMENT — ANXIETY QUESTIONNAIRES
GAD7 TOTAL SCORE: 10
IF YOU CHECKED OFF ANY PROBLEMS ON THIS QUESTIONNAIRE, HOW DIFFICULT HAVE THESE PROBLEMS MADE IT FOR YOU TO DO YOUR WORK, TAKE CARE OF THINGS AT HOME, OR GET ALONG WITH OTHER PEOPLE: SOMEWHAT DIFFICULT
7. FEELING AFRAID AS IF SOMETHING AWFUL MIGHT HAPPEN: NOT AT ALL
6. BECOMING EASILY ANNOYED OR IRRITABLE: MORE THAN HALF THE DAYS
2. NOT BEING ABLE TO STOP OR CONTROL WORRYING: MORE THAN HALF THE DAYS
1. FEELING NERVOUS, ANXIOUS, OR ON EDGE: NOT AT ALL
5. BEING SO RESTLESS THAT IT IS HARD TO SIT STILL: MORE THAN HALF THE DAYS
3. WORRYING TOO MUCH ABOUT DIFFERENT THINGS: MORE THAN HALF THE DAYS

## 2018-02-21 ASSESSMENT — PATIENT HEALTH QUESTIONNAIRE - PHQ9: 5. POOR APPETITE OR OVEREATING: MORE THAN HALF THE DAYS

## 2018-02-21 NOTE — LETTER
My Asthma Action Plan  Name: Sandra Dos Santos   YOB: 1955  Date: 2/21/2018   My doctor: Braxton Sharma MD   My clinic: [facility]        My Control Medicine: None  My Rescue Medicine: Albuterol nebulizer solution .  Albuterol (Proair/Ventolin/Proventil) inhaler .   My Asthma Severity: intermittent  Avoid your asthma triggers: .       {Is patient a child or adult?:568950}       GREEN ZONE   Good Control    I feel good    No cough or wheeze    Can work, sleep and play without asthma symptoms       Take your asthma control medicine every day.     1. If exercise triggers your asthma, take your rescue medication    15 minutes before exercise or sports, and    During exercise if you have asthma symptoms  2. Spacer to use with inhaler: If you have a spacer, make sure to use it with your inhaler             YELLOW ZONE Getting Worse  I have ANY of these:    I do not feel good    Cough or wheeze    Chest feels tight    Wake up at night   1. Keep taking your Green Zone medications  2. Start taking your rescue medicine:    every 20 minutes for up to 1 hour. Then every 4 hours for 24-48 hours.  3. If you stay in the Yellow Zone for more than 12-24 hours, contact your doctor.  4. If you do not return to the Green Zone in 12-24 hours or you get worse, start taking your oral steroid medicine if prescribed by your provider.           RED ZONE Medical Alert - Get Help  I have ANY of these:    I feel awful    Medicine is not helping    Breathing getting harder    Trouble walking or talking    Nose opens wide to breathe       1. Take your rescue medicine NOW  2. If your provider has prescribed an oral steroid medicine, start taking it NOW  3. Call your doctor NOW  4. If you are still in the Red Zone after 20 minutes and you have not reached your doctor:    Take your rescue medicine again and    Call 911 or go to the emergency room right away    See your regular doctor within 2 weeks of an Emergency  Room or Urgent Care visit for follow-up treatment.        Electronically signed by: Braxton Sharma, February 21, 2018    Annual Reminders:  Meet with Asthma Educator,  Flu Shot in the Fall, consider Pneumonia Vaccination for patients with asthma (aged 19 and older).    Pharmacy:    [facility] - [facility], [place] - [address]  JUNIOR PHARMACY - [facility] [place], [place] - [address]                    Asthma Triggers  How To Control Things That Make Your Asthma Worse    Triggers are things that make your asthma worse.  Look at the list below to help you find your triggers and what you can do about them.  You can help prevent asthma flare-ups by staying away from your triggers.      Trigger                                                          What you can do   Cigarette Smoke  Tobacco smoke can make asthma worse. Do not allow smoking in your home, car or around you.  Be sure no one smokes at a child s day care or school.  If you smoke, ask your health care provider for ways to help you quit.  Ask family members to quit too.  Ask your health care provider for a referral to Quit Plan to help you quit smoking, or call 2-595-723-PLAN.     Colds, Flu, Bronchitis  These are common triggers of asthma. Wash your hands often.  Don t touch your eyes, nose or mouth.  Get a flu shot every year.     Dust Mites  These are tiny bugs that live in cloth or carpet. They are too small to see. Wash sheets and blankets in hot water every week.   Encase pillows and mattress in dust mite proof covers.  Avoid having carpet if you can. If you have carpet, vacuum weekly.   Use a dust mask and HEPA vacuum.   Pollen and Outdoor Mold  Some people are allergic to trees, grass, or weed pollen, or molds. Try to keep your windows closed.  Limit time out doors when pollen count is high.   Ask you health care provider about taking medicine during allergy season.     Animal Dander  Some people are allergic to skin flakes,  urine or saliva from pets with fur or feathers. Keep pets with fur or feathers out of your home.    If you can t keep the pet outdoors, then keep the pet out of your bedroom.  Keep the bedroom door closed.  Keep pets off cloth furniture and away from stuffed toys.     Mice, Rats, and Cockroaches  Some people are allergic to the waste from these pests.   Cover food and garbage.  Clean up spills and food crumbs.  Store grease in the refrigerator.   Keep food out of the bedroom.   Indoor Mold  This can be a trigger if your home has high moisture. Fix leaking faucets, pipes, or other sources of water.   Clean moldy surfaces.  Dehumidify basement if it is damp and smelly.   Smoke, Strong Odors, and Sprays  These can reduce air quality. Stay away from strong odors and sprays, such as perfume, powder, hair spray, paints, smoke incense, paint, cleaning products, candles and new carpet.   Exercise or Sports  Some people with asthma have this trigger. Be active!  Ask your doctor about taking medicine before sports or exercise to prevent symptoms.    Warm up for 5-10 minutes before and after sports or exercise.     Other Triggers of Asthma  Cold air:  Cover your nose and mouth with a scarf.  Sometimes laughing or crying can be a trigger.  Some medicines and food can trigger asthma.

## 2018-02-21 NOTE — NURSING NOTE
"Chief Complaint   Patient presents with     URI     Health Maintenance     ACT, PHQ9, GAD7       Initial /54  Pulse 81  Temp 98.7  F (37.1  C) (Oral)  Resp 20  Ht 5' 10.08\" (1.78 m)  Wt 261 lb (118.4 kg)  SpO2 96%  BMI 37.37 kg/m2 Estimated body mass index is 37.37 kg/(m^2) as calculated from the following:    Height as of this encounter: 5' 10.08\" (1.78 m).    Weight as of this encounter: 261 lb (118.4 kg).  Medication Reconciliation: complete   Faye CONCEPCION MA      "

## 2018-02-21 NOTE — PATIENT INSTRUCTIONS
[facility]    If you have any questions regarding to your visit please contact your care team:       Team Purple:   Clinic Hours Telephone Number   Dr. Gi Trotter   7am-7pm  Monday - Thursday   7am-5pm  Fridays  [phone]  (Appointment scheduling available 24/7)    Questions about your Visit?   Team Line:  [phone]   Urgent Care - [place] and [facility] - 11am-9pm Monday-Friday Saturday-Sunday- 9am-5pm   [place] - 5pm-9pm Monday-Friday Saturday-Sunday- 9am-5pm  [phone] - [facility]  [phone] - [place]       What options do I have for visits at the clinic other than the traditional office visit?  To expand how we care for you, many of our providers are utilizing electronic visits (e-visits) and telephone visits, when medically appropriate, for interactions with their patients rather than a visit in the clinic.   We also offer nurse visits for many medical concerns. Just like any other service, we will bill your insurance company for this type of visit based on time spent on the phone with your provider. Not all insurance companies cover these visits. Please check with your medical insurance if this type of visit is covered. You will be responsible for any charges that are not paid by your insurance.      E-visits via [employer]:  generally incur a $35.00 fee.  Telephone visits:  Time spent on the phone: *charged based on time that is spent on the phone in increments of 10 minutes. Estimated cost:   5-10 mins $30.00   11-20 mins. $59.00   21-30 mins. $85.00     Use Informantonline[employer] (secure email communication and access to your chart) to send your primary care provider a message or make an appointment. Ask someone on your Team how to sign up for [employer].  For a Price Quote for your services, please call our Consumer Price Line at [phone].  As always, Thank you for trusting us with your health care  needs!      Faye CONCEPCION MA

## 2018-02-21 NOTE — MR AVS SNAPSHOT
After Visit Summary   2/21/2018    Sandra Dos Santos    MRN: [id number]           Patient Information     Date Of Birth          1955        Visit Information        Provider Department      2/21/2018 2:25 PM Braxton Sharma MD; [facility]        Today's Diagnoses     Cough    -  1    Fatigue, unspecified type        Moderate asthma with exacerbation, unspecified whether persistent          Care Instructions    [facility]    If you have any questions regarding to your visit please contact your care team:       Team Purple:   Clinic Hours Telephone Number   Dr. Gi Trotter   7am-7pm  Monday - Thursday   7am-5pm  Fridays  [phone]  (Appointment scheduling available 24/7)    Questions about your Visit?   Team Line:  [phone]   [facility] [place] [facility] [place] 11am-9pm Monday-Friday Saturday-Sunday- 9am-5pm   [place] - 5pm-9pm Monday-Friday Saturday-Sunday- 9am-5pm  [phone] - [facility]  [phone] - [place]       What options do I have for visits at the clinic other than the traditional office visit?  To expand how we care for you, many of our providers are utilizing electronic visits (e-visits) and telephone visits, when medically appropriate, for interactions with their patients rather than a visit in the clinic.   We also offer nurse visits for many medical concerns. Just like any other service, we will bill your insurance company for this type of visit based on time spent on the phone with your provider. Not all insurance companies cover these visits. Please check with your medical insurance if this type of visit is covered. You will be responsible for any charges that are not paid by your insurance.      E-visits via [employer]:  generally incur a $35.00 fee.  Telephone visits:  Time spent on the phone: *charged based on time that is spent on the  "phone in increments of 10 minutes. Estimated cost:   5-10 mins $30.00   11-20 mins. $59.00   21-30 mins. $85.00     Use [employer] (secure email communication and access to your chart) to send your primary care provider a message or make an appointment. Ask someone on your Team how to sign up for [employer].  For a Price Quote for your services, please call our [employer] Line at [phone].  As always, Thank you for trusting us with your health care needs!      Faye CONCEPCION MA            Follow-ups after your visit        Who to contact     If you have questions or need follow up information about today's clinic visit or your schedule please contact [facility] directly at [phone].  Normal or non-critical lab and imaging results will be communicated to you by MyChart, letter or phone within 4 business days after the clinic has received the results. If you do not hear from us within 7 days, please contact the clinic through [employer] or phone. If you have a critical or abnormal lab result, we will notify you by phone as soon as possible.  Submit refill requests through [employer] or call your pharmacy and they will forward the refill request to us. Please allow 3 business days for your refill to be completed.          Additional Information About Your Visit        [employer] Information     [employer] lets you send messages to your doctor, view your test results, renew your prescriptions, schedule appointments and more. To sign up, go to [email or web address] . Click on \"Log in\" on the left side of the screen, which will take you to the Welcome page. Then click on \"Sign up Now\" on the right side of the page.     You will be asked to enter the access code listed below, as well as some personal information. Please follow the directions to create your username and password.     Your access code is: [email or web address]  Expires: 3/14/2018  3:08 PM     Your access code will  in 90 days. If you need help or a new " "code, please call your [place] clinic or [phone].        Care EveryWhere ID     This is your Care EveryWhere ID. This could be used by other organizations to access your [place] medical records  [id number]        Your Vitals Were     Pulse Temperature Respirations Height Pulse Oximetry BMI (Body Mass Index)    81 98.7  F (37.1  C) (Oral) 20 5' 10.08\" (1.78 m) 96% 37.37 kg/m2       Blood Pressure from Last 3 Encounters:   02/21/18 112/54   12/22/17 110/60   12/14/17 130/72    Weight from Last 3 Encounters:   02/21/18 261 lb (118.4 kg)   12/22/17 246 lb (111.6 kg)   12/14/17 250 lb (113.4 kg)              We Performed the Following     Influenza A/B antigen          Today's Medication Changes          These changes are accurate as of 2/21/18  4:04 PM.  If you have any questions, ask your nurse or doctor.               Start taking these medicines.        Dose/Directions    azithromycin 250 MG tablet   Commonly known as:  ZITHROMAX   Used for:  Moderate asthma with exacerbation, unspecified whether persistent   Started by:  Braxton Sharma MD        Two tablets first day, then one tablet daily for four days.   Quantity:  6 tablet   Refills:  0       fluticasone 110 MCG/ACT Inhaler   Commonly known as:  FLOVENT HFA   Used for:  Moderate asthma with exacerbation, unspecified whether persistent   Started by:  Braxton Sharma MD        Dose:  2 puff   Inhale 2 puffs into the lungs 2 times daily   Quantity:  1 Inhaler   Refills:  1       predniSONE 20 MG tablet   Commonly known as:  DELTASONE   Used for:  Moderate asthma with exacerbation, unspecified whether persistent   Started by:  Braxton Sharma MD        Take 3 tabs (60 mg) by mouth daily x 3 days, 2 tabs (40 mg) daily x 3 days, 1 tab (20 mg) daily x 3 days, then 1/2 tab (10 mg) x 3 days.   Quantity:  20 tablet   Refills:  0            Where to get your medicines      These medications were sent to [facility], " [place] - [address]  [address] [place] [address]     Phone:  [phone]     azithromycin 250 MG tablet    fluticasone 110 MCG/ACT Inhaler    predniSONE 20 MG tablet                Primary Care Provider Office Phone # Fax #    Deborah Leslie [phone] [address] [facility]  [place] [address]        Equal Access to Services     [facility]: Hadii aad ku hadasho Soomaali, waaxda luqadaha, qaybta kaalmada adeegyada, waxay idiin hayaan adeeg kharash la'aan ah. So [facility] [place] [phone].    ATENCIÓN: Si habla espdevan, tiene a fletcher disposición servicios gratuitos de asistencia lingüística. [facility] [phone].    We comply with applicable federal civil rights laws and [place] laws. We do not discriminate on the basis of race, color, national origin, age, disability, sex, sexual orientation, or gender identity.            Thank you!     Thank you for choosing [facility]  for your care. Our goal is always to provide you with excellent care. Hearing back from our patients is one way we can continue to improve our services. Please take a few minutes to complete the written survey that you may receive in the mail after your visit with us. Thank you!             Your Updated Medication List - Protect others around you: Learn how to safely use, store and throw away your medicines at www.disposemymeds.org.          This list is accurate as of 2/21/18  4:04 PM.  Always use your most recent med list.                   Brand Name Dispense Instructions for use Diagnosis    acetaminophen 325 MG tablet    TYLENOL    100 tablet    Take 1-2 tablets (325-650 mg) by mouth every 6 hours as needed for mild pain    Chronic pain syndrome       * albuterol (2.5 MG/3ML) 0.083% neb solution     25 vial    Take 1 vial (2.5 mg) by nebulization every 4 hours as needed for shortness of breath / dyspnea or wheezing    Intermittent asthma, uncomplicated       * albuterol 108 (90 BASE) MCG/ACT Inhaler    PROAIR  HFA/PROVENTIL HFA/VENTOLIN HFA    1 Inhaler    Inhale 2 puffs into the lungs every 4 hours as needed for shortness of breath / dyspnea or wheezing    Intermittent asthma, uncomplicated       ASPIRIN NOT PRESCRIBED    INTENTIONAL     Antiplatelet medication not prescribed intentionally due to possible aneurysm seen on CT        atorvastatin 10 MG tablet    LIPITOR    90 tablet    Take 1 tablet (10 mg) by mouth daily    Hyperlipidemia LDL goal <100       azithromycin 250 MG tablet    ZITHROMAX    6 tablet    Two tablets first day, then one tablet daily for four days.    Moderate asthma with exacerbation, unspecified whether persistent       B-12 1000 MCG Tbcr     30 tablet    TAKE 1 TABLETS BY MOUTH EVERY DAY    Pernicious anemia       cholecalciferol 1000 UNIT tablet    vitamin D3    100 tablet    Take 1 tablet (1,000 Units) by mouth daily    Preventative health care       fluticasone 110 MCG/ACT Inhaler    FLOVENT HFA    1 Inhaler    Inhale 2 puffs into the lungs 2 times daily    Moderate asthma with exacerbation, unspecified whether persistent       glimepiride 4 MG tablet    AMARYL    180 tablet    Take 1 tablet (4 mg) by mouth 2 times daily    Uncontrolled type 2 diabetes mellitus with stage 3 chronic kidney disease, with long-term current use of insulin (H)       GLOBAL EASE INJECT PEN NEEDLES 31G X 5 MM   Generic drug:  insulin pen needle     100 each    Use daily        insulin glargine U-300 300 UNIT/ML injection    TOUJEO SOLOSTAR    15 mL    Inject 18 Units Subcutaneous At Bedtime    Uncontrolled type 2 diabetes mellitus with stage 3 chronic kidney disease, with long-term current use of insulin (H)       INVOKANA 100 MG tablet   Generic drug:  canagliflozin     90 tablet    Take 1 tablet (100 mg) by mouth every morning (before breakfast)        lidocaine-prilocaine cream    EMLA    5800 g    Apply topically as needed for moderate pain        losartan 25 MG tablet    COZAAR    90 tablet    Take 1 tablet (25  mg) by mouth daily    Hypertension goal BP (blood pressure) < 140/90       * order for DME     400 each    Test strips for pt's glucometer, brand as covered by insurance. Test four times daily and prn.    Type 2 diabetes mellitus with stage 3 chronic kidney disease, with long-term current use of insulin (H)       * order for DME     400 each    Lancets.  Four times daily and prn.    Type 2 diabetes mellitus with stage 3 chronic kidney disease, with long-term current use of insulin (H)       predniSONE 20 MG tablet    DELTASONE    20 tablet    Take 3 tabs (60 mg) by mouth daily x 3 days, 2 tabs (40 mg) daily x 3 days, 1 tab (20 mg) daily x 3 days, then 1/2 tab (10 mg) x 3 days.    Moderate asthma with exacerbation, unspecified whether persistent       ranitidine 150 MG tablet    ZANTAC    180 tablet    Take 1 tablet (150 mg) by mouth 2 times daily    Gastroesophageal reflux disease without esophagitis       * Notice:  This list has 4 medication(s) that are the same as other medications prescribed for you. Read the directions carefully, and ask your doctor or other care provider to review them with you.

## 2018-02-22 ASSESSMENT — ASTHMA QUESTIONNAIRES: ACT_TOTALSCORE: 12

## 2018-02-22 ASSESSMENT — ANXIETY QUESTIONNAIRES: GAD7 TOTAL SCORE: 10

## 2018-02-22 ASSESSMENT — PATIENT HEALTH QUESTIONNAIRE - PHQ9: SUM OF ALL RESPONSES TO PHQ QUESTIONS 1-9: 0

## 2018-03-14 ENCOUNTER — TRANSFERRED RECORDS (OUTPATIENT)
Dept: HEALTH INFORMATION MANAGEMENT | Facility: CLINIC | Age: 63
End: 2018-03-14

## 2018-03-14 LAB
ALT SERPL-CCNC: 26 U/L (ref 12–78)
AST SERPL-CCNC: 17 U/L (ref 15–37)
GLUCOSE SERPL-MCNC: 231 MG/DL (ref 65–100)
HBA1C MFR BLD: 8.4 % (ref 4.8–6)
POTASSIUM SERPL-SCNC: 4.4 MEQ/L (ref 3.5–5.4)

## 2018-03-21 ENCOUNTER — TRANSFERRED RECORDS (OUTPATIENT)
Dept: HEALTH INFORMATION MANAGEMENT | Facility: CLINIC | Age: 63
End: 2018-03-21

## 2018-03-30 DIAGNOSIS — E11.22 TYPE 2 DIABETES MELLITUS WITH STAGE 3 CHRONIC KIDNEY DISEASE, WITH LONG-TERM CURRENT USE OF INSULIN (H): ICD-10-CM

## 2018-03-30 DIAGNOSIS — N18.30 TYPE 2 DIABETES MELLITUS WITH STAGE 3 CHRONIC KIDNEY DISEASE, WITH LONG-TERM CURRENT USE OF INSULIN (H): ICD-10-CM

## 2018-03-30 DIAGNOSIS — Z79.4 TYPE 2 DIABETES MELLITUS WITH STAGE 3 CHRONIC KIDNEY DISEASE, WITH LONG-TERM CURRENT USE OF INSULIN (H): ICD-10-CM

## 2018-03-30 NOTE — TELEPHONE ENCOUNTER
Prescription approved per [facility] Refill Protocol.    Marisol Pandey RN

## 2018-04-21 ENCOUNTER — TRANSFERRED RECORDS (OUTPATIENT)
Dept: HEALTH INFORMATION MANAGEMENT | Facility: CLINIC | Age: 63
End: 2018-04-21

## 2018-04-23 ENCOUNTER — OFFICE VISIT (OUTPATIENT)
Dept: FAMILY MEDICINE | Facility: CLINIC | Age: 63
End: 2018-04-23
Payer: MEDICARE

## 2018-04-23 ENCOUNTER — TELEPHONE (OUTPATIENT)
Dept: CARE COORDINATION | Facility: CLINIC | Age: 63
End: 2018-04-23

## 2018-04-23 VITALS
WEIGHT: 256 LBS | DIASTOLIC BLOOD PRESSURE: 74 MMHG | HEART RATE: 71 BPM | SYSTOLIC BLOOD PRESSURE: 126 MMHG | TEMPERATURE: 97.1 F | BODY MASS INDEX: 36.65 KG/M2 | OXYGEN SATURATION: 93 % | RESPIRATION RATE: 16 BRPM

## 2018-04-23 DIAGNOSIS — Z71.89 COMPLEX CARE COORDINATION: Primary | ICD-10-CM

## 2018-04-23 DIAGNOSIS — M48.061 SPINAL STENOSIS OF LUMBAR REGION, UNSPECIFIED WHETHER NEUROGENIC CLAUDICATION PRESENT: ICD-10-CM

## 2018-04-23 DIAGNOSIS — M51.369 DDD (DEGENERATIVE DISC DISEASE), LUMBAR: ICD-10-CM

## 2018-04-23 DIAGNOSIS — G89.4 CHRONIC PAIN SYNDROME: Primary | ICD-10-CM

## 2018-04-23 PROCEDURE — 99213 OFFICE O/P EST LOW 20 MIN: CPT | Performed by: INTERNAL MEDICINE

## 2018-04-23 RX ORDER — OXYCODONE HYDROCHLORIDE 5 MG/1
5 TABLET ORAL EVERY 6 HOURS PRN
Qty: 14 TABLET | Refills: 0 | Status: SHIPPED | OUTPATIENT
Start: 2018-04-23

## 2018-04-23 RX ORDER — METHOCARBAMOL 500 MG/1
1000 TABLET, FILM COATED ORAL
COMMUNITY
Start: 2018-04-21 | End: 2018-04-24

## 2018-04-23 RX ORDER — OXYCODONE HYDROCHLORIDE 5 MG/1
5 TABLET ORAL
COMMUNITY
Start: 2018-04-21 | End: 2018-04-23

## 2018-04-23 RX ORDER — ACETAMINOPHEN 325 MG/1
650 TABLET ORAL
COMMUNITY
Start: 2018-04-21 | End: 2018-08-20

## 2018-04-23 NOTE — PATIENT INSTRUCTIONS
[facility]    If you have any questions regarding to your visit please contact your care team:     Team Pink:   Clinic Hours Telephone Number   Internal Medicine:  Dr. Tasha Chand NP       7am-7pm  Monday - Thursday   7am-5pm  Fridays  [phone]  (Appointment scheduling available 24/7)    Questions about your recent visit?  Team Line  [phone]   Ur[facility] - [place] [facility] - 11am-9pm Monday-Friday Saturday-Sunday- 9am-5pm   [place] - 5pm-9pm Monday-Friday Saturday-Sunday- 9am-5pm  [phone] - [place]  [phone] - [place]       What options do I have for a visit other than an office visit? We offer electronic visits (e-visits) and telephone visits, when medically appropriate.  Please check with your medical insurance to see if these types of visits are covered, as you will be responsible for any charges that are not paid by your insurance.      You can use [employer] (secure electronic communication) to access to your chart, send your primary care provider a message, or make an appointment. Ask a team member how to get started.     For a price quote for your services, please call our Consumer Price Line at [phone] or our Imaging Cost estimation line at [phone] (for imaging tests).  Ita DWYER CMA ([facility])

## 2018-04-23 NOTE — PROGRESS NOTES
SUBJECTIVE:   Sandra Dos Santos is a 63 year old male who presents to clinic today with his daughter for the following health issues:      ED/UC Followup:    Facility:  [facility]   Date of visit: 04/21/2018  Reason for visit: lower back pain  Current Status: still in extreme pain, pain radiating left leg and hip       Back Pain: Patient is here for a post emergency room evaluation follow up office visit . Was recently seen with [facility] emergency room due to a recent fall while getting out of the car 6 days ago. He has been having back pain on both sides ever since although he does have chronic low back pain issues. The pain on the left side has been traveling from his hip and radiating down his leg. The pain on the right side seems to be stationary to his hip. There was no immediate pain after slipping and falling.He initially tried to check into an Urgent care facility in [place] and was turned around. The pain has slowly increased to the point he had to check himself into the Emergency Room. There was no findings in the X ray he was given in the Hospital . He was prescribe medication including non-steroidal antiinflammatory Diclofenac (VOLTAREN) 1 % GEL, acetaminophen, flexeril [ cyclobenzaprine ] and limited oxyCODONE (ROXICODONE)  for the pain and told to see his Primary Provider if the pain persisted. He is currently also taking lyrica for general muscle pain. He legs and feet may have some swelling because he cannot put his shoes on. In the past,  he has taken pain injections in his back. He states that the pain is nothing like before.    Problem lis t and histories reviewed & adjusted, as indicated.  Additional history: as documented    Patient Active Problem List   Diagnosis     HYPERLIPIDEMIA LDL GOAL <100     Chronic kidney disease, stage III (moderate)     Gastroesophageal reflux disease     Hypothyroidism     Urinary hesitancy     Type 2 diabetes mellitus with stage 3 chronic kidney  disease, with long-term current use of insulin (H)     Chronic pain syndrome     Morbid obesity due to excess calories (H)     Intermittent asthma, uncomplicated     Benign prostatic hyperplasia, presence of lower urinary tract symptoms unspecified, unspecified morphology     Gastroesophageal reflux disease without esophagitis     Hypertension goal BP (blood pressure) < 140/90     Lumbar stenosis     DDD (degenerative disc disease), lumbar     Osteoarthritis of lumbar spine, unspecified spinal osteoarthritis complication status     Facet arthropathy     Osteoarthritis of spine with radiculopathy, lumbosacral region     Other spondylosis, lumbosacral region     Aneurysm of internal carotid artery     Uncontrolled type 2 diabetes mellitus with stage 3 chronic kidney disease, with long-term current use of insulin (H)     Anxiety     Past Surgical History:   Procedure Laterality Date     ARTHROSCOPY SHOULDER RT/LT Right      JOINT REPLACEMTN, KNEE RT/LT Bilateral 2013  ,2012    Joint Replacement knee RT/LT       Social History   Substance Use Topics     Smoking status: Never Smoker     Smokeless tobacco: Never Used     Alcohol use No     Family History   Problem Relation Age of Onset     DIABETES Father      Prostate Cancer No family hx of      Colon Cancer No family hx of      Breast Cancer No family hx of          Current Outpatient Prescriptions   Medication Sig Dispense Refill     acetaminophen (TYLENOL) 325 MG tablet Take 1-2 tablets (325-650 mg) by mouth every 6 hours as needed for mild pain 100 tablet 1     acetaminophen (TYLENOL) 325 MG tablet Take 650 mg by mouth       albuterol (2.5 MG/3ML) 0.083% neb solution Take 1 vial (2.5 mg) by nebulization every 4 hours as needed for shortness of breath / dyspnea or wheezing 25 vial 1     albuterol (PROAIR HFA/PROVENTIL HFA/VENTOLIN HFA) 108 (90 BASE) MCG/ACT Inhaler Inhale 2 puffs into the lungs every 4 hours as needed for shortness of breath / dyspnea or wheezing 1  Inhaler 11     ASPIRIN NOT PRESCRIBED (INTENTIONAL) Antiplatelet medication not prescribed intentionally due to possible aneurysm seen on CT       atorvastatin (LIPITOR) 10 MG tablet Take 1 tablet (10 mg) by mouth daily 90 tablet 2     azithromycin (ZITHROMAX) 250 MG tablet Two tablets first day, then one tablet daily for four days. 6 tablet 0     cholecalciferol (VITAMIN D) 1000 UNIT tablet Take 1 tablet (1,000 Units) by mouth daily 100 tablet 3     Cyanocobalamin (B-12) 1000 MCG TBCR TAKE 1 TABLETS BY MOUTH EVERY DAY 30 tablet 8     diclofenac (VOLTAREN) 1 % GEL topical gel        fluticasone (FLOVENT HFA) 110 MCG/ACT Inhaler Inhale 2 puffs into the lungs 2 times daily 1 Inhaler 1     glimepiride (AMARYL) 4 MG tablet TAKE 1 TABLET (4 MG) BY MOUTH 2 TIMES DAILY 60 tablet 0     GLOBAL EASE INJECT PEN NEEDLES 31G X 5 MM Use daily 100 each 3     insulin glargine U-300 (TOUJEO SOLOSTAR) 300 UNIT/ML injection Inject 18 Units Subcutaneous At Bedtime 15 mL 0     INVOKANA 100 MG tablet Take 1 tablet (100 mg) by mouth every morning (before breakfast) 90 tablet 0     lidocaine-prilocaine (EMLA) cream Apply topically as needed for moderate pain 5800 g 1     losartan (COZAAR) 25 MG tablet Take 1 tablet (25 mg) by mouth daily 90 tablet 3     methocarbamol (ROBAXIN) 500 MG tablet Take 1,000 mg by mouth       order for DME Lancets.  Four times daily and prn. 400 each 4     order for DME Test strips for pt's glucometer, brand as covered by insurance. Test four times daily and prn. 400 each 4     oxyCODONE IR (ROXICODONE) 5 MG tablet Take 5 mg by mouth       predniSONE (DELTASONE) 20 MG tablet Take 3 tabs (60 mg) by mouth daily x 3 days, 2 tabs (40 mg) daily x 3 days, 1 tab (20 mg) daily x 3 days, then 1/2 tab (10 mg) x 3 days. 20 tablet 0     ranitidine (ZANTAC) 150 MG tablet Take 1 tablet (150 mg) by mouth 2 times daily 180 tablet 3     Labs reviewed in EPIC    Reviewed and updated as needed this visit by clinical staff  Tobacco   Allergies  Meds  Med Hx  Surg Hx  Fam Hx  Soc Hx      Reviewed and updated as needed this visit by Provider         ROS:  Constitutional, HEENT, cardiovascular, pulmonary, GI, , musculoskeletal, neuro, skin, endocrine and psych systems are negative, except as otherwise noted.  This document serves as a record of the services and decisions personally performed and made by Stanislav Ortega MD. It was created on his/her behalf by Cullen Copeland, trained medical scribe. The creation of this document is based the provider's statements to the medical scribes.    Scribe Cullen Copeland 3:25 PM, April 23, 2018    OBJECTIVE:     /74  Pulse 71  Temp 97.1  F (36.2  C) (Oral)  Resp 16  Wt 116.1 kg (256 lb)  SpO2 93%  BMI 36.65 kg/m2  Body mass index is 36.65 kg/(m^2).  GENERAL: healthy, alert and no distress  NEURO: Normal strength and tone, mentation intact and speech normal  BACK: No evidence for trochanteric bursitis , neg straight leg raising. Negative for sciatica, mainly severe tenderness to palpation with the lumbar paraspinous muscles . Patient is very slow with movements and needs help when he lays backwards onto the exam table  PSYCH: mentation appears normal, affect normal/bright    Diagnostic Test Results:  No results found for this or any previous visit (from the past 24 hour(s)).    ASSESSMENT/PLAN:   1. Chronic pain syndrome  This patient needs to get going with a trial of physical therapy, we've got to get him moving. This is how we will get past the severity of his stiffness and loss of mobility  - [facility] PT, HAND, AND CHIROPRACTIC REFERRAL  - oxyCODONE IR (ROXICODONE) 5 MG tablet; Take 1 tablet (5 mg) by mouth every 6 hours as needed for severe pain  Dispense: 14 tablet; Refill: 0    2. DDD (degenerative disc disease), lumbar  As above   - [facility] PT, HAND, AND CHIROPRACTIC REFERRAL  - oxyCODONE IR (ROXICODONE) 5 MG tablet; Take 1 tablet (5 mg) by mouth every 6 hours as needed for severe pain   Dispense: 14 tablet; Refill: 0    3. Spinal stenosis of lumbar region, unspecified whether neurogenic claudication present  As above   - [facility] PT, HAND, AND CHIROPRACTIC REFERRAL  - oxyCODONE IR (ROXICODONE) 5 MG tablet; Take 1 tablet (5 mg) by mouth every 6 hours as needed for severe pain  Dispense: 14 tablet; Refill: 0    Patient Instructions     [facility]    If you have any questions regarding to your visit please contact your care team:     Team Pink:   Clinic Hours Telephone Number   Internal Medicine:  Dr. Tasha Chand, NP       7am-7pm  Monday - Thursday   7am-5pm  Fridays  [phone]  (Appointment scheduling available 24/7)    Questions about your recent visit?  Team Line  [phone]   Urgent Care - [place] and [facility] - 11am-9pm Monday-Friday Saturday-Sunday- 9am-5pm   [place] - 5pm-9pm Monday-Friday Saturday-Sunday- 9am-5pm  [phone] - [place]  [phone] - [place]       What options do I have for a visit other than an office visit? We offer electronic visits (e-visits) and telephone visits, when medically appropriate.  Please check with your medical insurance to see if these types of visits are covered, as you will be responsible for any charges that are not paid by your insurance.      You can use [employer] (secure electronic communication) to access to your chart, send your primary care provider a message, or make an appointment. Ask a team member how to get started.     For a price quote for your services, please call our Consumer Price Line at [phone] or our Imaging Cost estimation line at [phone] (for imaging tests).  Ita DWYER CMA ([facility])            The information in this document, created by the medical scribe, Cullen Copeland, for me, accurately reflects the services I personally performed and the decisions made by me. I have reviewed and approved this document for accuracy prior to leaving the  patient care area.    Stanislav Ortega MD  [facility]

## 2018-04-23 NOTE — MR AVS SNAPSHOT
After Visit Summary   4/23/2018    Sandra Dos Santos    MRN: [id number]           Patient Information     Date Of Birth          1955        Visit Information        Provider Department      4/23/2018 3:00 PM Stanislav Ortega MD; PHONE,  [facility]        Today's Diagnoses     Chronic pain syndrome    -  1    DDD (degenerative disc disease), lumbar        Spinal stenosis of lumbar region, unspecified whether neurogenic claudication present          Care Instructions    [facility]    If you have any questions regarding to your visit please contact your care team:     Team Pink:   Clinic Hours Telephone Number   Internal Medicine:  Dr. Tasha Chand, NP       7am-7pm  Monday - Thursday   7am-5pm  Fridays  [phone]  (Appointment scheduling available 24/7)    Questions about your recent visit?  Team Line  [phone]   Urgent Care - [place] and [facility] - 11am-9pm Monday-Friday Saturday-Sunday- 9am-5pm   [place] - 5pm-9pm Monday-Friday Saturday-Sunday- 9am-5pm  [phone] - [place]  [phone] - [place]       What options do I have for a visit other than an office visit? We offer electronic visits (e-visits) and telephone visits, when medically appropriate.  Please check with your medical insurance to see if these types of visits are covered, as you will be responsible for any charges that are not paid by your insurance.      You can use [employer] (secure electronic communication) to access to your chart, send your primary care provider a message, or make an appointment. Ask a team member how to get started.     For a price quote for your services, please call our Consumer Price Line at [phone] or our Imaging Cost estimation line at [phone] (for imaging tests).  Ita DWYER CMA ([facility])            Follow-ups after your visit        Additional Services     [facility] PT, HAND, AND CHIROPRACTIC  REFERRAL       **This order will print in the [facility] Scheduling Office**    Physical Therapy, Hand Therapy and Chiropractic Care are available through:    *[facility]  *[facility]  *[facility]    Call one number to schedule at any of the above locations: [phone].    Your provider has referred you to: Physical Therapy at [facility] or [facility]    Indication/Reason for Referral: Low Back Pain  Onset of Illness:   Therapy Orders: Evaluate and Treat  Special Programs: Spine Care/[facility]  Special Request:   None    Vladimir Gilbert      Additional Comments for the Therapist or Chiropractor:     Please be aware that coverage of these services is subject to the terms and limitations of your health insurance plan.  Call member services at your health plan with any benefit or coverage questions.      Please bring the following to your appointment:    *Your personal calendar for scheduling future appointments  *Comfortable clothing                  Who to contact     If you have questions or need follow up information about today's clinic visit or your schedule please contact [facility] directly at [phone].  Normal or non-critical lab and imaging results will be communicated to you by MyChart, letter or phone within 4 business days after the clinic has received the results. If you do not hear from us within 7 days, please contact the clinic through [employer] or phone. If you have a critical or abnormal lab result, we will notify you by phone as soon as possible.  Submit refill requests through [employer] or call your pharmacy and they will forward the refill request to us. Please allow 3 business days for your refill to be completed.          Additional Information About Your Visit        [employer] Information     [employer] lets you send messages to your doctor, view your test results, renew your prescriptions, schedule appointments and more. To sign up, go to  "[email or web address] . Click on \"Log in\" on the left side of the screen, which will take you to the Welcome page. Then click on \"Sign up Now\" on the right side of the page.     You will be asked to enter the access code listed below, as well as some personal information. Please follow the directions to create your username and password.     Your access code is: [email or web address]  Expires: 2018  3:45 PM     Your access code will  in 90 days. If you need help or a new code, please call your [facility] clinic or [phone].        Care EveryWhere ID     This is your Care EveryWhere ID. This could be used by other organizations to access your [facility] medical records  [id number]        Your Vitals Were     Pulse Temperature Respirations Pulse Oximetry BMI (Body Mass Index)       71 97.1  F (36.2  C) (Oral) 16 93% 36.65 kg/m2        Blood Pressure from Last 3 Encounters:   18 126/74   18 112/54   17 110/60    Weight from Last 3 Encounters:   18 256 lb (116.1 kg)   18 261 lb (118.4 kg)   17 246 lb (111.6 kg)              We Performed the Following     [facility] PT, HAND, AND CHIROPRACTIC REFERRAL          Today's Medication Changes          These changes are accurate as of 18  3:46 PM.  If you have any questions, ask your nurse or doctor.               These medicines have changed or have updated prescriptions.        Dose/Directions    oxyCODONE IR 5 MG tablet   Commonly known as:  ROXICODONE   This may have changed:    - when to take this  - reasons to take this   Used for:  Chronic pain syndrome, DDD (degenerative disc disease), lumbar, Spinal stenosis of lumbar region, unspecified whether neurogenic claudication present   Changed by:  Stanislav Ortega MD        Dose:  5 mg   Take 1 tablet (5 mg) by mouth every 6 hours as needed for severe pain   Quantity:  14 tablet   Refills:  0            Where to get your medicines      Some of these will need a paper prescription and " others can be bought over the counter.  Ask your nurse if you have questions.     Bring a paper prescription for each of these medications     oxyCODONE IR 5 MG tablet               Information about OPIOIDS     PRESCRIPTION OPIOIDS: WHAT YOU NEED TO KNOW   You have a prescription for an opioid (narcotic) pain medicine. Opioids can cause addiction. If you have a history of chemical dependency of any type, you are at a higher risk of becoming addicted to opioids. Only take this medicine after all other options have been tried. Take it for as short a time and as few doses as possible.     Do not:    Drive. If you drive while taking these medicines, you could be arrested for driving under the influence (DUI).    Operate heavy machinery    Do any other dangerous activities while taking these medicines.     Drink any alcohol while taking these medicines.      Take with any other medicines that contain acetaminophen. Read all labels carefully. Look for the word  acetaminophen  or  Tylenol.  Ask your pharmacist if you have questions or are unsure.    Store your pills in a secure place, locked if possible. We will not replace any lost or stolen medicine. If you don t finish your medicine, please throw away (dispose) as directed by your pharmacist. The [place] Pollution Control Agency has more information about safe disposal: https://www.pca.[facility].mn.us/living-green/managing-unwanted-medications    All opioids tend to cause constipation. Drink plenty of water and eat foods that have a lot of fiber, such as fruits, vegetables, prune juice, apple juice and high-fiber cereal. Take a laxative (Miralax, milk of magnesia, Colace, Senna) if you don t move your bowels at least every other day.          Primary Care Provider Office Phone # Fax #    Deborah Leslie [phone] [address] [facility] [address]        Equal Access to Services     ELISEO RO AH: soo Berkowitz  ines francescanarinderriky lucianofernanda meeks, araseli liao ah. So [facility] [phone].    ATENCIÓN: Si damon mendoza, tiene a fletcher disposición servicios gratuitos de asistencia lingüística. Radha al [phone].    We comply with applicable federal civil rights laws and [place] laws. We do not discriminate on the basis of race, color, national origin, age, disability, sex, sexual orientation, or gender identity.            Thank you!     [facility] you for [facility]  [facility] your [facility]. Our goal is always to provide you with excellent care. Hearing back from our patients is one way we can continue to improve our services. Please take a few minutes to complete the written survey that you may receive in the mail after your visit with us. Thank you!             Your Updated Medication List - Protect others around you: Learn how to safely use, store and throw away your medicines at www.disposemymeds.org.          This list is accurate as of 4/23/18  3:46 PM.  Always use your most recent med list.                   Brand Name Dispense Instructions for use Diagnosis    * acetaminophen 325 MG tablet    TYLENOL    100 tablet    Take 1-2 tablets (325-650 mg) by mouth every 6 hours as needed for mild pain    Chronic pain syndrome       * acetaminophen 325 MG tablet    TYLENOL     Take 650 mg by mouth        * albuterol (2.5 MG/3ML) 0.083% neb solution     25 vial    Take 1 vial (2.5 mg) by nebulization every 4 hours as needed for shortness of breath / dyspnea or wheezing    Intermittent asthma, uncomplicated       * albuterol 108 (90 Base) MCG/ACT Inhaler    PROAIR HFA/PROVENTIL HFA/VENTOLIN HFA    1 Inhaler    Inhale 2 puffs into the lungs every 4 hours as needed for shortness of breath / dyspnea or wheezing    Intermittent asthma, uncomplicated       ASPIRIN NOT PRESCRIBED    INTENTIONAL     Antiplatelet medication not prescribed intentionally due to possible aneurysm seen on CT         atorvastatin 10 MG tablet    LIPITOR    90 tablet    Take 1 tablet (10 mg) by mouth daily    Hyperlipidemia LDL goal <100       azithromycin 250 MG tablet    ZITHROMAX    6 tablet    Two tablets first day, then one tablet daily for four days.    Moderate asthma with exacerbation, unspecified whether persistent       B-12 1000 MCG Tbcr     30 tablet    TAKE 1 TABLETS BY MOUTH EVERY DAY    Pernicious anemia       cholecalciferol 1000 UNIT tablet    vitamin D3    100 tablet    Take 1 tablet (1,000 Units) by mouth daily    Preventative health care       diclofenac 1 % Gel topical gel    VOLTAREN          fluticasone 110 MCG/ACT Inhaler    FLOVENT HFA    1 Inhaler    Inhale 2 puffs into the lungs 2 times daily    Moderate asthma with exacerbation, unspecified whether persistent       glimepiride 4 MG tablet    AMARYL    60 tablet    TAKE 1 TABLET (4 MG) BY MOUTH 2 TIMES DAILY    Uncontrolled type 2 diabetes mellitus with stage 3 chronic kidney disease, with long-term current use of insulin (H)       GLOBAL EASE INJECT PEN NEEDLES 31G X 5 MM   Generic drug:  insulin pen needle     100 each    Use daily        insulin glargine U-300 300 UNIT/ML injection    TOUJEO SOLOSTAR    15 mL    Inject 18 Units Subcutaneous At Bedtime    Uncontrolled type 2 diabetes mellitus with stage 3 chronic kidney disease, with long-term current use of insulin (H)       INVOKANA 100 MG tablet   Generic drug:  canagliflozin     90 tablet    Take 1 tablet (100 mg) by mouth every morning (before breakfast)        lidocaine-prilocaine cream    EMLA    5800 g    Apply topically as needed for moderate pain        losartan 25 MG tablet    COZAAR    90 tablet    Take 1 tablet (25 mg) by mouth daily    Hypertension goal BP (blood pressure) < 140/90       methocarbamol 500 MG tablet    ROBAXIN     Take 1,000 mg by mouth        order for DME     400 each    Lancets.  Four times daily and prn.    Type 2 diabetes mellitus with stage 3 chronic kidney disease,  with long-term current use of insulin (H)       order for DME     400 each    Test strips for pt's glucometer, brand as covered by insurance. Test four times daily and prn.    Type 2 diabetes mellitus with stage 3 chronic kidney disease, with long-term current use of insulin (H)       oxyCODONE IR 5 MG tablet    ROXICODONE    14 tablet    Take 1 tablet (5 mg) by mouth every 6 hours as needed for severe pain    Chronic pain syndrome, DDD (degenerative disc disease), lumbar, Spinal stenosis of lumbar region, unspecified whether neurogenic claudication present       predniSONE 20 MG tablet    DELTASONE    20 tablet    Take 3 tabs (60 mg) by mouth daily x 3 days, 2 tabs (40 mg) daily x 3 days, 1 tab (20 mg) daily x 3 days, then 1/2 tab (10 mg) x 3 days.    Moderate asthma with exacerbation, unspecified whether persistent       ranitidine 150 MG tablet    ZANTAC    180 tablet    Take 1 tablet (150 mg) by mouth 2 times daily    Gastroesophageal reflux disease without esophagitis       * Notice:  This list has 4 medication(s) that are the same as other medications prescribed for you. Read the directions carefully, and ask your doctor or other care provider to review them with you.

## 2018-04-23 NOTE — TELEPHONE ENCOUNTER
DC'd from [facility] on 4-21-18 to home self care   Primary Problem: er discharge  LACE: 53 moderate

## 2018-04-24 ENCOUNTER — PATIENT OUTREACH (OUTPATIENT)
Dept: CARE COORDINATION | Facility: CLINIC | Age: 63
End: 2018-04-24

## 2018-04-24 ENCOUNTER — TRANSFERRED RECORDS (OUTPATIENT)
Dept: HEALTH INFORMATION MANAGEMENT | Facility: CLINIC | Age: 63
End: 2018-04-24

## 2018-04-24 NOTE — LETTER
Health Care Home - Access Care Plan  About Me  Patient Name:  Sandra Dos Santos  YOB: 1955  Age:                             63 year old   [employer] MRN:            [id number] Telephone Information:     Home Phone [phone]   Mobile [phone]       Address:    [address] [place] [address] Email address:  No e-mail address on record      Emergency Contact(s)  Name Relationship Lgl Grd Work Phone Home Phone Mobile Phone   1RANJIT JAEGER Son   [phone]    2. NO SECONDARY C*    none         My Access Plan  Medical Emergency 911   Questions or concerns during clinic hours Primary Clinic Line, I will call the clinic directly:   -     24 Hour Appointment Line [phone] or  [phone] [phone]) (toll free)   24 Hour Nurse Line [phone] (toll free)   Questions or concerns outside clinic hours 24 Hour Appointment Line, I will call the after-hours on-call line:   [facility] [phone] or [phone]) (toll-free)   Preferred Urgent Care     [facility]  [phone]   Preferred Pharmacy [address]     Behavioral Health Crisis Line The National Suicide Prevention Lifeline at 1-496.374.1126 or 917                   My Care Team Members  Patient Care Team       Relationship Specialty Notifications Start End    Deborah Leslie MD PCP - General Family Practice  9/26/17     Phone: [phone] Fax: [phone]         [address] [facility] [address]    Amish Frederick, RN Lead Care Coordinator Primary Care - CC  4/24/18     Phone: [phone]                My Medical and Care Information  Problem List   Patient Active Problem List   Diagnosis     HYPERLIPIDEMIA LDL GOAL <100     Chronic kidney disease, stage III (moderate)     Gastroesophageal reflux disease     Hypothyroidism     Urinary hesitancy     Type 2 diabetes mellitus with stage 3 chronic  kidney disease, with long-term current use of insulin (H)     Chronic pain syndrome     Morbid obesity due to excess calories (H)     Intermittent asthma, uncomplicated     Benign prostatic hyperplasia, presence of lower urinary tract symptoms unspecified, unspecified morphology     Gastroesophageal reflux disease without esophagitis     Hypertension goal BP (blood pressure) < 140/90     Lumbar stenosis     DDD (degenerative disc disease), lumbar     Osteoarthritis of lumbar spine, unspecified spinal osteoarthritis complication status     Facet arthropathy     Osteoarthritis of spine with radiculopathy, lumbosacral region     Other spondylosis, lumbosacral region     Aneurysm of internal carotid artery     Uncontrolled type 2 diabetes mellitus with stage 3 chronic kidney disease, with long-term current use of insulin (H)     Anxiety      Current Medications and Allergies:  See printed Medication Report

## 2018-04-24 NOTE — PROGRESS NOTES
Clinic Care Coordination Contact  [facility]/Voicemail    Referral Source: [facility] ([facility])  Clinical Data: Care Coordinator Outreach  Outreach attempted x 1.  Left message on voicemail with call back information and requested return call.  Plan: Care Coordinator will mail out care coordination introduction letter with care coordinator contact information and explanation of care coordination services. Care Coordinator will try to reach patient again in 1-2 business days.  Amish RODRIGUEZ,RN- BC  Clinic Care Coordinator  [facility]  Phone: [phone]

## 2018-04-24 NOTE — LETTER
[facility]-Care Coordination   [address] [place] [address]  [phone]        April 24, 2018      Sandra Dos Santos  [address] [address]    Dear Sandra,    I am a clinic care coordinator who works with Dr. Leslie at the [facility]. I recently tried to call and was unable to reach you. I wanted to introduce myself and provide you with my contact information so that you can call me with questions or concerns about your health care. Below is a description of clinic care coordination and how I can further assist you.     The clinic care coordinator is a registered nurse and/or  who understand the health care system. The goal of clinic care coordination is to help you manage your health and improve access to the [facility] system in the most efficient manner. The registered nurse can assist you in meeting your health care goals by providing education, coordinating services, and strengthening the communication among your providers. The  can assist you with financial, behavioral, psychosocial, chemical dependency, counseling, and/or psychiatric resources.    Please feel free to contact me at [phone], with any questions or concerns. We at [facility] are focused on providing you with the highest-quality healthcare experience possible and that all starts with you.     Sincerely,     Amish RODRIGUEZ,RN- BC  Clinic Care Coordinator  [facility]  Phone: [phone]    Enclosed: I have enclosed a copy of a 24 Hour Access Plan. This has helpful phone numbers for you to call when needed. Please keep this in an easy to access place to use as needed. and I have enclosed helpful educational material. Please review and call me with any questions.

## 2018-04-25 ENCOUNTER — THERAPY VISIT (OUTPATIENT)
Dept: PHYSICAL THERAPY | Facility: CLINIC | Age: 63
End: 2018-04-25
Payer: MEDICARE

## 2018-04-25 DIAGNOSIS — M54.50 LUMBAGO: Primary | ICD-10-CM

## 2018-04-25 PROCEDURE — 97161 PT EVAL LOW COMPLEX 20 MIN: CPT | Mod: GP | Performed by: PHYSICAL THERAPIST

## 2018-04-25 PROCEDURE — G8978 MOBILITY CURRENT STATUS: HCPCS | Mod: GP | Performed by: PHYSICAL THERAPIST

## 2018-04-25 PROCEDURE — 97110 THERAPEUTIC EXERCISES: CPT | Mod: GP | Performed by: PHYSICAL THERAPIST

## 2018-04-25 PROCEDURE — G8979 MOBILITY GOAL STATUS: HCPCS | Mod: GP | Performed by: PHYSICAL THERAPIST

## 2018-04-25 NOTE — MR AVS SNAPSHOT
After Visit Summary   4/25/2018    Sandra Dos Santos    MRN: [id number]           Patient Information     Date Of Birth          1955        Visit Information        Provider Department      4/25/2018 1:05 PM Tasha Keating, PT; MULTILINGUAL [facility]        Today's Diagnoses     Lumbago    -  1       Follow-ups after your visit        Your next 10 appointments already scheduled     Apr 27, 2018  2:10 PM CDT   [facility] Spine with Dwayne Parrish PTA   [facility] [place] [facility] ([facility] [place])    [address]  [address]  [place] [address]   [phone]            May 03, 2018 10:40 AM CDT   [facility] Spine with Dwayne Parrish PTA   [facility] [place] ([facility] [place])    [address]  [address]  [place] [address]   [phone]            May 07, 2018  3:25 PM CDT   [facility] Back in Balance/Medx with Bharath Duckworth PT   [facility] [place] ([facility] [place]  )    [address]  [address]  [facility] [address]   [phone]            May 10, 2018 11:20 AM CDT   [facility] Spine with Dwayne Parrish PTA   [facility] ([facility] [place])    [address]  [place] [address]   [phone]              Who to contact     If you have questions or need follow up information about today's clinic visit or your schedule please contact [facility] directly at [phone].  Normal or non-critical lab and imaging results will be communicated to you by MyChart, letter or phone within 4 business days after the clinic has received the results. If you do not hear from us within 7 days, please contact the clinic through MyChart or phone. If you have a critical or abnormal lab result, we will notify you by phone as soon as possible.  Submit refill requests through [employer] or call your pharmacy and they will forward the refill request to  "us. Please allow 3 business days for your refill to be completed.          Additional Information About Your Visit        MyChart Information     [employer]fusion lets you send messages to your doctor, view your test results, renew your prescriptions, schedule appointments and more. To sign up, go to [email or web address] . Click on \"Log in\" on the left side of the screen, which will take you to the Welcome page. Then click on \"Sign up Now\" on the right side of the page.     You will be asked to enter the access code listed below, as well as some personal information. Please follow the directions to create your username and password.     Your access code is: [email or web address]  Expires: 2018  3:45 PM     Your access code will  in 90 days. If you need help or a new code, please call your [facility] clinic or [phone].        Care EveryWhere ID     This is your Care EveryWhere ID. This could be used by other organizations to access your [facility] medical records  [id number]         Blood Pressure from Last 3 Encounters:   18 126/74   18 112/54   17 110/60    Weight from Last 3 Encounters:   18 116.1 kg (256 lb)   18 118.4 kg (261 lb)   17 111.6 kg (246 lb)              We Performed the Following     [facility] CERT REPORT     [facility] Inital Eval Report     PT Eval, Low Complexity (409[address])     Therapeutic Exercises        Primary Care Provider Office Phone # Fax #    Deborah Leslie [phone] [address] [address]        Equal Access to Services     [facility] : Hadii more Zhou, soo chacon, qaybaraseli orosco. So [facility] [phone].    ATENCIÓN: Si habla español, tiene a fletcher disposición servicios gratuitos de asistencia lingüística. Radha al [phone].    We comply with applicable federal civil rights laws and [place] laws. We do not discriminate on the basis of race, color, " national origin, age, disability, sex, sexual orientation, or gender identity.            Thank you!     Thank you for choosing [facility]  for your care. Our goal is always to provide you with excellent care. Hearing back from our patients is one way we can continue to improve our services. Please take a few minutes to complete the written survey that you may receive in the mail after your visit with us. Thank you!             Your Updated Medication List - Protect others around you: Learn how to safely use, store and throw away your medicines at www.disposemymeds.org.          This list is accurate as of 4/25/18  2:28 PM.  Always use your most recent med list.                   Brand Name Dispense Instructions for use Diagnosis    * acetaminophen 325 MG tablet    TYLENOL    100 tablet    Take 1-2 tablets (325-650 mg) by mouth every 6 hours as needed for mild pain    Chronic pain syndrome       * acetaminophen 325 MG tablet    TYLENOL     Take 650 mg by mouth        * albuterol (2.5 MG/3ML) 0.083% neb solution     25 vial    Take 1 vial (2.5 mg) by nebulization every 4 hours as needed for shortness of breath / dyspnea or wheezing    Intermittent asthma, uncomplicated       * albuterol 108 (90 Base) MCG/ACT Inhaler    PROAIR HFA/PROVENTIL HFA/VENTOLIN HFA    1 Inhaler    Inhale 2 puffs into the lungs every 4 hours as needed for shortness of breath / dyspnea or wheezing    Intermittent asthma, uncomplicated       ASPIRIN NOT PRESCRIBED    INTENTIONAL     Antiplatelet medication not prescribed intentionally due to possible aneurysm seen on CT        atorvastatin 10 MG tablet    LIPITOR    90 tablet    Take 1 tablet (10 mg) by mouth daily    Hyperlipidemia LDL goal <100       azithromycin 250 MG tablet    ZITHROMAX    6 tablet    Two tablets first day, then one tablet daily for four days.    Moderate asthma with exacerbation, unspecified whether persistent       B-12 1000 MCG Tbcr     30  tablet    TAKE 1 TABLETS BY MOUTH EVERY DAY    Pernicious anemia       cholecalciferol 1000 UNIT tablet    vitamin D3    100 tablet    Take 1 tablet (1,000 Units) by mouth daily    Preventative health care       diclofenac 1 % Gel topical gel    VOLTAREN          fluticasone 110 MCG/ACT Inhaler    FLOVENT HFA    1 Inhaler    Inhale 2 puffs into the lungs 2 times daily    Moderate asthma with exacerbation, unspecified whether persistent       glimepiride 4 MG tablet    AMARYL    60 tablet    TAKE 1 TABLET (4 MG) BY MOUTH 2 TIMES DAILY    Uncontrolled type 2 diabetes mellitus with stage 3 chronic kidney disease, with long-term current use of insulin (H)       GLOBAL EASE INJECT PEN NEEDLES 31G X 5 MM   Generic drug:  insulin pen needle     100 each    Use daily        insulin glargine U-300 300 UNIT/ML injection    TOUJEO SOLOSTAR    15 mL    Inject 18 Units Subcutaneous At Bedtime    Uncontrolled type 2 diabetes mellitus with stage 3 chronic kidney disease, with long-term current use of insulin (H)       INVOKANA 100 MG tablet   Generic drug:  canagliflozin     90 tablet    Take 1 tablet (100 mg) by mouth every morning (before breakfast)        lidocaine-prilocaine cream    EMLA    5800 g    Apply topically as needed for moderate pain        losartan 25 MG tablet    COZAAR    90 tablet    Take 1 tablet (25 mg) by mouth daily    Hypertension goal BP (blood pressure) < 140/90       order for DME     400 each    Lancets.  Four times daily and prn.    Type 2 diabetes mellitus with stage 3 chronic kidney disease, with long-term current use of insulin (H)       order for DME     400 each    Test strips for pt's glucometer, brand as covered by insurance. Test four times daily and prn.    Type 2 diabetes mellitus with stage 3 chronic kidney disease, with long-term current use of insulin (H)       oxyCODONE IR 5 MG tablet    ROXICODONE    14 tablet    Take 1 tablet (5 mg) by mouth every 6 hours as needed for severe pain     Chronic pain syndrome, DDD (degenerative disc disease), lumbar, Spinal stenosis of lumbar region, unspecified whether neurogenic claudication present       predniSONE 20 MG tablet    DELTASONE    20 tablet    Take 3 tabs (60 mg) by mouth daily x 3 days, 2 tabs (40 mg) daily x 3 days, 1 tab (20 mg) daily x 3 days, then 1/2 tab (10 mg) x 3 days.    Moderate asthma with exacerbation, unspecified whether persistent       ranitidine 150 MG tablet    ZANTAC    180 tablet    Take 1 tablet (150 mg) by mouth 2 times daily    Gastroesophageal reflux disease without esophagitis       * Notice:  This list has 4 medication(s) that are the same as other medications prescribed for you. Read the directions carefully, and ask your doctor or other care provider to review them with you.

## 2018-04-25 NOTE — LETTER
DEPARTMENT OF HEALTH AND HUMAN SERVICES  CENTERS FOR MEDICARE & MEDICAID SERVICES    PLAN/UPDATED PLAN OF PROGRESS FOR OUTPATIENT REHABILITATION    PATIENTS NAME:  Sandra Dos Santos   : 1955  PROVIDER NUMBER:    [id number]  [id number]  PROVIDER NAME: [facility]  MEDICAL RECORD NUMBER: [id number]   START OF CARE DATE:  SOC Date: 18   TYPE:  PT    PRIMARY/TREATMENT DIAGNOSIS: (Pertinent Medical Diagnosis)  Lumbago    VISITS FROM START OF CARE:  Rxs Used: 1     [facility] Initial Evaluation  Subjective:  Patient is a 63 year old male presenting with rehab back hpi. The history is provided by the patient. A  was used. Sandra Dos Santos is a 63 year old male with a lumbar condition.  Condition occurred with:  A fall/slip (chronic pain but exacerbation with slip).  Condition occurred: in the community.  This is a chronic condition  Date of MD order for this episode was 18. Sandra states his back has bothered him for 2-3 years. Last weekend he slipped and caught himself on his car. That night he was in significant pain and went to the ER the next day.  Yesterday he also went to the ER due to severe pain, he had an MRI yesterday. He will have an injection tomorrow.   Exercise-walks, stationary bike, [facility]  Patient reports pain:  Lumbar spine right and lumbar spine left.  Radiates to:  Gluteals left, thigh left and thigh right.  Pain is described as sharp and is constant and reported as 10/10.  Associated symptoms:  Loss of strength and loss of motion/stiffness. Pain is the same all the time.  Symptoms are exacerbated by standing, certain positions, bending, walking, lifting and carrying (transitions painful) and relieved by ice and heat.  Since onset symptoms are gradually worsening.  Special tests:  MRI (had yesterday, no results yet).   General health as reported by patient is poor.  Pertinent medical history includes:   Overweight, diabetes, high blood pressure, depression and asthma.  Medical allergies: yes (ASA).  Other surgeries include:  Orthopedic surgery (B TKR, R shoulder).  Current medications:  Pain medication, high blood pressure medication and other (for diabetes, stomach).  Current occupation is On disability d/t shoulder and knees, depression.    Primary job tasks include:  Prolonged sitting.  Barriers include:  None as reported by the patient.  Red flags:  None as reported by the patient.                Objective:  Standing Alignment:    General deviations alignment: stand with slight trunk flx.  Gait:  Son also assists, trunk flexed  Gait Type:  Antalgic   Assistive Devices:  Cane  Flexibility/Screens:   Lower Extremity:  Decreased left lower extremity flexibility:Hamstrings and Gastroc  Decreased right lower extremity flexibility:  Hamstrings and Gastroc    PATIENTS NAME:  Sandra Dos Santos   : 1955       Lumbar/SI Evaluation  ROM:    AROM Lumbar:   Flexion:            Unable  Ext:                    50%, repeated reduces pain   Side Bend:        Left:  50%    Right:  50%  Rotation:           Left:  100%    Right:  100%  Side Glide:        Left:     Right  Lumbar Myotomes:  Lumbar myotomes: unable to lift hip due to pain.  L2-4 (Quads):  Left:  5    Right:  5  L4 (Ankle DF):  Left:  5    Right:  5  Lumbar DTR's:  Lumbar dtr's: unable to elicit patellar or achilles B.  Lumbar Dermtomes:  normal  Neural Tension/Mobility:  Neural tension wnl lumbar: SLR caused R LBP, no radicular pain.    Left side:SLR  negative.   Right side:   SLR  negative.   Lumbar Palpation:    Tenderness present at Left:    Quadratus Lumborum; Erector Spinae; Piriformis and PSIS  Tenderness present at Right: Quadratus Lumborum; Erector Spinae; Piriformis and PSIS  Lumbar Provocation:  Lumbar provocation: hip motions painful in low back.  Spinal Segmental Conclusions: Unable to assess due to pain  SI joint/Sacrum:    Unable to assess due to  pain    Assessment/Plan:    Patient is a 63 year old male with lumbar complaints.    Patient has the following significant findings with corresponding treatment plan.                Diagnosis 1:  LBP, B thigh pain  Pain -  hot/cold therapy, self management, education and home program  Decreased ROM/flexibility - manual therapy, therapeutic exercise and home program  Decreased strength - therapeutic exercise, therapeutic activities and home program  Impaired balance - neuro re-education, therapeutic activities and home program  Decreased proprioception - neuro re-education, therapeutic activities and home program  Impaired gait - gait training and home program  Impaired muscle performance - neuro re-education and home program  Decreased function - therapeutic activities and home program  Impaired posture - neuro re-education and home program    Therapy Evaluation Codes:   1) History comprised of:   Personal factors that impact the plan of care:      Age, Past/current experiences and Time since onset of symptoms.    Comorbidity factors that impact the plan of care are:      Diabetes, Depression, High blood pressure and Overweight.     Medications impacting care: Anti-depressant and Pain.  2) Examination of Body Systems comprised of:   Body structures and functions that impact the plan of care:      Lumbar spine.   Activity limitations that impact the plan of care are:    PATIENTS NAME:  Sandra Dos Santos   : 1955      Bathing, Bending, Cooking, Driving, Dressing, Lifting, Sitting, Squatting/kneeling, Stairs, Standing, Walking, Working and Sleeping.  3) Clinical presentation characteristics are:   Stable/Uncomplicated.  4) Decision-Making    Low complexity using standardized patient assessment instrument and/or measureable assessment of functional outcome.  Cumulative Therapy Evaluation is: Low complexity.  Previous and current functional limitations:  (See Goal Flow Sheet for this information)    Short term and Long  "term goals: (See Goal Flow Sheet for this information)   Communication ability:  Patient appears to be able to clearly communicate and understand verbal and written communication and follow directions correctly.  Treatment Explanation - The following has been discussed with the patient:   RX ordered/plan of care  Anticipated outcomes  Possible risks and side effects  This patient would benefit from PT intervention to resume normal activities.   Rehab potential is good.  Frequency:  2 X week, once daily  Duration:  for 2 weeks tapering to 1 X a week over 2 weeks  Discharge Plan:  Achieve all LTG.  Independent in home treatment program.  Reach maximal therapeutic benefit.  Please refer to the daily flowsheet for treatment today, total treatment time and time spent performing 1:1 timed codes.         Caregiver Signature/Credentials _____________________________ Date ________      Treating Provider: Tasha Keating, PT   I have reviewed and certified the need for these services and plan of treatment while under my care.        PHYSICIAN'S SIGNATURE:   _________________________________________  Date___________   Stanislav Ortega M.D.    Certification period:  Beginning of Cert date period: 04/25/18 to  End of Cert period date: 07/23/18     Functional Level Progress Report: Please see attached \"Goal Flow sheet for Functional level.\"    ____X____ Continue Services or       ________ DC Services                Service dates: From  SOC Date: 04/25/18 date to present                         "

## 2018-04-25 NOTE — PROGRESS NOTES
[facility] Initial Evaluation  Subjective:  Patient is a 63 year old male presenting with rehab back hpi. The history is provided by the patient. A  was used.   Sandra Dos Santos is a 63 year old male with a lumbar condition.  Condition occurred with:  A fall/slip (chronic pain but exacerbation with slip).  Condition occurred: in the community.  This is a chronic condition  Date of MD order for this episode was 4-23-18. Sandra states his back has bothered him for 2-3 years. Last weekend he slipped and caught himself on his car. That night he was in significant pain and went to the ER the next day.  Yesterday he also went to the ER due to severe pain, he had an MRI yesterday. He will have an injection tomorrow.   Exercise-walks, stationary bike, [facility]    Patient reports pain:  Lumbar spine right and lumbar spine left.  Radiates to:  Gluteals left, thigh left and thigh right.  Pain is described as sharp and is constant and reported as 10/10.  Associated symptoms:  Loss of strength and loss of motion/stiffness. Pain is the same all the time.  Symptoms are exacerbated by standing, certain positions, bending, walking, lifting and carrying (transitions painful) and relieved by ice and heat.  Since onset symptoms are gradually worsening.  Special tests:  MRI (had yesterday, no results yet).      General health as reported by patient is poor.  Pertinent medical history includes:  Overweight, diabetes, high blood pressure, depression and asthma.  Medical allergies: yes (ASA).  Other surgeries include:  Orthopedic surgery (B TKR, R shoulder).  Current medications:  Pain medication, high blood pressure medication and other (for diabetes, stomach).  Current occupation is On disability d/t shoulder and knees, depression.    Primary job tasks include:  Prolonged sitting.    Barriers include:  None as reported by the patient.    Red flags:  None as reported by the patient.                         Objective:  Standing Alignment:                  General deviations alignment: stand with slight trunk flx.  Gait:  Son also assists, trunk flexed  Gait Type:  Antalgic   Assistive Devices:  Cane      Flexibility/Screens:       Lower Extremity:  Decreased left lower extremity flexibility:Hamstrings and Gastroc    Decreased right lower extremity flexibility:  Hamstrings and Gastroc               Lumbar/SI Evaluation  ROM:    AROM Lumbar:   Flexion:            Unable  Ext:                    50%, repeated reduces pain   Side Bend:        Left:  50%    Right:  50%  Rotation:           Left:  100%    Right:  100%  Side Glide:        Left:     Right:           Lumbar Myotomes:  Lumbar myotomes: unable to lift hip due to pain.    L2-4 (Quads):  Left:  5    Right:  5  L4 (Ankle DF):  Left:  5    Right:  5      Lumbar DTR's:  Lumbar dtr's: unable to elicit patellar or achilles B.        Lumbar Dermtomes:  normal                Neural Tension/Mobility:  Neural tension wnl lumbar: SLR caused R LBP, no radicular pain.      Left side:SLR  negative.     Right side:   SLR  negative.   Lumbar Palpation:    Tenderness present at Left:    Quadratus Lumborum; Erector Spinae; Piriformis and PSIS  Tenderness present at Right: Quadratus Lumborum; Erector Spinae; Piriformis and PSIS    Lumbar Provocation:  Lumbar provocation: hip motions painful in low back.      Spinal Segmental Conclusions: Unable to assess due to pain          SI joint/Sacrum:    Unable to assess due to pain                                                       General     ROS    Assessment/Plan:    Patient is a 63 year old male with lumbar complaints.    Patient has the following significant findings with corresponding treatment plan.                Diagnosis 1:  LBP, B thigh pain  Pain -  hot/cold therapy, self management, education and home program  Decreased ROM/flexibility - manual therapy, therapeutic exercise and home program  Decreased strength -  therapeutic exercise, therapeutic activities and home program  Impaired balance - neuro re-education, therapeutic activities and home program  Decreased proprioception - neuro re-education, therapeutic activities and home program  Impaired gait - gait training and home program  Impaired muscle performance - neuro re-education and home program  Decreased function - therapeutic activities and home program  Impaired posture - neuro re-education and home program    Therapy Evaluation Codes:   1) History comprised of:   Personal factors that impact the plan of care:      Age, Past/current experiences and Time since onset of symptoms.    Comorbidity factors that impact the plan of care are:      Diabetes, Depression, High blood pressure and Overweight.     Medications impacting care: Anti-depressant and Pain.  2) Examination of Body Systems comprised of:   Body structures and functions that impact the plan of care:      Lumbar spine.   Activity limitations that impact the plan of care are:      Bathing, Bending, Cooking, Driving, Dressing, Lifting, Sitting, Squatting/kneeling, Stairs, Standing, Walking, Working and Sleeping.  3) Clinical presentation characteristics are:   Stable/Uncomplicated.  4) Decision-Making    Low complexity using standardized patient assessment instrument and/or measureable assessment of functional outcome.  Cumulative Therapy Evaluation is: Low complexity.    Previous and current functional limitations:  (See Goal Flow Sheet for this information)    Short term and Long term goals: (See Goal Flow Sheet for this information)     Communication ability:  Patient appears to be able to clearly communicate and understand verbal and written communication and follow directions correctly.  Treatment Explanation - The following has been discussed with the patient:   RX ordered/plan of care  Anticipated outcomes  Possible risks and side effects  This patient would benefit from PT intervention to resume  normal activities.   Rehab potential is good.        Frequency:  2 X week, once daily  Duration:  for 2 weeks tapering to 1 X a week over 2 weeks  Discharge Plan:  Achieve all LTG.  Independent in home treatment program.  Reach maximal therapeutic benefit.    Please refer to the daily flowsheet for treatment today, total treatment time and time spent performing 1:1 timed codes.

## 2018-04-26 NOTE — PROGRESS NOTES
Clinic Care Coordination Contact  [facility]/Voicemail    Referral Source: [facility] ([facility])  Clinical Data: Care Coordinator Outreach  Outreach attempted x 2.  Left message on voicemail with call back information and requested return call.  Plan: Care Coordinator mailed out care coordination introduction letter on 4/24/18. Care Coordinator will try to reach patient again in 3-5 business days.  Amish RODRIGUEZ,RN- BC  Clinic Care Coordinator  [facility]  Phone: [phone]

## 2018-04-27 ENCOUNTER — THERAPY VISIT (OUTPATIENT)
Dept: PHYSICAL THERAPY | Facility: CLINIC | Age: 63
End: 2018-04-27
Payer: MEDICARE

## 2018-04-27 DIAGNOSIS — M54.50 LUMBAGO: ICD-10-CM

## 2018-04-27 PROCEDURE — 97112 NEUROMUSCULAR REEDUCATION: CPT | Mod: GP | Performed by: PHYSICAL THERAPY ASSISTANT

## 2018-04-27 PROCEDURE — 97110 THERAPEUTIC EXERCISES: CPT | Mod: GP | Performed by: PHYSICAL THERAPY ASSISTANT

## 2018-04-27 PROCEDURE — 97530 THERAPEUTIC ACTIVITIES: CPT | Mod: GP | Performed by: PHYSICAL THERAPY ASSISTANT

## 2018-05-01 NOTE — PROGRESS NOTES
Clinic Care Coordination Contact  OUTREACH  Referral Information:  Referral Source: [facility] ([facility] ED)  Primary Diagnosis: Orthopedic (LBP, does have diabetes)  Chief Complaint   Patient presents with     Clinic Care Coordination - Initial     Nelsy GRECO f/u RN CC   Universal Utilization  Utilization    Last refreshed: 5/1/2018  9:36 AM:  No Show Count (past year) 3       Last refreshed: 5/1/2018  9:36 AM:  ED visits 0       Last refreshed: 5/1/2018  9:36 AM:  Hospital admissions 0          Current as of: 5/1/2018  9:36 AM         Clinical Concerns:  Current Medical Concerns:Went to [facility] ED after falling as he was getting into his car. Has history of LBP but reports this is worse/different. Has since followed up with PCP and is undergoing PT.   Current Behavioral Concerns: None   Education Provided to patient: need for f/u regarding diabetes, role of RN CC   Health Maintenance Reviewed: Due/Overdue: (HIV Screen, AD Planning, Foot, Lipid, U Tox)    Medication Management:  Attempted to discuss diabetic meds. Patient speaks english but difficult to understand (he declined  for call). This RN CC noted that clinic staff outreached to him last week, indicating that he needs a diabetes follow up in order for Amaryl to be renewed. He told staff he did not want the medication. Attempted to discuss with him, he reports he saw a specialist in the last 2 months and does not use Amaryl or insulin anymore. Reports it should all be in the computer. This RN CC cannot see encounters in [facility] or in care everywhere. Patient did not want to discuss any further.    Functional Status:  Mobility Status: Independent  Equipment Currently Used at Home: none  Transportation means:: Accessible car, Regular car     Psychosocial:  Current living arrangement: I live in a private home  Financial/Insurance: No concerns today    Resources and Interventions:  Current Resources: Other (see comment) (OP PT  ordered)-he is attending  Advanced Care Plans/Directives on file: No  Patient/Caregiver understanding: Patient reports that he continues to have back pain but that he is doing everything he should. Reports he saw the doctor after ED visit and is having PT and getting injections as usual. He declined any further CC intervention at this time. After speaking with him, noted that clinic contacted him about diabetes f/u so that his Amaryl could be reordered. He told the clinic he did not want the medication> I called patient back to clarify. He reports he does not need this medication. That he saw a specialist a couple months ago and it should all me in the chart. Reports that the specialist was with [facility]. However no specialty notes in chart or in care everywhere. Reports he is no longer taking insulin either. Reports he does not need. Declined any CC intervention for diabetes. Dis not want me to get an  to better understand him.       Future Appointments              In 2 days Dwayne Parrish PTA [facility] Abdirashid PT, ROWENA [facility] BLP    In 6 days Bharath Duckworth, PT; MULTIL[facility] [employer] ROWENA QUINTANILLA PT, ROWENA CHAUDHARI    In 1 week Dwayne Parrish PTA [facility] Abdirashid PT, ROW[facility]           Plan: No further outreaches as patient declined CC intervention    Amish RODRIGUEZ,RN- BC  Clinic Care Coordinator  [facility]  Phone: [phone]

## 2018-05-03 ENCOUNTER — THERAPY VISIT (OUTPATIENT)
Dept: PHYSICAL THERAPY | Facility: CLINIC | Age: 63
End: 2018-05-03
Payer: MEDICARE

## 2018-05-03 DIAGNOSIS — M54.50 LUMBAGO: ICD-10-CM

## 2018-05-03 PROCEDURE — 97110 THERAPEUTIC EXERCISES: CPT | Mod: GP | Performed by: PHYSICAL THERAPY ASSISTANT

## 2018-05-03 PROCEDURE — 97112 NEUROMUSCULAR REEDUCATION: CPT | Mod: GP | Performed by: PHYSICAL THERAPY ASSISTANT

## 2018-05-07 ENCOUNTER — THERAPY VISIT (OUTPATIENT)
Dept: PHYSICAL THERAPY | Facility: CLINIC | Age: 63
End: 2018-05-07
Payer: MEDICARE

## 2018-05-07 DIAGNOSIS — M48.061 SPINAL STENOSIS OF LUMBAR REGION, UNSPECIFIED WHETHER NEUROGENIC CLAUDICATION PRESENT: Primary | ICD-10-CM

## 2018-05-07 PROCEDURE — 97110 THERAPEUTIC EXERCISES: CPT | Mod: GP | Performed by: PHYSICAL THERAPIST

## 2018-05-07 NOTE — PROGRESS NOTES
Subjective:  HPI                    Objective:  System    Physical Exam                       BIB Evaluation  MedX ROM in degrees (normal MedX lumbar ROM =  0-72 degrees):  12-36       Back Extensor % Strength Deviation from the Norm   Norms Analysis/%:     12 Degrees: -38%  24 Degrees: -37%  36 Degrees: -9%             Back Extensor Average % Strength Deviation from the Norm: -28%       Age/Gender Comparison:  Value is > 1 standard deviation above the norm                                     ROS    Assessment/Plan:    PROGRESS  REPORT    Progress reporting period is from 4-25-18 to 5-7-18.       SUBJECTIVE  Subjective changes noted by patient:  Pt notes decreased pain since injection. Pt still notes bilateral pain R>L. Pt reports pain radiating into left thigh to the knee. .       Current pain level is 4/10  .     Previous pain level was  10/10  .   Changes in function:  None  Adverse reaction to treatment or activity: None    OBJECTIVE  Changes noted in objective findings:  See above for MedX results        ASSESSMENT/PLAN  Updated problem list and treatment plan: Diagnosis 1:  Lumbar pain  Pain -  hot/cold therapy, self management, education and home program  Decreased ROM/flexibility - therapeutic exercise, therapeutic activity and home program  Decreased strength - therapeutic exercise, therapeutic activities, home program and MedX  STG/LTGs have been met or progress has been made towards goals:  Pt progressing slowly towards goals  Assessment of Progress: The patient's condition has potential to improve.  Self Management Plans:  Patient has been instructed in a home treatment program.  I have re-evaluated this patient and find that the nature, scope, duration and intensity of the therapy is appropriate for the medical condition of the patient.  Noor continues to require the following intervention to meet STG and LTG's:  PT    Recommendations:  This patient would benefit from continued therapy.     Frequency:   1 X week, once daily  Duration:  for 3 weeks        Please refer to the daily flowsheet for treatment today, total treatment time and time spent performing 1:1 timed codes.

## 2018-05-07 NOTE — LETTER
DEPARTMENT OF HEALTH AND HUMAN SERVICES  CENTERS FOR MEDICARE & MEDICAID SERVICES    PLAN/UPDATED PLAN OF PROGRESS FOR OUTPATIENT REHABILITATION    PATIENTS NAME:  Sandra Dos Santos   : 1955  PROVIDER NUMBER:    [id number]  [place]:  [id number]   PROVIDER NAME: ROWENA QUINTANILLA PT  MEDICAL RECORD NUMBER: [id number]   START OF CARE DATE: 18   TYPE:  PT  PRIMARY/TREATMENT DIAGNOSIS:Spinal stenosis of lumbar region, unspecified whether neurogenic claudication present  VISITS FROM START OF CARE:  Rxs Used: 1       PROGRESS  REPORT  Progress reporting period is from 18 to 18.       SUBJECTIVE  Subjective changes noted by patient:  Pt notes decreased pain since injection. Pt still notes bilateral pain R>L. Pt reports pain radiating into left thigh to the knee. .       Current pain level is 4/10  .     Previous pain level was  10/10  .   Changes in function:  None  Adverse reaction to treatment or activity: None    OBJECTIVE  Changes noted in objective findings:  See above for MedX results    BIB Evaluation  MedX ROM in degrees (normal MedX lumbar ROM =  0-72 degrees):  12-36       Back Extensor % Strength Deviation from the Norm   Norms Analysis/%:     12 Degrees: -38%  24 Degrees: -37%  36 Degrees: -9%    Back Extensor Average % Strength Deviation from the Norm: -28%       Age/Gender Comparison:  Value is > 1 standard deviation above the norm         ASSESSMENT/PLAN  Updated problem list and treatment plan: Diagnosis 1:  Lumbar pain  Pain -  hot/cold therapy, self management, education and home program  Decreased ROM/flexibility - therapeutic exercise, therapeutic activity and home program  Decreased strength - therapeutic exercise, therapeutic activities, home program and MedX  STG/LTGs have been met or progress has been made towards goals:  Pt progressing slowly towards goals  Assessment of Progress: The patient's condition has potential to improve.  Self Management Plans:  Patient has been instructed in  "a home treatment program.  I have re-evaluated this patient and find that the nature, scope, duration and intensity of the therapy is appropriate for the medical condition of the patient.  PATIENTS NAME:  Sandra Dos Santos   : 1955        Sandra continues to require the following intervention to meet STG and LTG's:  PT    Recommendations:  This patient would benefit from continued therapy.     Frequency:  1 X week, once daily  Duration:  for 3 weeks        Caregiver Signature/Credentials _____________________________ Date ________       Treating Provider: Bharath Duckworth, PT     I have reviewed and certified the need for these services and plan of treatment while under my care.        PHYSICIAN'S SIGNATURE:   _______________________________ Date___________            Stanislav Ortega MD    Certification period:  Beginning of Cert date period: 18 to  End of Cert period date: 18     Functional Level Progress Report: Please see attached \"Goal Flow sheet for Functional level.\"    ____X____ Continue Services or       ________ DC Services                Service dates: From  SOC Date: 18 date to present                         "

## 2018-05-16 ENCOUNTER — TRANSFERRED RECORDS (OUTPATIENT)
Dept: HEALTH INFORMATION MANAGEMENT | Facility: CLINIC | Age: 63
End: 2018-05-16

## 2018-05-23 ENCOUNTER — TRANSFERRED RECORDS (OUTPATIENT)
Dept: HEALTH INFORMATION MANAGEMENT | Facility: CLINIC | Age: 63
End: 2018-05-23

## 2018-05-31 ENCOUNTER — TELEPHONE (OUTPATIENT)
Dept: PHARMACY | Facility: CLINIC | Age: 63
End: 2018-05-31

## 2018-05-31 NOTE — TELEPHONE ENCOUNTER
MTM referral from: F/up outreach    MTM referral outreach attempt #1 on May 31, 2018 at 2:39 PM      Outcome: Patient is not interested at this time because he's working with endocrinology, will route to provider as an FYI.     Viri Yi, PharmD, [facility]  Medication Therapy Management Provider  Pager: [phone]    No

## 2018-06-04 ENCOUNTER — TRANSFERRED RECORDS (OUTPATIENT)
Dept: HEALTH INFORMATION MANAGEMENT | Facility: CLINIC | Age: 63
End: 2018-06-04

## 2018-06-04 LAB
CREAT SERPL-MCNC: 1.19 MG/DL (ref 0.7–1.3)
GFR SERPL CREATININE-BSD FRML MDRD: >60 ML/MIN
GLUCOSE SERPL-MCNC: 151 MG/DL (ref 74–106)
HBA1C MFR BLD: 8.7 % (ref 0–5.7)
POTASSIUM SERPL-SCNC: 4.4 MMOL/L (ref 3.5–5.1)

## 2018-07-16 ENCOUNTER — OFFICE VISIT (OUTPATIENT)
Dept: OPHTHALMOLOGY | Facility: CLINIC | Age: 63
End: 2018-07-16
Payer: MEDICARE

## 2018-07-16 DIAGNOSIS — Z79.4 TYPE 2 DIABETES MELLITUS WITH STAGE 3 CHRONIC KIDNEY DISEASE, WITH LONG-TERM CURRENT USE OF INSULIN (H): Primary | ICD-10-CM

## 2018-07-16 DIAGNOSIS — N18.30 TYPE 2 DIABETES MELLITUS WITH STAGE 3 CHRONIC KIDNEY DISEASE, WITH LONG-TERM CURRENT USE OF INSULIN (H): Primary | ICD-10-CM

## 2018-07-16 DIAGNOSIS — H01.02A SQUAMOUS BLEPHARITIS OF UPPER AND LOWER EYELIDS OF BOTH EYES: ICD-10-CM

## 2018-07-16 DIAGNOSIS — H01.02B SQUAMOUS BLEPHARITIS OF UPPER AND LOWER EYELIDS OF BOTH EYES: ICD-10-CM

## 2018-07-16 DIAGNOSIS — H52.4 PRESBYOPIA: ICD-10-CM

## 2018-07-16 DIAGNOSIS — E11.22 TYPE 2 DIABETES MELLITUS WITH STAGE 3 CHRONIC KIDNEY DISEASE, WITH LONG-TERM CURRENT USE OF INSULIN (H): Primary | ICD-10-CM

## 2018-07-16 DIAGNOSIS — H40.003 GLAUCOMA SUSPECT, BILATERAL: ICD-10-CM

## 2018-07-16 PROBLEM — H53.142: Status: ACTIVE | Noted: 2018-07-16

## 2018-07-16 PROCEDURE — 92014 COMPRE OPH EXAM EST PT 1/>: CPT | Performed by: STUDENT IN AN ORGANIZED HEALTH CARE EDUCATION/TRAINING PROGRAM

## 2018-07-16 PROCEDURE — 92015 DETERMINE REFRACTIVE STATE: CPT | Mod: GY | Performed by: STUDENT IN AN ORGANIZED HEALTH CARE EDUCATION/TRAINING PROGRAM

## 2018-07-16 ASSESSMENT — REFRACTION_WEARINGRX
OD_AXIS: 157
SPECS_TYPE: BIFOCAL
OS_ADD: +3.00
OS_AXIS: 178
OD_ADD: +3.00
OS_SPHERE: PLANO
OD_CYLINDER: +1.00
OD_SPHERE: -0.25
OS_CYLINDER: +1.25

## 2018-07-16 ASSESSMENT — CUP TO DISC RATIO
OS_RATIO: 0.4
OD_RATIO: 0.6

## 2018-07-16 ASSESSMENT — SLIT LAMP EXAM - LIDS
COMMENTS: 1+ BLEPHARITIS
COMMENTS: 1+ BLEPHARITIS

## 2018-07-16 ASSESSMENT — VISUAL ACUITY
METHOD: SNELLEN - LINEAR
OD_CC+: -1
OS_CC: 20/40
OS_CC+: -1
OD_CC: 20/40
CORRECTION_TYPE: GLASSES

## 2018-07-16 ASSESSMENT — REFRACTION_MANIFEST
OS_SPHERE: -0.50
OD_CYLINDER: +0.75
OD_AXIS: 150
OD_ADD: +2.75
OS_AXIS: 175
OS_ADD: +2.75
OD_SPHERE: -0.25
OS_CYLINDER: +1.00

## 2018-07-16 ASSESSMENT — CONF VISUAL FIELD
OD_NORMAL: 1
OS_INFERIOR_TEMPORAL_RESTRICTION: 3

## 2018-07-16 ASSESSMENT — TONOMETRY
IOP_METHOD: APPLANATION
OD_IOP_MMHG: 19
OS_IOP_MMHG: 17

## 2018-07-16 ASSESSMENT — EXTERNAL EXAM - RIGHT EYE: OD_EXAM: NORMAL

## 2018-07-16 ASSESSMENT — EXTERNAL EXAM - LEFT EYE: OS_EXAM: NORMAL

## 2018-07-16 NOTE — PATIENT INSTRUCTIONS
"Clean the lids with baby shampoo daily  Continue using Zaditor twice a day as needed for itchiness  Use artificial tears up to 4 times per day  (Refresh Plus, Systane Balance, or generic artificial tears are ok. Avoid \"get the red out\" drops).  Keep blood sugars and blood pressure under good control.    Blanca Beyer MD  [phone]    Diabetes weakens the blood vessels all over the body, including the eyes. Damage to the blood vessels in the eyes can cause swelling or bleeding into part of the eye (called the retina). This is called diabetic retinopathy ([facility]). If not treated, this disease can cause vision loss or blindness.   Symptoms may include blurred or distorted vision, but many people have no symptoms. It's important to see your eye doctor regularly to check for problems.   Early treatment and good control can help protect your vision. Here are the things you can do to help prevent vision loss:      1. Keep your blood sugar levels under tight control.      2. Bring high blood pressure under control.      3. No smoking.      4. Have yearly dilated eye exams.    "

## 2018-07-16 NOTE — PROGRESS NOTES
" Current Eye Medications: OTC allergy drops that you suggested last visit. Two times a day both eyes.     Subjective:  Here for complete today. DM last a1c was 7.9 on 11-1-17.  Left eye has been giving him trouble, has been very itchy and watery.  Very hard to drive at night, lights are very difficult and causes blurred vision for him.    Lab Results   Component Value Date    A1C 7.9 11/01/2017    A1C 8.6 09/12/2017    A1C 8.4 08/01/2017    A1C 9.0 05/16/2017    A1C 8.7 03/08/2017      Objective:  See Ophthalmology Exam.      Assessment:  Sandra Dos Santos is a 63 year old male who presents with:   Encounter Diagnoses   Name Primary?     Type 2 diabetes mellitus with stage 3 chronic kidney disease, with long-term current use of insulin (H) Negative diabetic retinopathy      Glaucoma suspect, bilateral Again recommend returning for glaucoma tests.     Squamous blepharitis of upper and lower eyelids of both eyes      Plan:  Clean the lids with baby shampoo daily  Continue using Zaditor twice a day as needed for itchiness  Use artificial tears up to 4 times per day  (Refresh Plus, Systane Balance, or generic artificial tears are ok. Avoid \"get the red out\" drops).  Keep blood sugars and blood pressure under good control.    Blanca Beyer MD  [phone]    "

## 2018-07-16 NOTE — MR AVS SNAPSHOT
"              After Visit Summary   7/16/2018    Sandra Dos Santos    MRN: [id number]           Patient Information     Date Of Birth          1955        Visit Information        Provider Department      7/16/2018 2:15 PM Blanca Beyer MD; PHONE,  [facility]        Today's Diagnoses     Type 2 diabetes mellitus with stage 3 chronic kidney disease, with long-term current use of insulin (H)    -  1    Glaucoma suspect, bilateral        Squamous blepharitis of upper and lower eyelids of both eyes        Presbyopia        Visual discomfort of left eye          Care Instructions    Clean the lids with baby shampoo daily  Continue using Zaditor twice a day as needed for itchiness  Use artificial tears up to 4 times per day  (Refresh Plus, Systane Balance, or generic artificial tears are ok. Avoid \"get the red out\" drops).  Keep blood sugars and blood pressure under good control.    Blanca Beyer MD  [phone]    Diabetes weakens the blood vessels all over the body, including the eyes. Damage to the blood vessels in the eyes can cause swelling or bleeding into part of the eye (called the retina). This is called diabetic retinopathy ([facility]thee). If not treated, this disease can cause vision loss or blindness.   Symptoms may include blurred or distorted vision, but many people have no symptoms. It's important to see your eye doctor regularly to check for problems.   Early treatment and good control can help protect your vision. Here are the things you can do to help prevent vision loss:      1. Keep your blood sugar levels under tight control.      2. Bring high blood pressure under control.      3. No smoking.      4. Have yearly dilated eye exams.            Follow-ups after your visit        Follow-up notes from your care team     Return in about 4 weeks (around 8/13/2018) for IOP check, HVF, OCT optic nerve, Pachymetry.      Who to contact     If you have questions or " "need follow up information about today's clinic visit or your schedule please contact [facility] directly at [phone].  Normal or non-critical lab and imaging results will be communicated to you by MyChart, letter or phone within 4 business days after the clinic has received the results. If you do not hear from us within 7 days, please contact the clinic through [employer] or phone. If you have a critical or abnormal lab result, we will notify you by phone as soon as possible.  Submit refill requests through [employer] or call your pharmacy and they will forward the refill request to us. Please allow 3 business days for your refill to be completed.          Additional Information About Your Visit        [employer] Information     [employer] lets you send messages to your doctor, view your test results, renew your prescriptions, schedule appointments and more. To sign up, go to [email or web address] . Click on \"Log in\" on the left side of the screen, which will take you to the Welcome page. Then click on \"Sign up Now\" on the right side of the page.     You will be asked to enter the access code listed below, as well as some personal information. Please follow the directions to create your username and password.     Your access code is: [email or web address]  Expires: 2018  3:45 PM     Your access code will  in 90 days. If you need help or a new code, please call your [place] clinic or [phone].        Care EveryWhere ID     This is your Care EveryWhere ID. This could be used by other organizations to access your [place] medical records  [id number]         Blood Pressure from Last 3 Encounters:   18 126/74   18 112/54   17 110/60    Weight from Last 3 Encounters:   18 116.1 kg (256 lb)   18 118.4 kg (261 lb)   17 111.6 kg (246 lb)              Today, you had the following     No orders found for display       Primary Care Provider Office Phone # Fax #    Deborah " MD Anuj [phone]       [address]  [place] [address]        Equal Access to Services     ELISEO RO : Hadii aad ku hadsiddharthamya Zhou, abdonvicenta kinseybonnieha, apoloniariky wolffernanda meeks, araseli grullon norrisblayne bauer laruizadelina fuchs. So [facility] [phone].    ATENCIÓN: Si habla español, tiene a fletcher disposición servicios gratuitos de asistencia lingüística. Llame al [phone].    We comply with applicable federal civil rights laws and [place] laws. We do not discriminate on the basis of race, color, national origin, age, disability, sex, sexual orientation, or gender identity.            Thank you!     Thank you for choosing [facility]  for your care. Our goal is always to provide you with excellent care. Hearing back from our patients is one way we can continue to improve our services. Please take a few minutes to complete the written survey that you may receive in the mail after your visit with us. Thank you!             Your Updated Medication List - Protect others around you: Learn how to safely use, store and throw away your medicines at www.disposemymeds.org.          This list is accurate as of 7/16/18  3:56 PM.  Always use your most recent med list.                   Brand Name Dispense Instructions for use Diagnosis    * acetaminophen 325 MG tablet    TYLENOL    100 tablet    Take 1-2 tablets (325-650 mg) by mouth every 6 hours as needed for mild pain    Chronic pain syndrome       * acetaminophen 325 MG tablet    TYLENOL     Take 650 mg by mouth        * albuterol (2.5 MG/3ML) 0.083% neb solution     25 vial    Take 1 vial (2.5 mg) by nebulization every 4 hours as needed for shortness of breath / dyspnea or wheezing    Intermittent asthma, uncomplicated       * albuterol 108 (90 Base) MCG/ACT Inhaler    PROAIR HFA/PROVENTIL HFA/VENTOLIN HFA    1 Inhaler    Inhale 2 puffs into the lungs every 4 hours as needed for shortness of breath / dyspnea or wheezing    Intermittent  asthma, uncomplicated       ASPIRIN NOT PRESCRIBED    INTENTIONAL     Antiplatelet medication not prescribed intentionally due to possible aneurysm seen on CT        atorvastatin 10 MG tablet    LIPITOR    90 tablet    Take 1 tablet (10 mg) by mouth daily    Hyperlipidemia LDL goal <100       azithromycin 250 MG tablet    ZITHROMAX    6 tablet    Two tablets first day, then one tablet daily for four days.    Moderate asthma with exacerbation, unspecified whether persistent       B-12 1000 MCG Tbcr     30 tablet    TAKE 1 TABLETS BY MOUTH EVERY DAY    Pernicious anemia       cholecalciferol 1000 UNIT tablet    vitamin D3    100 tablet    Take 1 tablet (1,000 Units) by mouth daily    Preventative health care       diclofenac 1 % Gel topical gel    VOLTAREN          FLOVENT  MCG/ACT Inhaler   Generic drug:  fluticasone     12 g    INHALE 2 PUFFS INTO THE LUNGS 2 TIMES DAILY    Moderate asthma with exacerbation, unspecified whether persistent       glimepiride 4 MG tablet    AMARYL    60 tablet    Take 1 tablet (4 mg) by mouth 2 times daily Needs MD visit    Uncontrolled type 2 diabetes mellitus with stage 3 chronic kidney disease, with long-term current use of insulin (H)       GLOBAL EASE INJECT PEN NEEDLES 31G X 5 MM   Generic drug:  insulin pen needle     100 each    Use daily        insulin glargine U-300 300 UNIT/ML injection    TOUJEO SOLOSTAR    15 mL    Inject 18 Units Subcutaneous At Bedtime    Uncontrolled type 2 diabetes mellitus with stage 3 chronic kidney disease, with long-term current use of insulin (H)       INVOKANA 100 MG tablet   Generic drug:  canagliflozin     90 tablet    Take 1 tablet (100 mg) by mouth every morning (before breakfast)        lidocaine-prilocaine cream    EMLA    5800 g    Apply topically as needed for moderate pain        losartan 25 MG tablet    COZAAR    30 tablet    TAKE ONE TABLET BY MOUTH EVERY DAY    Urinary hesitancy       order for DME     400 each    Lancets.   Four times daily and prn.    Type 2 diabetes mellitus with stage 3 chronic kidney disease, with long-term current use of insulin (H)       order for DME     400 each    Test strips for pt's glucometer, brand as covered by insurance. Test four times daily and prn.    Type 2 diabetes mellitus with stage 3 chronic kidney disease, with long-term current use of insulin (H)       oxyCODONE IR 5 MG tablet    ROXICODONE    14 tablet    Take 1 tablet (5 mg) by mouth every 6 hours as needed for severe pain    Chronic pain syndrome, DDD (degenerative disc disease), lumbar, Spinal stenosis of lumbar region, unspecified whether neurogenic claudication present       predniSONE 20 MG tablet    DELTASONE    20 tablet    Take 3 tabs (60 mg) by mouth daily x 3 days, 2 tabs (40 mg) daily x 3 days, 1 tab (20 mg) daily x 3 days, then 1/2 tab (10 mg) x 3 days.    Moderate asthma with exacerbation, unspecified whether persistent       ranitidine 150 MG tablet    ZANTAC    180 tablet    Take 1 tablet (150 mg) by mouth 2 times daily    Gastroesophageal reflux disease without esophagitis       tamsulosin 0.4 MG capsule    FLOMAX    30 capsule    TAKE ONE CAPSULE BY MOUTH EVERY DAY    Hypertension goal BP (blood pressure) < 140/90       * Notice:  This list has 4 medication(s) that are the same as other medications prescribed for you. Read the directions carefully, and ask your doctor or other care provider to review them with you.

## 2018-07-16 NOTE — LETTER
"    7/16/2018         RE: Sandra Dos Santos  [address] [address]        Dear Colleague,    Thank you for referring your patient, Sandra Dos Santos, to the [facility].    No signs of diabetic retinopathy in either eye today.   Please see a copy of my visit note below.     Current Eye Medications: OTC allergy drops that you suggested last visit. Two times a day both eyes.     Subjective:  Here for complete today. DM last a1c was 7.9 on 11-1-17.  Left eye has been giving him trouble, has been very itchy and watery.  Very hard to drive at night, lights are very difficult and causes blurred vision for him.    Lab Results   Component Value Date    A1C 7.9 11/01/2017    A1C 8.6 09/12/2017    A1C 8.4 08/01/2017    A1C 9.0 05/16/2017    A1C 8.7 03/08/2017      Objective:  See Ophthalmology Exam.      Assessment:  Sandra Dos Santos is a 63 year old male who presents with:   Encounter Diagnoses   Name Primary?     Type 2 diabetes mellitus with stage 3 chronic kidney disease, with long-term current use of insulin (H) Negative diabetic retinopathy      Glaucoma suspect, bilateral Again recommend returning for glaucoma tests.     Squamous blepharitis of upper and lower eyelids of both eyes      Plan:  Clean the lids with baby shampoo daily  Continue using Zaditor twice a day as needed for itchiness  Use artificial tears up to 4 times per day  (Refresh Plus, Systane Balance, or generic artificial tears are ok. Avoid \"get the red out\" drops).  Keep blood sugars and blood pressure under good control.    Blanca Beyer MD  [phone]      Again, thank you for allowing me to participate in the care of your patient.        Sincerely,        Blanca Beyer MD    "

## 2018-07-18 PROBLEM — H53.142: Status: RESOLVED | Noted: 2018-07-16 | Resolved: 2018-07-18

## 2018-08-01 NOTE — PROGRESS NOTES
"  SUBJECTIVE:   Sandra Dos Santos is a 63 year old male who presents to clinic today for the following health issues:  {Provider please address medication reconciliation discrepancies--rooming staff please delete if no med/rec issues}    Diabetes Follow-up    Patient is checking blood sugars: once daily.  Results are as follows:         am - 90's-110    Diabetic concerns: None     Symptoms of hypoglycemia (low blood sugar): none     Paresthesias (numbness or burning in feet) or sores:yes in the heels     Date of last diabetic eye exam: 07/18/18    Diabetes Management Resources    Hyperlipidemia Follow-Up      Rate your low fat/cholesterol diet?: good    Taking statin?  Yes, no muscle aches from statin    Other lipid medications/supplements?:  none    Hypertension Follow-up      Outpatient blood pressures are not being checked.    Low Salt Diet: no added salt    BP Readings from Last 2 Encounters:   04/23/18 126/74   02/21/18 112/54     Hemoglobin A1C (%)   Date Value   11/01/2017 7.9 (H)   09/12/2017 8.6 (H)     LDL Cholesterol Calculated (mg/dL)   Date Value   01/04/2017 73   11/29/2016 122 (H)     Asthma Follow-Up    Was ACT completed today?    Yes    ACT Total Scores 2/21/2018   ACT TOTAL SCORE (Goal Greater than or Equal to 20) 12   In the past 12 months, how many times did you visit the emergency room for your asthma without being admitted to the hospital? 0   In the past 12 months, how many times were you hospitalized overnight because of your asthma? 0       Recent asthma triggers that patient is dealing with: None        Amount of exercise or physical activity: 6-7 days/week for an average of 30-45 minutes    Problems taking medications regularly: No    Medication side effects: none    Diet: low salt, low fat/cholesterol and diabetic        {additional problems for provider to add:012296}    Problem list and histories reviewed & adjusted, as indicated.  Additional history: {NONE - AS DOCUMENTED:616753::\"as " "documented\"}    {HIST REVIEW/ LINKS 2:659016}    Reviewed and updated as needed this visit by clinical staff       Reviewed and updated as needed this visit by Provider         {PROVIDER CHARTING PREFERENCE:745434}    "

## 2018-08-02 ENCOUNTER — OFFICE VISIT (OUTPATIENT)
Dept: FAMILY MEDICINE | Facility: CLINIC | Age: 63
End: 2018-08-02
Payer: MEDICARE

## 2018-08-02 VITALS
HEART RATE: 82 BPM | OXYGEN SATURATION: 95 % | BODY MASS INDEX: 35.99 KG/M2 | RESPIRATION RATE: 16 BRPM | WEIGHT: 251.4 LBS | SYSTOLIC BLOOD PRESSURE: 132 MMHG | DIASTOLIC BLOOD PRESSURE: 60 MMHG | TEMPERATURE: 98.6 F

## 2018-08-02 DIAGNOSIS — R39.11 URINARY HESITANCY: ICD-10-CM

## 2018-08-02 DIAGNOSIS — E78.5 HYPERLIPIDEMIA LDL GOAL <100: ICD-10-CM

## 2018-08-02 DIAGNOSIS — I10 HYPERTENSION GOAL BP (BLOOD PRESSURE) < 140/90: ICD-10-CM

## 2018-08-02 DIAGNOSIS — Z12.5 SCREENING FOR PROSTATE CANCER: ICD-10-CM

## 2018-08-02 DIAGNOSIS — F41.9 ANXIETY: ICD-10-CM

## 2018-08-02 DIAGNOSIS — N18.30 TYPE 2 DIABETES MELLITUS WITH STAGE 3 CHRONIC KIDNEY DISEASE, WITH LONG-TERM CURRENT USE OF INSULIN (H): Primary | ICD-10-CM

## 2018-08-02 DIAGNOSIS — G89.4 CHRONIC PAIN SYNDROME: ICD-10-CM

## 2018-08-02 DIAGNOSIS — E11.22 TYPE 2 DIABETES MELLITUS WITH STAGE 3 CHRONIC KIDNEY DISEASE, WITH LONG-TERM CURRENT USE OF INSULIN (H): Primary | ICD-10-CM

## 2018-08-02 DIAGNOSIS — N18.30 CHRONIC KIDNEY DISEASE, STAGE III (MODERATE) (H): ICD-10-CM

## 2018-08-02 DIAGNOSIS — Z79.4 TYPE 2 DIABETES MELLITUS WITH STAGE 3 CHRONIC KIDNEY DISEASE, WITH LONG-TERM CURRENT USE OF INSULIN (H): Primary | ICD-10-CM

## 2018-08-02 DIAGNOSIS — J45.30 MILD PERSISTENT ASTHMA WITHOUT COMPLICATION: ICD-10-CM

## 2018-08-02 PROCEDURE — 99214 OFFICE O/P EST MOD 30 MIN: CPT | Performed by: FAMILY MEDICINE

## 2018-08-02 RX ORDER — LANOLIN ALCOHOL/MO/W.PET/CERES
100 CREAM (GRAM) TOPICAL DAILY
COMMUNITY

## 2018-08-02 RX ORDER — DULOXETIN HYDROCHLORIDE 30 MG/1
30 CAPSULE, DELAYED RELEASE ORAL 2 TIMES DAILY
Qty: 60 CAPSULE | Refills: 1 | Status: SHIPPED | OUTPATIENT
Start: 2018-08-02 | End: 2018-10-02

## 2018-08-02 RX ORDER — ATORVASTATIN CALCIUM 10 MG/1
10 TABLET, FILM COATED ORAL DAILY
COMMUNITY
End: 2018-08-15

## 2018-08-02 RX ORDER — ACETAMINOPHEN 325 MG/1
325-650 TABLET ORAL EVERY 6 HOURS PRN
Qty: 100 TABLET | Refills: 1 | Status: CANCELLED | OUTPATIENT
Start: 2018-08-02

## 2018-08-02 RX ORDER — ATORVASTATIN CALCIUM 10 MG/1
10 TABLET, FILM COATED ORAL DAILY
Qty: 90 TABLET | Refills: 2 | Status: CANCELLED | OUTPATIENT
Start: 2018-08-02

## 2018-08-02 RX ORDER — GLIMEPIRIDE 4 MG/1
4 TABLET ORAL 2 TIMES DAILY
Qty: 60 TABLET | Refills: 0 | Status: CANCELLED | OUTPATIENT
Start: 2018-08-02

## 2018-08-02 RX ORDER — TAMSULOSIN HYDROCHLORIDE 0.4 MG/1
0.4 CAPSULE ORAL DAILY
Qty: 90 CAPSULE | Refills: 3 | Status: SHIPPED | OUTPATIENT
Start: 2018-08-02 | End: 2019-06-14

## 2018-08-02 RX ORDER — LOSARTAN POTASSIUM 25 MG/1
25 TABLET ORAL DAILY
Qty: 90 TABLET | Refills: 3 | Status: SHIPPED | OUTPATIENT
Start: 2018-08-02 | End: 2019-06-14

## 2018-08-02 ASSESSMENT — ANXIETY QUESTIONNAIRES
3. WORRYING TOO MUCH ABOUT DIFFERENT THINGS: SEVERAL DAYS
1. FEELING NERVOUS, ANXIOUS, OR ON EDGE: SEVERAL DAYS
7. FEELING AFRAID AS IF SOMETHING AWFUL MIGHT HAPPEN: NOT AT ALL
IF YOU CHECKED OFF ANY PROBLEMS ON THIS QUESTIONNAIRE, HOW DIFFICULT HAVE THESE PROBLEMS MADE IT FOR YOU TO DO YOUR WORK, TAKE CARE OF THINGS AT HOME, OR GET ALONG WITH OTHER PEOPLE: SOMEWHAT DIFFICULT
6. BECOMING EASILY ANNOYED OR IRRITABLE: SEVERAL DAYS
2. NOT BEING ABLE TO STOP OR CONTROL WORRYING: NEARLY EVERY DAY
5. BEING SO RESTLESS THAT IT IS HARD TO SIT STILL: NOT AT ALL
GAD7 TOTAL SCORE: 7

## 2018-08-02 ASSESSMENT — PAIN SCALES - GENERAL: PAINLEVEL: NO PAIN (0)

## 2018-08-02 ASSESSMENT — PATIENT HEALTH QUESTIONNAIRE - PHQ9: 5. POOR APPETITE OR OVEREATING: SEVERAL DAYS

## 2018-08-02 NOTE — PROGRESS NOTES
SUBJECTIVE:   Sandra Dos Santos is a 63 year old male who presents to clinic today for the following health issues:      Diabetes Follow-up  Pt sees Endocrine at [facility] -says his accuchecks are  and he is doing well  Diabetes Management Resources    Hyperlipidemia Follow-Up      Rate your low fat/cholesterol diet?: good    Taking statin?  Yes, no muscle aches from statin    Other lipid medications/supplements?:  none    Hypertension Follow-up      Outpatient blood pressures are not being checked.    Low Salt Diet: no added salt    BP Readings from Last 2 Encounters:   08/02/18 132/60   04/23/18 126/74     Hemoglobin A1C (%)   Date Value   11/01/2017 7.9 (H)   09/12/2017 8.6 (H)     LDL Cholesterol Calculated (mg/dL)   Date Value   01/04/2017 73   11/29/2016 122 (H)           Asthma Follow-Up    Was ACT completed today?    Yes    ACT Total Scores 8/2/2018   ACT TOTAL SCORE (Goal Greater than or Equal to 20) 22   In the past 12 months, how many times did you visit the emergency room for your asthma without being admitted to the hospital? 0   In the past 12 months, how many times were you hospitalized overnight because of your asthma? 0       Recent asthma triggers that patient is dealing with: None      Chronic Kidney Disease Follow-up      Current NSAID use?  No      Problem list and histories reviewed & adjusted, as indicated.  Additional history: as documented    Patient Active Problem List   Diagnosis     HYPERLIPIDEMIA LDL GOAL <100     Chronic kidney disease, stage III (moderate)     Gastroesophageal reflux disease     Hypothyroidism     Urinary hesitancy     Type 2 diabetes mellitus with stage 3 chronic kidney disease, with long-term current use of insulin (H)     Chronic pain syndrome     Morbid obesity due to excess calories (H)     Intermittent asthma, uncomplicated     Benign prostatic hyperplasia, presence of lower urinary tract symptoms unspecified, unspecified morphology      Gastroesophageal reflux disease without esophagitis     Hypertension goal BP (blood pressure) < 140/90     Lumbar stenosis     DDD (degenerative disc disease), lumbar     Osteoarthritis of lumbar spine, unspecified spinal osteoarthritis complication status     Facet arthropathy     Osteoarthritis of spine with radiculopathy, lumbosacral region     Other spondylosis, lumbosacral region     Aneurysm of internal carotid artery     Uncontrolled type 2 diabetes mellitus with stage 3 chronic kidney disease, with long-term current use of insulin (H)     Anxiety     Lumbago     Past Surgical History:   Procedure Laterality Date     ARTHROSCOPY SHOULDER RT/LT Right      JOINT REPLACEMTN, KNEE RT/LT Bilateral 2013  ,2012    Joint Replacement knee RT/LT       Social History   Substance Use Topics     Smoking status: Never Smoker     Smokeless tobacco: Never Used     Alcohol use No     Family History   Problem Relation Age of Onset     Diabetes Father      Prostate Cancer No family hx of      Colon Cancer No family hx of      Breast Cancer No family hx of          Current Outpatient Prescriptions   Medication Sig Dispense Refill     acetaminophen (TYLENOL) 325 MG tablet Take 1-2 tablets (325-650 mg) by mouth every 6 hours as needed for mild pain 100 tablet 1     albuterol (2.5 MG/3ML) 0.083% neb solution Take 1 vial (2.5 mg) by nebulization every 4 hours as needed for shortness of breath / dyspnea or wheezing 25 vial 1     albuterol (PROAIR HFA/PROVENTIL HFA/VENTOLIN HFA) 108 (90 BASE) MCG/ACT Inhaler Inhale 2 puffs into the lungs every 4 hours as needed for shortness of breath / dyspnea or wheezing 1 Inhaler 11     ASPIRIN NOT PRESCRIBED (INTENTIONAL) Antiplatelet medication not prescribed intentionally due to possible aneurysm seen on CT       atorvastatin (LIPITOR) 10 MG tablet Take 10 mg by mouth daily       Canagliflozin-Metformin HCl ER (INVOKAMET XR) 150-1000 MG TB24 Take by mouth 2 times daily       cholecalciferol  (VITAMIN D) 1000 UNIT tablet Take 1 tablet (1,000 Units) by mouth daily 100 tablet 3     Cyanocobalamin (B-12) 1000 MCG TBCR TAKE 1 TABLETS BY MOUTH EVERY DAY 30 tablet 8     DULoxetine (CYMBALTA) 30 MG EC capsule Take 1 capsule (30 mg) by mouth 2 times daily 60 capsule 1     FLOVENT  MCG/ACT Inhaler INHALE 2 PUFFS INTO THE LUNGS 2 TIMES DAILY 12 g 3     losartan (COZAAR) 25 MG tablet Take 1 tablet (25 mg) by mouth daily 90 tablet 3     order for DME Test strips for pt's glucometer, brand as covered by insurance. Test four times daily and prn. 400 each 4     order for DME Lancets.  Four times daily and prn. 400 each 4     Pregabalin (LYRICA PO) Take 150 mg by mouth 2 times daily       ranitidine (ZANTAC) 150 MG tablet Take 1 tablet (150 mg) by mouth 2 times daily 180 tablet 3     tamsulosin (FLOMAX) 0.4 MG capsule Take 1 capsule (0.4 mg) by mouth daily 90 capsule 3     thiamine (VITAMIN B-1) 100 MG tablet Take 100 mg by mouth daily       acetaminophen (TYLENOL) 325 MG tablet Take 650 mg by mouth       diclofenac (VOLTAREN) 1 % GEL topical gel        glimepiride (AMARYL) 4 MG tablet Take 1 tablet (4 mg) by mouth 2 times daily Needs MD visit (Patient not taking: Reported on 8/2/2018) 60 tablet 0     GLOBAL EASE INJECT PEN NEEDLES 31G X 5 MM Use daily (Patient not taking: Reported on 8/2/2018) 100 each 3     insulin glargine U-300 (TOUJEO SOLOSTAR) 300 UNIT/ML injection Inject 18 Units Subcutaneous At Bedtime (Patient not taking: Reported on 8/2/2018) 15 mL 0     INVOKANA 100 MG tablet Take 1 tablet (100 mg) by mouth every morning (before breakfast) (Patient not taking: Reported on 8/2/2018) 90 tablet 0     lidocaine-prilocaine (EMLA) cream Apply topically as needed for moderate pain (Patient not taking: Reported on 8/2/2018) 5800 g 1     oxyCODONE IR (ROXICODONE) 5 MG tablet Take 1 tablet (5 mg) by mouth every 6 hours as needed for severe pain (Patient not taking: Reported on 8/2/2018) 14 tablet 0      predniSONE (DELTASONE) 20 MG tablet Take 3 tabs (60 mg) by mouth daily x 3 days, 2 tabs (40 mg) daily x 3 days, 1 tab (20 mg) daily x 3 days, then 1/2 tab (10 mg) x 3 days. (Patient not taking: Reported on 8/2/2018) 20 tablet 0     [DISCONTINUED] losartan (COZAAR) 25 MG tablet TAKE ONE TABLET BY MOUTH EVERY DAY 30 tablet PRN     Allergies   Allergen Reactions     Asa [Aspirin] Other (See Comments)     dizzy     Metformin GI Disturbance     Morphine Itching     Itching     Recent Labs   Lab Test  11/01/17   1455  09/12/17   1423  08/01/17   1341   01/04/17   0757  11/29/16   1101  10/15/12   1108   09/15/10   1232   A1C  7.9*  8.6*  8.4*   < >  7.8*  8.1*   --    --   7.3*   LDL   --    --    --    --   73  122*   --    --   77   HDL   --    --    --    --   50  45   --    --   51   TRIG   --    --    --    --   197*  226*   --    --   167*   ALT   --    --    --    --    --   26  18   --    --    CR  1.21   --    --    --    --   1.22  0.80   < >   --    GFRESTIMATED  61   --    --    --    --   60*  >90   < >   --    GFRESTBLACK  73   --    --    --    --   73  >90   < >   --    POTASSIUM   --    --    --    --    --   4.6  4.2   --    --    TSH   --    --    --    --    --   2.58  1.16   --    --     < > = values in this interval not displayed.      BP Readings from Last 3 Encounters:   08/02/18 132/60   04/23/18 126/74   02/21/18 112/54    Wt Readings from Last 3 Encounters:   08/02/18 251 lb 6.4 oz (114 kg)   04/23/18 256 lb (116.1 kg)   02/21/18 261 lb (118.4 kg)                  Labs reviewed in EPIC    Reviewed and updated as needed this visit by clinical staff  Tobacco  Allergies       Reviewed and updated as needed this visit by Provider     Patient has problems with anxiety.  He gets very pavon and irritable at times.  He says the Zoloft did not help, and so he stopped taking it.  He would like to try something else.  He has problems with chronic pain in his lower back.  He has never been on Cymbalta.   "Denies any symptoms of depression.  Denies any suicidal ideation or thoughts.  He goes to the pain clinic for chronic back pain.    ROS:  CONSTITUTIONAL: NEGATIVE for fever, chills, change in weight  ENT/MOUTH: NEGATIVE for ear, mouth and throat problems  RESP: NEGATIVE for significant cough or SOB  CV: NEGATIVE for chest pain, palpitations or peripheral edema  GI: NEGATIVE for nausea, abdominal pain, heartburn, or change in bowel habits  MUSCULOSKELETAL: he goes to Pain clinic for back issues  PSYCHIATRIC:      OBJECTIVE:     /60  Pulse 82  Temp 98.6  F (37  C) (Oral)  Resp 16  Wt 251 lb 6.4 oz (114 kg)  SpO2 95%  BMI 35.99 kg/m2  Body mass index is 35.99 kg/(m^2).  GENERAL: healthy, alert and no distress  GENERAL: alert, no distress and obese  NECK: no adenopathy, no asymmetry, masses, or scars and thyroid normal to palpation  RESP: lungs clear to auscultation - no rales, rhonchi or wheezes  CV: regular rate and rhythm, normal S1 S2, no S3 or S4, no murmur, click or rub, no peripheral edema and peripheral pulses strong  ABDOMEN: soft, nontender, no hepatosplenomegaly, no masses and bowel sounds normal  MS: no gross musculoskeletal defects noted, no edema    Diagnostic Test Results:  Pending     ASSESSMENT/PLAN:         BMI:   Estimated body mass index is 35.99 kg/(m^2) as calculated from the following:    Height as of 2/21/18: 5' 10.08\" (1.78 m).    Weight as of this encounter: 251 lb 6.4 oz (114 kg).   Weight management plan: Low abigail diet/Exercise      1. Type 2 diabetes mellitus with stage 3 chronic kidney disease, with long-term current use of insulin (H)  Pt has had labs done at his endocrine Office-will get all records    - Hemoglobin A1c; Future    2. Hyperlipidemia LDL goal <100  Advised come fasting  - Lipid panel reflex to direct LDL Fasting; Future    3. Chronic pain syndrome  Pt on Lyrica    4. Anxiety  Advised cymbalta  SEE EPIC care orders  The potential side effects of this medication " have been discussed with the patient.  Call if any significant problems with these are experienced.  Follow up 6 weeks  - DULoxetine (CYMBALTA) 30 MG EC capsule; Take 1 capsule (30 mg) by mouth 2 times daily  Dispense: 60 capsule; Refill: 1    5. Urinary hesitancy  Pt is on flomax    6. Hypertension goal BP (blood pressure) < 140/90  controlled  - losartan (COZAAR) 25 MG tablet; Take 1 tablet (25 mg) by mouth daily  Dispense: 90 tablet; Refill: 3  -     7. Chronic kidney disease, stage III (moderate)  Stable     8. Screening for prostate cancer    - PSA, screen; Future  Asthma is stable     Work on weight loss  Regular exercise  We have had Him sign a medical Release and will wait for labs to come back  Total Time spent between my MA and me was over 1 hours  Trying to get all his medicines,and taking to him about his medical issues  At one Point he started getting upset at the  MA because she was asking him about his medicines and if he could sign ABRAHAM for this  Supervisor had to be called     Deborah Leslie MD  [facility]

## 2018-08-02 NOTE — LETTER
My Asthma Action Plan  Name: Sandra Dos Santos   YOB: 1955  Date: 8/2/2018   My doctor: Deborah Leslie MD   My clinic: [facility]        My Control Medicine: Fluticasone propionate (Flovent) -   mcg see med list  My Rescue Medicine: Albuterol (Proair/Ventolin/Proventil) inhaler see md list   My Asthma Severity: mild persistent  Avoid your asthma triggers: upper respiratory infections  upper respiratory infections            GREEN ZONE   Good Control    I feel good    No cough or wheeze    Can work, sleep and play without asthma symptoms       Take your asthma control medicine every day.     1. If exercise triggers your asthma, take your rescue medication    15 minutes before exercise or sports, and    During exercise if you have asthma symptoms  2. Spacer to use with inhaler: If you have a spacer, make sure to use it with your inhaler             YELLOW ZONE Getting Worse  I have ANY of these:    I do not feel good    Cough or wheeze    Chest feels tight    Wake up at night   1. Keep taking your Green Zone medications  2. Start taking your rescue medicine:    every 20 minutes for up to 1 hour. Then every 4 hours for 24-48 hours.  3. If you stay in the Yellow Zone for more than 12-24 hours, contact your doctor.  4. If you do not return to the Green Zone in 12-24 hours or you get worse, start taking your oral steroid medicine if prescribed by your provider.           RED ZONE Medical Alert - Get Help  I have ANY of these:    I feel awful    Medicine is not helping    Breathing getting harder    Trouble walking or talking    Nose opens wide to breathe       1. Take your rescue medicine NOW  2. If your provider has prescribed an oral steroid medicine, start taking it NOW  3. Call your doctor NOW  4. If you are still in the Red Zone after 20 minutes and you have not reached your doctor:    Take your rescue medicine again and    Call 911 or go to the emergency room right away    See your  regular doctor within 2 weeks of an Emergency Room or Urgent Care visit for follow-up treatment.          Annual Reminders:  Meet with Asthma Educator,  Flu Shot in the Fall, consider Pneumonia Vaccination for patients with asthma (aged 19 and older).    Pharmacy:    [facility] - [facility] [place], [place] - [address]  [facility] [place] [place] - [address]                      Asthma Triggers  How To Control Things That Make Your Asthma Worse    Triggers are things that make your asthma worse.  Look at the list below to help you find your triggers and what you can do about them.  You can help prevent asthma flare-ups by staying away from your triggers.      Trigger                                                          What you can do   Cigarette Smoke  Tobacco smoke can make asthma worse. Do not allow smoking in your home, car or around you.  Be sure no one smokes at a child s day care or school.  If you smoke, ask your health care provider for ways to help you quit.  Ask family members to quit too.  Ask your health care provider for a referral to Quit Plan to help you quit smoking, or call 7-994-002-PLAN.     Colds, Flu, Bronchitis  These are common triggers of asthma. Wash your hands often.  Don t touch your eyes, nose or mouth.  Get a flu shot every year.     Dust Mites  These are tiny bugs that live in cloth or carpet. They are too small to see. Wash sheets and blankets in hot water every week.   Encase pillows and mattress in dust mite proof covers.  Avoid having carpet if you can. If you have carpet, vacuum weekly.   Use a dust mask and HEPA vacuum.   Pollen and Outdoor Mold  Some people are allergic to trees, grass, or weed pollen, or molds. Try to keep your windows closed.  Limit time out doors when pollen count is high.   Ask you health care provider about taking medicine during allergy season.     Animal Dander  Some people are allergic to skin flakes, urine or saliva  from pets with fur or feathers. Keep pets with fur or feathers out of your home.    If you can t keep the pet outdoors, then keep the pet out of your bedroom.  Keep the bedroom door closed.  Keep pets off cloth furniture and away from stuffed toys.     Mice, Rats, and Cockroaches  Some people are allergic to the waste from these pests.   Cover food and garbage.  Clean up spills and food crumbs.  Store grease in the refrigerator.   Keep food out of the bedroom.   Indoor Mold  This can be a trigger if your home has high moisture. Fix leaking faucets, pipes, or other sources of water.   Clean moldy surfaces.  Dehumidify basement if it is damp and smelly.   Smoke, Strong Odors, and Sprays  These can reduce air quality. Stay away from strong odors and sprays, such as perfume, powder, hair spray, paints, smoke incense, paint, cleaning products, candles and new carpet.   Exercise or Sports  Some people with asthma have this trigger. Be active!  Ask your doctor about taking medicine before sports or exercise to prevent symptoms.    Warm up for 5-10 minutes before and after sports or exercise.     Other Triggers of Asthma  Cold air:  Cover your nose and mouth with a scarf.  Sometimes laughing or crying can be a trigger.  Some medicines and food can trigger asthma.

## 2018-08-02 NOTE — MR AVS SNAPSHOT
After Visit Summary   8/2/2018    Sandra Dos Santos    MRN: [id number]           Patient Information     Date Of Birth          1955        Visit Information        Provider Department      8/2/2018 10:15 AM Deborah Leslie MD; [facility]        Today's Diagnoses     Type 2 diabetes mellitus with stage 3 chronic kidney disease, with long-term current use of insulin (H)    -  1    Hyperlipidemia LDL goal <100        Chronic pain syndrome        Anxiety        Screening for prostate cancer        Urinary hesitancy        Hypertension goal BP (blood pressure) < 140/90        Chronic kidney disease, stage III (moderate)          Care Instructions    Please make a fasting lab appointment  Follow up in 6 weeks to check on cymbalta  I have refilled your Blood Pressure medicine  Deborah Leslie MD    [facility]    If you have any questions regarding to your visit please contact your care team:       Team Red:   Clinic Hours Telephone Number   Dr. Twyla Hutson, NP   7am-7pm  Monday - Thursday   7am-5pm  Fridays  [phone]  (Appointment scheduling available 24/7)    Questions about your recent visit?   Team Line  [phone]   Urgent Care - [place] [facility] - 11am-9pm Monday-Friday Saturday-Sunday- 9am-5pm   [place] - 5pm-9pm Monday-Friday Saturday-Sunday- 9am-5pm  [phone] - [place]  [phone] - [place]       What options do I have for a visit other than an office visit? We offer electronic visits (e-visits) and telephone visits, when medically appropriate.  Please check with your medical insurance to see if these types of visits are covered, as you will be responsible for any charges that are not paid by your insurance.      You can use APROOFED (secure electronic communication) to access to your chart, send your primary care provider a message, or make an appointment. Ask a  "team member how to get started.     For a price quote for your services, please call our Consumer Price Line at [phone] or our Imaging Cost estimation line at [phone] (for imaging tests).    Discharged by Monica ROBERTO CMA ([facility])            Follow-ups after your visit        Future tests that were ordered for you today     Open Future Orders        Priority Expected Expires Ordered    Hemoglobin A1c Routine  2019    Lipid panel reflex to direct LDL Fasting Routine  10/2/2018 2018    PSA, screen Routine  2019            Who to contact     If you have questions or need follow up information about today's clinic visit or your schedule please contact [facility] directly at [phone].  Normal or non-critical lab and imaging results will be communicated to you by [employer], letter or phone within 4 business days after the clinic has received the results. If you do not hear from us within 7 days, please contact the clinic through [employer] or phone. If you have a critical or abnormal lab result, we will notify you by phone as soon as possible.  Submit refill requests through ZIIBRA or call your pharmacy and they will forward the refill request to us. Please allow 3 business days for your refill to be completed.          Additional Information About Your Visit        [employer] Information     ZIIBRA lets you send messages to your doctor, view your test results, renew your prescriptions, schedule appointments and more. To sign up, go to [email or web address] . Click on \"Log in\" on the left side of the screen, which will take you to the Welcome page. Then click on \"Sign up Now\" on the right side of the page.     You will be asked to enter the access code listed below, as well as some personal information. Please follow the directions to create your username and password.     Your access code is: [email or web address]  Expires: 10/31/2018 12:14 PM     Your access code will  in " 90 days. If you need help or a new code, please call your [place] clinic or [phone].        Care EveryWhere ID     This is your Care EveryWhere ID. This could be used by other organizations to access your [place] medical records  [id number]        Your Vitals Were     Pulse Temperature Respirations Pulse Oximetry BMI (Body Mass Index)       82 98.6  F (37  C) (Oral) 16 95% 35.99 kg/m2        Blood Pressure from Last 3 Encounters:   08/02/18 132/60   04/23/18 126/74   02/21/18 112/54    Weight from Last 3 Encounters:   08/02/18 251 lb 6.4 oz (114 kg)   04/23/18 256 lb (116.1 kg)   02/21/18 261 lb (118.4 kg)              We Performed the Following     HEMOGLOBIN A1C          Today's Medication Changes          These changes are accurate as of 8/2/18 12:14 PM.  If you have any questions, ask your nurse or doctor.               Start taking these medicines.        Dose/Directions    DULoxetine 30 MG EC capsule   Commonly known as:  CYMBALTA   Used for:  Anxiety   Started by:  Deborah Leslie MD        Dose:  30 mg   Take 1 capsule (30 mg) by mouth 2 times daily   Quantity:  60 capsule   Refills:  1         These medicines have changed or have updated prescriptions.        Dose/Directions    losartan 25 MG tablet   Commonly known as:  COZAAR   This may have changed:  See the new instructions.   Used for:  Hypertension goal BP (blood pressure) < 140/90   Changed by:  Deborah Leslie MD        Dose:  25 mg   Take 1 tablet (25 mg) by mouth daily   Quantity:  90 tablet   Refills:  3       tamsulosin 0.4 MG capsule   Commonly known as:  FLOMAX   This may have changed:  See the new instructions.   Used for:  Hypertension goal BP (blood pressure) < 140/90   Changed by:  Deborah Leslie MD        Dose:  0.4 mg   Take 1 capsule (0.4 mg) by mouth daily   Quantity:  90 capsule   Refills:  3            Where to get your medicines      These medications were sent to [facility] [place]  [place], [place] - [address]  [address] [place] [address]     Phone:  [phone]     DULoxetine 30 MG EC capsule    losartan 25 MG tablet    tamsulosin 0.4 MG capsule                Primary Care Provider Office Phone # Fax #    Deborah Leslie [phone] [address]  [place] [address]        Equal Access to Services     [facility]: Hadii aad ku hadasho Soomaali, waaxda luqadaha, qaybta kaalmada adeegyada, waxay idiin hayaan adeeg kharash la'aan . So [facility] [phone].    ATENCIÓN: Si habla español, tiene a fletcher disposición servicios gratuitos de asistencia lingüística. Martiname al [phone].    We comply with applicable federal civil rights laws and [place] laws. We do not discriminate on the basis of race, color, national origin, age, disability, sex, sexual orientation, or gender identity.            Thank you!     Thank you for choosing [facility]  for your care. Our goal is always to provide you with excellent care. Hearing back from our patients is one way we can continue to improve our services. Please take a few minutes to complete the written survey that you may receive in the mail after your visit with us. Thank you!             Your Updated Medication List - Protect others around you: Learn how to safely use, store and throw away your medicines at www.disposemymeds.org.          This list is accurate as of 8/2/18 12:14 PM.  Always use your most recent med list.                   Brand Name Dispense Instructions for use Diagnosis    * acetaminophen 325 MG tablet    TYLENOL    100 tablet    Take 1-2 tablets (325-650 mg) by mouth every 6 hours as needed for mild pain    Chronic pain syndrome       * acetaminophen 325 MG tablet    TYLENOL     Take 650 mg by mouth        * albuterol (2.5 MG/3ML) 0.083% neb solution     25 vial    Take 1 vial (2.5 mg) by nebulization every 4 hours as needed for shortness of breath / dyspnea or wheezing    Intermittent asthma, uncomplicated       *  albuterol 108 (90 Base) MCG/ACT Inhaler    PROAIR HFA/PROVENTIL HFA/VENTOLIN HFA    1 Inhaler    Inhale 2 puffs into the lungs every 4 hours as needed for shortness of breath / dyspnea or wheezing    Intermittent asthma, uncomplicated       ASPIRIN NOT PRESCRIBED    INTENTIONAL     Antiplatelet medication not prescribed intentionally due to possible aneurysm seen on CT        atorvastatin 10 MG tablet    LIPITOR     Take 10 mg by mouth daily        B-12 1000 MCG Tbcr     30 tablet    TAKE 1 TABLETS BY MOUTH EVERY DAY    Pernicious anemia       cholecalciferol 1000 UNIT tablet    vitamin D3    100 tablet    Take 1 tablet (1,000 Units) by mouth daily    Preventative health care       diclofenac 1 % Gel topical gel    VOLTAREN          DULoxetine 30 MG EC capsule    CYMBALTA    60 capsule    Take 1 capsule (30 mg) by mouth 2 times daily    Anxiety       FLOVENT  MCG/ACT Inhaler   Generic drug:  fluticasone     12 g    INHALE 2 PUFFS INTO THE LUNGS 2 TIMES DAILY    Moderate asthma with exacerbation, unspecified whether persistent       glimepiride 4 MG tablet    AMARYL    60 tablet    Take 1 tablet (4 mg) by mouth 2 times daily Needs MD visit    Uncontrolled type 2 diabetes mellitus with stage 3 chronic kidney disease, with long-term current use of insulin (H)       GLOBAL EASE INJECT PEN NEEDLES 31G X 5 MM   Generic drug:  insulin pen needle     100 each    Use daily        insulin glargine U-300 300 UNIT/ML injection    TOUJEO SOLOSTAR    15 mL    Inject 18 Units Subcutaneous At Bedtime    Uncontrolled type 2 diabetes mellitus with stage 3 chronic kidney disease, with long-term current use of insulin (H)       INVOKAMET -1000 MG Tb24   Generic drug:  Canagliflozin-Metformin HCl ER      Take by mouth 2 times daily        INVOKANA 100 MG tablet   Generic drug:  canagliflozin     90 tablet    Take 1 tablet (100 mg) by mouth every morning (before breakfast)        lidocaine-prilocaine cream    EMLA    5800 g     Apply topically as needed for moderate pain        losartan 25 MG tablet    COZAAR    90 tablet    Take 1 tablet (25 mg) by mouth daily    Hypertension goal BP (blood pressure) < 140/90       LYRICA PO      Take 150 mg by mouth 2 times daily        order for DME     400 each    Lancets.  Four times daily and prn.    Type 2 diabetes mellitus with stage 3 chronic kidney disease, with long-term current use of insulin (H)       order for DME     400 each    Test strips for pt's glucometer, brand as covered by insurance. Test four times daily and prn.    Type 2 diabetes mellitus with stage 3 chronic kidney disease, with long-term current use of insulin (H)       oxyCODONE IR 5 MG tablet    ROXICODONE    14 tablet    Take 1 tablet (5 mg) by mouth every 6 hours as needed for severe pain    Chronic pain syndrome, DDD (degenerative disc disease), lumbar, Spinal stenosis of lumbar region, unspecified whether neurogenic claudication present       predniSONE 20 MG tablet    DELTASONE    20 tablet    Take 3 tabs (60 mg) by mouth daily x 3 days, 2 tabs (40 mg) daily x 3 days, 1 tab (20 mg) daily x 3 days, then 1/2 tab (10 mg) x 3 days.    Moderate asthma with exacerbation, unspecified whether persistent       ranitidine 150 MG tablet    ZANTAC    180 tablet    Take 1 tablet (150 mg) by mouth 2 times daily    Gastroesophageal reflux disease without esophagitis       tamsulosin 0.4 MG capsule    FLOMAX    90 capsule    Take 1 capsule (0.4 mg) by mouth daily    Hypertension goal BP (blood pressure) < 140/90       thiamine 100 MG tablet      Take 100 mg by mouth daily        * Notice:  This list has 4 medication(s) that are the same as other medications prescribed for you. Read the directions carefully, and ask your doctor or other care provider to review them with you.

## 2018-08-02 NOTE — PATIENT INSTRUCTIONS
Please make a fasting lab appointment  Follow up in 6 weeks to check on cymbalta  I have refilled your Blood Pressure medicine  Deborah Leslie MD    [facility]    If you have any questions regarding to your visit please contact your care team:       Team Red:   Clinic Hours Telephone Number   Dr. Twyla Hutson, NP   7am-7pm  Monday - Thursday   7am-5pm  Fridays  [phone]  (Appointment scheduling available 24/7)    Questions about your recent visit?   Team Line  [phone]   Urgent Care - [place] and [facility] - 11am-9pm Monday-Friday Saturday-Sunday- 9am-5pm   [place] - 5pm-9pm Monday-Friday Saturday-Sunday- 9am-5pm  [phone] - [place]  [phone] - [place]       What options do I have for a visit other than an office visit? We offer electronic visits (e-visits) and telephone visits, when medically appropriate.  Please check with your medical insurance to see if these types of visits are covered, as you will be responsible for any charges that are not paid by your insurance.      You can use Medius (secure electronic communication) to access to your chart, send your primary care provider a message, or make an appointment. Ask a team member how to get started.     For a price quote for your services, please call our Consumer Price Line at [phone] or our Imaging Cost estimation line at [phone] (for imaging tests).    Discharged by Monica ROBERTO CMA ([facility])

## 2018-08-03 ASSESSMENT — ASTHMA QUESTIONNAIRES: ACT_TOTALSCORE: 22

## 2018-08-03 ASSESSMENT — ANXIETY QUESTIONNAIRES: GAD7 TOTAL SCORE: 7

## 2018-08-07 RX ORDER — GLIMEPIRIDE 4 MG/1
4 TABLET ORAL
Refills: 1 | COMMUNITY
Start: 2018-08-07 | End: 2018-10-03

## 2018-08-15 DIAGNOSIS — Z12.5 SCREENING FOR PROSTATE CANCER: ICD-10-CM

## 2018-08-15 DIAGNOSIS — Z79.4 TYPE 2 DIABETES MELLITUS WITH STAGE 3 CHRONIC KIDNEY DISEASE, WITH LONG-TERM CURRENT USE OF INSULIN (H): ICD-10-CM

## 2018-08-15 DIAGNOSIS — E78.5 HYPERLIPIDEMIA LDL GOAL <100: ICD-10-CM

## 2018-08-15 DIAGNOSIS — N18.30 TYPE 2 DIABETES MELLITUS WITH STAGE 3 CHRONIC KIDNEY DISEASE, WITH LONG-TERM CURRENT USE OF INSULIN (H): ICD-10-CM

## 2018-08-15 DIAGNOSIS — E11.22 TYPE 2 DIABETES MELLITUS WITH STAGE 3 CHRONIC KIDNEY DISEASE, WITH LONG-TERM CURRENT USE OF INSULIN (H): ICD-10-CM

## 2018-08-15 LAB
CHOLEST SERPL-MCNC: 142 MG/DL
HBA1C MFR BLD: 7.7 % (ref 0–5.6)
HDLC SERPL-MCNC: 51 MG/DL
LDLC SERPL CALC-MCNC: 62 MG/DL
NONHDLC SERPL-MCNC: 91 MG/DL
PSA SERPL-ACNC: 0.93 UG/L (ref 0–4)
TRIGL SERPL-MCNC: 144 MG/DL

## 2018-08-15 PROCEDURE — 83036 HEMOGLOBIN GLYCOSYLATED A1C: CPT | Performed by: FAMILY MEDICINE

## 2018-08-15 PROCEDURE — G0103 PSA SCREENING: HCPCS | Performed by: FAMILY MEDICINE

## 2018-08-15 PROCEDURE — 36415 COLL VENOUS BLD VENIPUNCTURE: CPT | Performed by: FAMILY MEDICINE

## 2018-08-15 PROCEDURE — 80061 LIPID PANEL: CPT | Performed by: FAMILY MEDICINE

## 2018-08-15 RX ORDER — ATORVASTATIN CALCIUM 10 MG/1
10 TABLET, FILM COATED ORAL DAILY
Qty: 90 TABLET | Refills: 3 | Status: SHIPPED | OUTPATIENT
Start: 2018-08-15

## 2018-08-15 NOTE — LETTER
[address] [place] [address]    August 15, 2018    Sandra Dos Santos  [address] [address]          Dear Sandra,    Cholesterol is Good   Diabetic test is better   Prostate test is normal   Follow strict Diabetic Diet    Enclosed is a copy of your results.     Results for orders placed or performed in visit on 08/15/18   Lipid panel reflex to direct LDL Fasting   Result Value Ref Range    Cholesterol 142 <200 mg/dL    Triglycerides 144 <150 mg/dL    HDL Cholesterol 51 >39 mg/dL    LDL Cholesterol Calculated 62 <100 mg/dL    Non HDL Cholesterol 91 <130 mg/dL   PSA, screen   Result Value Ref Range    PSA 0.93 0 - 4 ug/L   Hemoglobin A1c   Result Value Ref Range    Hemoglobin A1C 7.7 (H) 0 - 5.6 %       If you have any questions or concerns, please call myself or my nurse at [phone].      Sincerely,        Deborah Leslie MD /jones

## 2018-08-20 ENCOUNTER — OFFICE VISIT (OUTPATIENT)
Dept: FAMILY MEDICINE | Facility: CLINIC | Age: 63
End: 2018-08-20
Payer: MEDICARE

## 2018-08-20 VITALS
BODY MASS INDEX: 36.13 KG/M2 | HEART RATE: 79 BPM | TEMPERATURE: 97.9 F | WEIGHT: 252.4 LBS | RESPIRATION RATE: 16 BRPM | OXYGEN SATURATION: 98 % | SYSTOLIC BLOOD PRESSURE: 124 MMHG | DIASTOLIC BLOOD PRESSURE: 72 MMHG | HEIGHT: 70 IN

## 2018-08-20 DIAGNOSIS — J45.901 MODERATE ASTHMA WITH EXACERBATION, UNSPECIFIED WHETHER PERSISTENT: ICD-10-CM

## 2018-08-20 PROCEDURE — 99214 OFFICE O/P EST MOD 30 MIN: CPT | Performed by: FAMILY MEDICINE

## 2018-08-20 RX ORDER — ALBUTEROL SULFATE 90 UG/1
2 AEROSOL, METERED RESPIRATORY (INHALATION) EVERY 4 HOURS PRN
Qty: 1 INHALER | Refills: 11 | Status: SHIPPED | OUTPATIENT
Start: 2018-08-20

## 2018-08-20 RX ORDER — ALBUTEROL SULFATE 0.83 MG/ML
2.5 SOLUTION RESPIRATORY (INHALATION) EVERY 4 HOURS PRN
Qty: 25 VIAL | Refills: 1 | Status: SHIPPED | OUTPATIENT
Start: 2018-08-20

## 2018-08-20 RX ORDER — PREDNISONE 10 MG/1
TABLET ORAL
Qty: 30 TABLET | Refills: 0 | Status: SHIPPED | OUTPATIENT
Start: 2018-08-20

## 2018-08-20 NOTE — PATIENT INSTRUCTIONS
[facility]    If you have any questions regarding to your visit please contact your care team:       Team Red:   Clinic Hours Telephone Number   Dr. Twyla Hutson, NP   7am-7pm  Monday - Thursday   7am-5pm  Fridays  [phone]  (Appointment scheduling available 24/7)    Questions about your recent visit?   Team Line  [phone]   Urgent Care - [place] and [facility] - 11am-9pm Monday-Friday Saturday-Sunday- 9am-5pm   [place] - 5pm-9pm Monday-Friday Saturday-Sunday- 9am-5pm  [phone] - [place]  [phone] - [place]       What options do I have for a visit other than an office visit? We offer electronic visits (e-visits) and telephone visits, when medically appropriate.  Please check with your medical insurance to see if these types of visits are covered, as you will be responsible for any charges that are not paid by your insurance.      You can use Pollen (secure electronic communication) to access to your chart, send your primary care provider a message, or make an appointment. Ask a team member how to get started.     For a price quote for your services, please call our Consumer Price Line at [phone] or our Imaging Cost estimation line at [phone] (for imaging tests).

## 2018-08-20 NOTE — PROGRESS NOTES
SUBJECTIVE:   Sandra Dos Santos is a 63 year old male who presents to clinic today for the following health issues:      Asthma Follow-Up  Wheezing 10 days  No fever   No chills  Cough nonprodutive         Recent asthma triggers that patient is dealing with: allergies?                Problem list and histories reviewed & adjusted, as indicated.  Additional history: as documented    Patient Active Problem List   Diagnosis     HYPERLIPIDEMIA LDL GOAL <100     Chronic kidney disease, stage III (moderate)     Gastroesophageal reflux disease     Hypothyroidism     Urinary hesitancy     Type 2 diabetes mellitus with stage 3 chronic kidney disease, with long-term current use of insulin (H)     Chronic pain syndrome     Morbid obesity due to excess calories (H)     Benign prostatic hyperplasia, presence of lower urinary tract symptoms unspecified, unspecified morphology     Gastroesophageal reflux disease without esophagitis     Hypertension goal BP (blood pressure) < 140/90     Lumbar stenosis     DDD (degenerative disc disease), lumbar     Osteoarthritis of lumbar spine, unspecified spinal osteoarthritis complication status     Facet arthropathy     Osteoarthritis of spine with radiculopathy, lumbosacral region     Other spondylosis, lumbosacral region     Aneurysm of internal carotid artery     Uncontrolled type 2 diabetes mellitus with stage 3 chronic kidney disease, with long-term current use of insulin (H)     Anxiety     Lumbago     Mild persistent asthma without complication     Past Surgical History:   Procedure Laterality Date     ARTHROSCOPY SHOULDER RT/LT Right      JOINT REPLACEMTN, KNEE RT/LT Bilateral 2013  ,2012    Joint Replacement knee RT/LT       Social History   Substance Use Topics     Smoking status: Never Smoker     Smokeless tobacco: Never Used     Alcohol use No     Family History   Problem Relation Age of Onset     Diabetes Father      Prostate Cancer No family hx of      Colon Cancer No family hx of       Breast Cancer No family hx of          Current Outpatient Prescriptions   Medication Sig Dispense Refill     acetaminophen (TYLENOL) 325 MG tablet Take 1-2 tablets (325-650 mg) by mouth every 6 hours as needed for mild pain 100 tablet 1     albuterol (2.5 MG/3ML) 0.083% neb solution Take 1 vial (2.5 mg) by nebulization every 4 hours as needed for shortness of breath / dyspnea or wheezing 25 vial 1     albuterol (PROAIR HFA/PROVENTIL HFA/VENTOLIN HFA) 108 (90 Base) MCG/ACT inhaler Inhale 2 puffs into the lungs every 4 hours as needed for shortness of breath / dyspnea or wheezing 1 Inhaler 11     ASPIRIN NOT PRESCRIBED (INTENTIONAL) Antiplatelet medication not prescribed intentionally due to possible aneurysm seen on CT       atorvastatin (LIPITOR) 10 MG tablet Take 1 tablet (10 mg) by mouth daily 90 tablet 3     Canagliflozin-Metformin HCl ER (INVOKAMET XR) 150-1000 MG TB24 Take by mouth 2 times daily       cholecalciferol (VITAMIN D) 1000 UNIT tablet Take 1 tablet (1,000 Units) by mouth daily 100 tablet 3     Cyanocobalamin (B-12) 1000 MCG TBCR TAKE 1 TABLETS BY MOUTH EVERY DAY 30 tablet 8     diclofenac (VOLTAREN) 1 % GEL topical gel        DULoxetine (CYMBALTA) 30 MG EC capsule Take 1 capsule (30 mg) by mouth 2 times daily 60 capsule 1     FLOVENT  MCG/ACT Inhaler INHALE 2 PUFFS INTO THE LUNGS 2 TIMES DAILY 12 g 3     glimepiride (AMARYL) 4 MG tablet Take 1 tablet (4 mg) by mouth 2 times daily (before meals)  1     losartan (COZAAR) 25 MG tablet Take 1 tablet (25 mg) by mouth daily 90 tablet 3     order for DME Test strips for pt's glucometer, brand as covered by insurance. Test four times daily and prn. 400 each 4     order for DME Lancets.  Four times daily and prn. 400 each 4     predniSONE (DELTASONE) 10 MG tablet 40 mg daily x 3 days, 30 mg daily x 3 days, 20 mg daily x 3 days ,10 mg daily x 3 days 30 tablet 0     Pregabalin (LYRICA PO) Take 150 mg by mouth 2 times daily       ranitidine (ZANTAC)  150 MG tablet Take 1 tablet (150 mg) by mouth 2 times daily 180 tablet 3     tamsulosin (FLOMAX) 0.4 MG capsule Take 1 capsule (0.4 mg) by mouth daily 90 capsule 3     thiamine (VITAMIN B-1) 100 MG tablet Take 100 mg by mouth daily       VITAMIN B-1 100 MG tablet TAKE 1 TABLET (100 MG) BY MOUTH DAILY 100 tablet 0     GLOBAL EASE INJECT PEN NEEDLES 31G X 5 MM Use daily (Patient not taking: Reported on 8/20/2018) 100 each 3     lidocaine-prilocaine (EMLA) cream Apply topically as needed for moderate pain (Patient not taking: Reported on 8/20/2018) 5800 g 1     oxyCODONE IR (ROXICODONE) 5 MG tablet Take 1 tablet (5 mg) by mouth every 6 hours as needed for severe pain (Patient not taking: Reported on 8/2/2018) 14 tablet 0     [DISCONTINUED] albuterol (2.5 MG/3ML) 0.083% neb solution Take 1 vial (2.5 mg) by nebulization every 4 hours as needed for shortness of breath / dyspnea or wheezing 25 vial 1     [DISCONTINUED] albuterol (PROAIR HFA/PROVENTIL HFA/VENTOLIN HFA) 108 (90 BASE) MCG/ACT Inhaler Inhale 2 puffs into the lungs every 4 hours as needed for shortness of breath / dyspnea or wheezing 1 Inhaler 11     Allergies   Allergen Reactions     Asa [Aspirin] Other (See Comments)     dizzy     Metformin GI Disturbance     Morphine Itching     Itching     Recent Labs   Lab Test  08/15/18   0924 06/04/18 03/14/18 02/07/18 11/01/17   1455   01/04/17   0757  11/29/16   1101   10/15/12   1108   A1C  7.7*  8.7*  8.4*  8.0*  7.9*   < >  7.8*  8.1*   < >   --    LDL  62   --    --    --    --    --   73  122*   --    --    HDL  51   --    --    --    --    --   50  45   --    --    TRIG  144   --    --    --    --    --   197*  226*   --    --    ALT   --    --   26   --    --    --    --   26   --   18   CR   --   1.19   --   1.1  1.21   < >   --   1.22   --   0.80   GFRESTIMATED   --   >60   --   72  61   < >   --   60*   --   >90   GFRESTBLACK   --   >60   --    --   73   < >   --   73   --   >90   POTASSIUM   --   4.4  4.4   "4.3   --    < >   --   4.6   --   4.2   TSH   --    --    --    --    --    --    --   2.58   --   1.16    < > = values in this interval not displayed.      BP Readings from Last 3 Encounters:   08/20/18 124/72   08/02/18 132/60   04/23/18 126/74    Wt Readings from Last 3 Encounters:   08/20/18 252 lb 6.4 oz (114.5 kg)   08/02/18 251 lb 6.4 oz (114 kg)   04/23/18 256 lb (116.1 kg)                  Labs reviewed in EPIC    Reviewed and updated as needed this visit by clinical staff       Reviewed and updated as needed this visit by Provider         ROS:  CONSTITUTIONAL: NEGATIVE for fever, chills, change in weight  INTEGUMENTARY/SKIN: NEGATIVE for worrisome rashes, moles or lesions  ENT/MOUTH: NEGATIVE for ear, mouth and throat problems  RESP:as above  CV: NEGATIVE for chest pain, palpitations or peripheral edema  GI: NEGATIVE for nausea, abdominal pain, heartburn, or change in bowel habits  MUSCULOSKELETAL: NEGATIVE for significant arthralgias or myalgia    OBJECTIVE:     /72  Pulse 79  Temp 97.9  F (36.6  C) (Oral)  Resp 16  Ht 5' 10.08\" (1.78 m)  Wt 252 lb 6.4 oz (114.5 kg)  SpO2 98%  BMI 36.13 kg/m2  Body mass index is 36.13 kg/(m^2).  GENERAL: healthy, alert and no distress  EYES: Eyes grossly normal to inspection, PERRL and conjunctivae and sclerae normal  HENT: ear canals and TM's normal, nose and mouth without ulcers or lesions  NECK: no adenopathy, no asymmetry, masses, or scars and thyroid normal to palpation  RESP: expiratory wheezes Bilaterally  No rales or Rhonchi  CV: regular rate and rhythm, normal S1 S2, no S3 or S4, no murmur, click or rub, no peripheral edema and peripheral pulses strong  ABDOMEN: soft, nontender, no hepatosplenomegaly, no masses and bowel sounds normal  MS: no gross musculoskeletal defects noted, no edema    Diagnostic Test Results:  none     ASSESSMENT/PLAN:           ICD-10-CM    1. Moderate asthma with exacerbation, unspecified whether persistent J45.901 albuterol " (2.5 MG/3ML) 0.083% neb solution     albuterol (PROAIR HFA/PROVENTIL HFA/VENTOLIN HFA) 108 (90 Base) MCG/ACT inhaler     predniSONE (DELTASONE) 10 MG tablet   SEE EPIC care orders  The potential side effects of this medication have been discussed with the patient.  Call if any significant problems with these are experienced.  Follow up 1 week if not better/sooner if worse  Deborah Leslie MD  [facility]

## 2018-08-20 NOTE — MR AVS SNAPSHOT
After Visit Summary   8/20/2018    Sandra Dos Santos    MRN: [id number]           Patient Information     Date Of Birth          1955        Visit Information        Provider Department      8/20/2018 2:45 PM Deborah Leslie MD; LANGUAGE [facility]        Today's Diagnoses     Moderate asthma with exacerbation, unspecified whether persistent          Care Instructions    [facility]    If you have any questions regarding to your visit please contact your care team:       Team Red:   Clinic Hours Telephone Number   Dr. Twyla Hutson, NP   7am-7pm  Monday - Thursday   7am-5pm  Fridays  [phone]  (Appointment scheduling available 24/7)    Questions about your recent visit?   Team Line  [phone]   Urgent Care - [place] and [facility] - 11am-9pm Monday-Friday Saturday-Sunday- 9am-5pm   [place] - 5pm-9pm Monday-Friday Saturday-Sunday- 9am-5pm  [phone] - [place]  [phone] - [place]       What options do I have for a visit other than an office visit? We offer electronic visits (e-visits) and telephone visits, when medically appropriate.  Please check with your medical insurance to see if these types of visits are covered, as you will be responsible for any charges that are not paid by your insurance.      You can use [employer] Mobile (secure electronic communication) to access to your chart, send your primary care provider a message, or make an appointment. Ask a team member how to get started.     For a price quote for your services, please call our Consumer Price Line at [phone] or our Imaging Cost estimation line at [phone] (for imaging tests).              Follow-ups after your visit        Your next 10 appointments already scheduled     Sep 04, 2018 11:10 AM CDT   SHORT with Deborah Leslie MD   [facility] ([facility])    [address] [place] [address]  [place]  [id number] [phone]              Who to contact     If you have questions or need follow up information about today's clinic visit or your schedule please contact [facility] directly at [phone].  Normal or non-critical lab and imaging results will be communicated to you by MyChart, letter or phone within 4 business days after the clinic has received the results. If you do not hear from us within 7 days, please contact the clinic through Zukihart or phone. If you have a critical or abnormal lab result, we will notify you by phone as soon as possible.  Submit refill requests through Tealium or call your pharmacy and they will forward the refill request to us. Please allow 3 business days for your refill to be completed.          Additional Information About Your Visit        Zukihar[employer] Sonotek Information     Tealium lets you send messages to your doctor, view your test results, renew your prescriptions, schedule appointments and more. To sign up, go to [email or web address] . Click on \"Log in\" on the left side of the screen, which will take you to the Welcome page. Then click on \"Sign up Now\" on the right side of the page.     You will be asked to enter the access code listed below, as well as some personal information. Please follow the directions to create your username and password.     Your access code is: [email or web address]  Expires: 10/31/2018 12:14 PM     Your access code will  in 90 days. If you need help or a new code, please call your [facility] clinic or [phone].        Care EveryWhere ID     This is your Care EveryWhere ID. This could be used by other organizations to access your [facility] medical records  [id number]        Your Vitals Were     Pulse Temperature Respirations Height Pulse Oximetry BMI (Body Mass Index)    79 97.9  F (36.6  C) (Oral) 16 5' 10.08\" (1.78 m) 98% 36.13 kg/m2       Blood Pressure from Last 3 Encounters:   18 124/72   18 132/60   18 126/74    " Weight from Last 3 Encounters:   08/20/18 252 lb 6.4 oz (114.5 kg)   08/02/18 251 lb 6.4 oz (114 kg)   04/23/18 256 lb (116.1 kg)              Today, you had the following     No orders found for display         Today's Medication Changes          These changes are accurate as of 8/20/18  4:18 PM.  If you have any questions, ask your nurse or doctor.               These medicines have changed or have updated prescriptions.        Dose/Directions    predniSONE 10 MG tablet   Commonly known as:  DELTASONE   This may have changed:    - medication strength  - additional instructions   Used for:  Moderate asthma with exacerbation, unspecified whether persistent   Changed by:  Deborah Leslie MD        40 mg daily x 3 days, 30 mg daily x 3 days, 20 mg daily x 3 days ,10 mg daily x 3 days   Quantity:  30 tablet   Refills:  0         Stop taking these medicines if you haven't already. Please contact your care team if you have questions.     insulin glargine U-300 300 UNIT/ML injection   Commonly known as:  TOUJEO SOLOSTAR   Stopped by:  Deborah Leslie MD                Where to get your medicines      These medications were sent to [facility] [place], [place] - [address]  [address], [facility] [address]     Phone:  [phone]     albuterol (2.5 MG/3ML) 0.083% neb solution    albuterol 108 (90 Base) MCG/ACT inhaler    predniSONE 10 MG tablet                Primary Care Provider Office Phone # Fax #    Deborah Leslie [phone]       [address]  [facility] [address]        Equal Access to Services     [facility]: Hadii more duke hadasho Soomaali, waaxda luqadaha, qaybta kaalmada adeegleon, araseli liao . So [facility] [phone].    ATENCIÓN: Si habla español, tiene a fletcher disposición servicios gratuitos de asistencia lingüística. Llame al [phone].    We comply with applicable federal civil rights laws and [place] laws. We do not discriminate on the  basis of race, color, national origin, age, disability, sex, sexual orientation, or gender identity.            Thank you!     Thank you for choosing [facility]  for your care. Our goal is always to provide you with excellent care. Hearing back from our patients is one way we can continue to improve our services. Please take a few minutes to complete the written survey that you may receive in the mail after your visit with us. Thank you!             Your Updated Medication List - Protect others around you: Learn how to safely use, store and throw away your medicines at www.disposemymeds.org.          This list is accurate as of 8/20/18  4:18 PM.  Always use your most recent med list.                   Brand Name Dispense Instructions for use Diagnosis    acetaminophen 325 MG tablet    TYLENOL    100 tablet    Take 1-2 tablets (325-650 mg) by mouth every 6 hours as needed for mild pain    Chronic pain syndrome       * albuterol (2.5 MG/3ML) 0.083% neb solution     25 vial    Take 1 vial (2.5 mg) by nebulization every 4 hours as needed for shortness of breath / dyspnea or wheezing    Moderate asthma with exacerbation, unspecified whether persistent       * albuterol 108 (90 Base) MCG/ACT inhaler    PROAIR HFA/PROVENTIL HFA/VENTOLIN HFA    1 Inhaler    Inhale 2 puffs into the lungs every 4 hours as needed for shortness of breath / dyspnea or wheezing    Moderate asthma with exacerbation, unspecified whether persistent       AMARYL 4 MG tablet   Generic drug:  glimepiride      Take 1 tablet (4 mg) by mouth 2 times daily (before meals)        ASPIRIN NOT PRESCRIBED    INTENTIONAL     Antiplatelet medication not prescribed intentionally due to possible aneurysm seen on CT        atorvastatin 10 MG tablet    LIPITOR    90 tablet    Take 1 tablet (10 mg) by mouth daily    Hyperlipidemia LDL goal <100       B-12 1000 MCG Tbcr     30 tablet    TAKE 1 TABLETS BY MOUTH EVERY DAY    Pernicious anemia        cholecalciferol 1000 UNIT tablet    vitamin D3    100 tablet    Take 1 tablet (1,000 Units) by mouth daily    Preventative health care       diclofenac 1 % Gel topical gel    VOLTAREN          DULoxetine 30 MG EC capsule    CYMBALTA    60 capsule    Take 1 capsule (30 mg) by mouth 2 times daily    Anxiety       FLOVENT  MCG/ACT Inhaler   Generic drug:  fluticasone     12 g    INHALE 2 PUFFS INTO THE LUNGS 2 TIMES DAILY    Moderate asthma with exacerbation, unspecified whether persistent       GLOBAL EASE INJECT PEN NEEDLES 31G X 5 MM   Generic drug:  insulin pen needle     100 each    Use daily        INVOKAMET -1000 MG Tb24   Generic drug:  Canagliflozin-Metformin HCl ER      Take by mouth 2 times daily        lidocaine-prilocaine cream    EMLA    5800 g    Apply topically as needed for moderate pain        losartan 25 MG tablet    COZAAR    90 tablet    Take 1 tablet (25 mg) by mouth daily    Hypertension goal BP (blood pressure) < 140/90       LYRICA PO      Take 150 mg by mouth 2 times daily        order for DME     400 each    Lancets.  Four times daily and prn.    Type 2 diabetes mellitus with stage 3 chronic kidney disease, with long-term current use of insulin (H)       order for DME     400 each    Test strips for pt's glucometer, brand as covered by insurance. Test four times daily and prn.    Type 2 diabetes mellitus with stage 3 chronic kidney disease, with long-term current use of insulin (H)       oxyCODONE IR 5 MG tablet    ROXICODONE    14 tablet    Take 1 tablet (5 mg) by mouth every 6 hours as needed for severe pain    Chronic pain syndrome, DDD (degenerative disc disease), lumbar, Spinal stenosis of lumbar region, unspecified whether neurogenic claudication present       predniSONE 10 MG tablet    DELTASONE    30 tablet    40 mg daily x 3 days, 30 mg daily x 3 days, 20 mg daily x 3 days ,10 mg daily x 3 days    Moderate asthma with exacerbation, unspecified whether persistent        ranitidine 150 MG tablet    ZANTAC    180 tablet    Take 1 tablet (150 mg) by mouth 2 times daily    Gastroesophageal reflux disease without esophagitis       tamsulosin 0.4 MG capsule    FLOMAX    90 capsule    Take 1 capsule (0.4 mg) by mouth daily    Hypertension goal BP (blood pressure) < 140/90       * thiamine 100 MG tablet      Take 100 mg by mouth daily        * thiamine 100 MG tablet     100 tablet    TAKE 1 TABLET (100 MG) BY MOUTH DAILY    Type 2 diabetes mellitus with stage 3 chronic kidney disease, with long-term current use of insulin (H)       * Notice:  This list has 4 medication(s) that are the same as other medications prescribed for you. Read the directions carefully, and ask your doctor or other care provider to review them with you.

## 2018-09-25 PROBLEM — M48.061 LUMBAR STENOSIS: Status: RESOLVED | Noted: 2017-01-04 | Resolved: 2018-09-25

## 2018-10-02 DIAGNOSIS — J45.901 MODERATE ASTHMA WITH EXACERBATION, UNSPECIFIED WHETHER PERSISTENT: ICD-10-CM

## 2018-10-02 DIAGNOSIS — F41.9 ANXIETY: ICD-10-CM

## 2018-10-03 DIAGNOSIS — Z79.4 TYPE 2 DIABETES MELLITUS WITH STAGE 3 CHRONIC KIDNEY DISEASE, WITH LONG-TERM CURRENT USE OF INSULIN (H): Primary | ICD-10-CM

## 2018-10-03 DIAGNOSIS — N18.30 TYPE 2 DIABETES MELLITUS WITH STAGE 3 CHRONIC KIDNEY DISEASE, WITH LONG-TERM CURRENT USE OF INSULIN (H): Primary | ICD-10-CM

## 2018-10-03 DIAGNOSIS — E11.22 TYPE 2 DIABETES MELLITUS WITH STAGE 3 CHRONIC KIDNEY DISEASE, WITH LONG-TERM CURRENT USE OF INSULIN (H): Primary | ICD-10-CM

## 2018-10-03 RX ORDER — DEXAMETHASONE 4 MG/1
TABLET ORAL
Qty: 12 G | Refills: 1 | Status: SHIPPED | OUTPATIENT
Start: 2018-10-03 | End: 2019-02-04

## 2018-10-03 RX ORDER — DULOXETIN HYDROCHLORIDE 30 MG/1
CAPSULE, DELAYED RELEASE ORAL
Qty: 180 CAPSULE | Refills: 1 | Status: SHIPPED | OUTPATIENT
Start: 2018-10-03 | End: 2019-05-24

## 2018-10-05 RX ORDER — GLIMEPIRIDE 4 MG/1
TABLET ORAL
Status: SHIPPED
Start: 2018-10-05

## 2018-10-05 NOTE — TELEPHONE ENCOUNTER
"Routing refill request to provider for review/approval because:  Medication is reported/historical      Requested Prescriptions   Pending Prescriptions Disp Refills     glimepiride (AMARYL) 4 MG tablet 30 tablet 3     Sig: Take 1 tablet (4 mg) by mouth 2 times daily (before meals)    Sulfonylurea Agents Passed    10/3/2018 12:34 PM       Passed - Blood pressure less than 140/90 in past 6 months    BP Readings from Last 3 Encounters:   08/20/18 124/72   08/02/18 132/60   04/23/18 126/74                Passed - Patient has documented LDL within the past 12 mos.    Recent Labs   Lab Test  08/15/18   0924   LDL  62            Passed - Patient has had a Microalbumin in the past 12 mos.    Recent Labs   Lab Test  11/01/17   1530   MICROL  <5   UMALCR  Unable to calculate due to low value            Passed - Patient has documented A1c within the specified period of time.    If HgbA1C is 8 or greater, it needs to be on file within the past 3 months.  If less than 8, must be on file within the past 6 months.     Recent Labs   Lab Test  08/15/18   0924   A1C  7.7*            Passed - Patient is age 18 or older       Passed - Patient has a recent creatinine (normal) within the past 12 mos.    Recent Labs   Lab Test 06/04/18   CR  1.19            Passed - Recent (6 mo) or future (30 days) visit within the authorizing provider's specialty    Patient had office visit in the last 6 months or has a visit in the next 30 days with authorizing provider or within the authorizing provider's specialty.  See \"Patient Info\" tab in inbasket, or \"Choose Columns\" in Meds & Orders section of the refill encounter.            Meggan Foreman, RN - BC      "

## 2018-12-05 PROBLEM — M54.50 LUMBAGO: Status: RESOLVED | Noted: 2018-04-25 | Resolved: 2018-12-05

## 2019-02-04 DIAGNOSIS — J45.901 MODERATE ASTHMA WITH EXACERBATION, UNSPECIFIED WHETHER PERSISTENT: ICD-10-CM

## 2019-02-05 RX ORDER — DEXAMETHASONE 4 MG/1
TABLET ORAL
Qty: 12 G | Refills: 0 | Status: SHIPPED | OUTPATIENT
Start: 2019-02-05 | End: 2019-03-09

## 2019-03-15 DIAGNOSIS — N18.30 TYPE 2 DIABETES MELLITUS WITH STAGE 3 CHRONIC KIDNEY DISEASE, WITH LONG-TERM CURRENT USE OF INSULIN (H): ICD-10-CM

## 2019-03-15 DIAGNOSIS — E11.22 TYPE 2 DIABETES MELLITUS WITH STAGE 3 CHRONIC KIDNEY DISEASE, WITH LONG-TERM CURRENT USE OF INSULIN (H): ICD-10-CM

## 2019-03-15 DIAGNOSIS — Z79.4 TYPE 2 DIABETES MELLITUS WITH STAGE 3 CHRONIC KIDNEY DISEASE, WITH LONG-TERM CURRENT USE OF INSULIN (H): ICD-10-CM

## 2019-03-15 NOTE — TELEPHONE ENCOUNTER
Requested Prescriptions   Pending Prescriptions Disp Refills     vitamin B1 (VITAMIN B-1) 100 MG tablet  Last Written Prescription Date:  8/2/18  Last Fill Quantity: 100,  # refills: 0   Last office visit: 8/20/2018 with prescribing provider:  Anuj   Future Office Visit:     100 tablet 0     Sig: Take 1 tablet (100 mg) by mouth daily    There is no refill protocol information for this order

## 2019-03-18 RX ORDER — LANOLIN ALCOHOL/MO/W.PET/CERES
100 CREAM (GRAM) TOPICAL DAILY
Qty: 100 TABLET | Refills: 0 | Status: SHIPPED | OUTPATIENT
Start: 2019-03-18

## 2019-03-18 NOTE — TELEPHONE ENCOUNTER
Routing refill request to provider for review/approval because:  Drug not on the [facility] refill protocol       Meggan Foreman RN - BC

## 2019-04-15 DIAGNOSIS — J45.901 MODERATE ASTHMA WITH EXACERBATION, UNSPECIFIED WHETHER PERSISTENT: ICD-10-CM

## 2019-04-15 NOTE — TELEPHONE ENCOUNTER
"Routing refill request to provider for review/approval because:  Failed [facility] refill protocol, see below:    ACT Total Scores 5/16/2017 2/21/2018 8/2/2018   ACT TOTAL SCORE (Goal Greater than or Equal to 20) 20 12 22   In the past 12 months, how many times did you visit the emergency room for your asthma without being admitted to the hospital? 0 0 0   In the past 12 months, how many times were you hospitalized overnight because of your asthma? 0 0 0         Requested Prescriptions   Pending Prescriptions Disp Refills     FLOVENT  MCG/ACT inhaler [Pharmacy Med Name: FLOVENT  MCG ORAL INH 120INH]  Last Written Prescription Date:  3/12/19  Last Fill Quantity: 12 g,  # refills: 0   Last office visit: 8/20/2018 with prescribing provider:     Future Office Visit:   12 g 0     Sig: INHALE 2 PUFFS INTO THE LUNGS 2 TIMES DAILY       Inhaled Steroids Protocol Failed - 4/15/2019  9:23 AM        Failed - Asthma control assessment score within normal limits in last 6 months     Please review ACT score.           Failed - Recent (6 mo) or future (30 days) visit within the authorizing provider's specialty     Patient had office visit in the last 6 months or has a visit in the next 30 days with authorizing provider or within the authorizing provider's specialty.  See \"Patient Info\" tab in inbasket, or \"Choose Columns\" in Meds & Orders section of the refill encounter.            Passed - Patient is age 12 or older        Passed - Medication is active on med list        Meggan Foreman RN - BC      "

## 2019-04-16 RX ORDER — DEXAMETHASONE 4 MG/1
TABLET ORAL
Qty: 12 G | Refills: 0 | OUTPATIENT
Start: 2019-04-16

## 2019-05-01 ENCOUNTER — TRANSFERRED RECORDS (OUTPATIENT)
Dept: HEALTH INFORMATION MANAGEMENT | Facility: CLINIC | Age: 64
End: 2019-05-01

## 2019-05-24 DIAGNOSIS — F41.9 ANXIETY: ICD-10-CM

## 2019-05-24 RX ORDER — DULOXETIN HYDROCHLORIDE 30 MG/1
CAPSULE, DELAYED RELEASE ORAL
Qty: 180 CAPSULE | Refills: 0 | Status: SHIPPED | OUTPATIENT
Start: 2019-05-24

## 2019-06-14 DIAGNOSIS — I10 HYPERTENSION GOAL BP (BLOOD PRESSURE) < 140/90: ICD-10-CM

## 2019-06-14 RX ORDER — TAMSULOSIN HYDROCHLORIDE 0.4 MG/1
CAPSULE ORAL
Qty: 30 CAPSULE | Refills: 0 | Status: SHIPPED | OUTPATIENT
Start: 2019-06-14

## 2019-06-14 RX ORDER — LOSARTAN POTASSIUM 25 MG/1
TABLET ORAL
Qty: 30 TABLET | Refills: 0 | Status: SHIPPED | OUTPATIENT
Start: 2019-06-14

## 2019-06-14 NOTE — TELEPHONE ENCOUNTER
Prescription approved per [facility] Refill Protocol.  Patient due for visit for further refill.   Marisol Pandey RN

## 2019-06-17 NOTE — TELEPHONE ENCOUNTER
Call number, someone picked up and hung up. Will call again later. mobile # not in service,called home number  Dorinda George CMA on 6/17/2019 at 10:28 AM

## 2019-06-17 NOTE — TELEPHONE ENCOUNTER
Pt requesting 90 day supply. Was given #30 because needs appt- please call to schedule.  Joi Boyer RN

## 2019-06-18 NOTE — TELEPHONE ENCOUNTER
Phone # not in service. Called home number and left vm for patient to return call  Dorinda George CMA on 6/18/2019 at 10:22 AM

## 2019-06-20 RX ORDER — LOSARTAN POTASSIUM 25 MG/1
TABLET ORAL
Qty: 90 TABLET | Refills: 0
Start: 2019-06-20

## 2019-06-20 RX ORDER — TAMSULOSIN HYDROCHLORIDE 0.4 MG/1
CAPSULE ORAL
Qty: 90 CAPSULE | Refills: 0
Start: 2019-06-20

## 2019-06-20 NOTE — TELEPHONE ENCOUNTER
3rd attempt. Phone # not in service. Called home number and left vm for patient to return call. Please close encounter after removing unsigned orders  Dorinda George CMA on 6/20/2019 at 8:14 AM

## 2019-07-11 DIAGNOSIS — I10 HYPERTENSION GOAL BP (BLOOD PRESSURE) < 140/90: ICD-10-CM

## 2019-07-13 NOTE — TELEPHONE ENCOUNTER
Patient due for appointment. Please call and schedule then route back to RN pool.     Ruth Ann Germain RN

## 2019-07-15 NOTE — TELEPHONE ENCOUNTER
Called patient mobile number but the number is not in service, VM left on home phone to return call regarding below message.  JANELL Moise

## 2019-07-17 NOTE — TELEPHONE ENCOUNTER
Called patient and spoke to male who answered phone and said he was in the restroom.  He will relay message to Noor to schedule appointment. Ana Luisa Lauren,

## 2019-07-19 RX ORDER — TAMSULOSIN HYDROCHLORIDE 0.4 MG/1
CAPSULE ORAL
Qty: 30 CAPSULE | Refills: 0 | OUTPATIENT
Start: 2019-07-19

## 2019-07-19 RX ORDER — LOSARTAN POTASSIUM 25 MG/1
TABLET ORAL
Qty: 30 TABLET | Refills: 0 | OUTPATIENT
Start: 2019-07-19

## 2019-07-19 NOTE — TELEPHONE ENCOUNTER
3rd attempt. Called patient and left VM on home to return call. Please close encounter due to many attempts to reach patient  Dorinda George CMA on 7/19/2019 at 9:33 AM

## 2019-09-11 DIAGNOSIS — I10 HYPERTENSION GOAL BP (BLOOD PRESSURE) < 140/90: ICD-10-CM

## 2019-09-11 NOTE — TELEPHONE ENCOUNTER
Patient is due for office visit for further refills. Patient will call back to schedule for BP check. JANELL Moise

## 2019-09-12 NOTE — TELEPHONE ENCOUNTER
Called patient with  phone and left VM to return call to pink team  Dorinda George CMA on 9/12/2019 at 8:26 AM

## 2019-09-13 NOTE — TELEPHONE ENCOUNTER
2nd attempt. Called patient with  line and left VM to return call to pink team  Dorinda George CMA on 9/13/2019 at 11:28 AM

## 2019-09-16 RX ORDER — LOSARTAN POTASSIUM 25 MG/1
TABLET ORAL
Qty: 30 TABLET | Refills: 0 | OUTPATIENT
Start: 2019-09-16

## 2019-09-16 RX ORDER — TAMSULOSIN HYDROCHLORIDE 0.4 MG/1
CAPSULE ORAL
Qty: 30 CAPSULE | Refills: 0 | OUTPATIENT
Start: 2019-09-16

## 2019-09-16 NOTE — TELEPHONE ENCOUNTER
3rd attempt. Called patient with  line and left VM to return call to pink team. Please close chart due to many attempts  Dorinda George CMA on 9/16/2019 at 9:19 AM

## 2019-10-19 DIAGNOSIS — E11.22 TYPE 2 DIABETES MELLITUS WITH STAGE 3 CHRONIC KIDNEY DISEASE, WITH LONG-TERM CURRENT USE OF INSULIN (H): ICD-10-CM

## 2019-10-19 DIAGNOSIS — N18.30 TYPE 2 DIABETES MELLITUS WITH STAGE 3 CHRONIC KIDNEY DISEASE, WITH LONG-TERM CURRENT USE OF INSULIN (H): ICD-10-CM

## 2019-10-19 DIAGNOSIS — Z79.4 TYPE 2 DIABETES MELLITUS WITH STAGE 3 CHRONIC KIDNEY DISEASE, WITH LONG-TERM CURRENT USE OF INSULIN (H): ICD-10-CM

## 2019-10-21 RX ORDER — THIAMINE MONONITRATE (VIT B1) 100 MG
TABLET ORAL
Qty: 100 TABLET | Refills: 0 | OUTPATIENT
Start: 2019-10-21

## 2019-10-21 NOTE — TELEPHONE ENCOUNTER
Requested Prescriptions   Pending Prescriptions Disp Refills     VITAMIN B-1 100 MG tablet [Pharmacy Med Name: VITAMIN B-1 100MG TABLETS] 100 tablet 0     Sig: TAKE 1 TABLET(100 MG) BY MOUTH DAILY       There is no refill protocol information for this order        Routing refill request to provider for review/approval because:  Drug not on the [facility] refill protocol       Nandini Ambriz RN

## 2019-10-29 ENCOUNTER — APPOINTMENT (OUTPATIENT)
Dept: OPTOMETRY | Facility: CLINIC | Age: 64
End: 2019-10-29
Payer: MEDICARE

## 2019-10-29 PROCEDURE — 92341 FIT SPECTACLES BIFOCAL: CPT | Performed by: OPHTHALMOLOGY

## 2020-02-11 ENCOUNTER — TRANSFERRED RECORDS (OUTPATIENT)
Dept: HEALTH INFORMATION MANAGEMENT | Facility: CLINIC | Age: 65
End: 2020-02-11

## 2020-03-15 DIAGNOSIS — J45.901 MODERATE ASTHMA WITH EXACERBATION, UNSPECIFIED WHETHER PERSISTENT: ICD-10-CM

## 2020-03-16 NOTE — TELEPHONE ENCOUNTER
fluticasone propionate (FLOVENT HFA) 110 mcg/Actuation inhaler       Last Written Prescription Date:  5-4-2019  Last Fill Quantity: 0,   # refills: 0  Last Office Visit: 10-2-2019  Future Office visit:       Routing refill request to provider for review/approval because:  Drug not active on patient's medication list

## 2020-03-17 NOTE — TELEPHONE ENCOUNTER
"Routing refill request to provider for review/approval because:  Patient needs to be seen because it has been more than 1 year since last office visit. Last office visit 8/20/18  No ACT in > 1 year    Requested Prescriptions   Pending Prescriptions Disp Refills     FLOVENT  MCG/ACT inhaler [Pharmacy Med Name: FLOVENT  MCG ORAL INH 120INH] 12 g 0     Sig: INHALE 2 PUFFS INTO THE LUNGS 2 TIMES DAILY       Inhaled Steroids Protocol Failed - 3/16/2020  9:53 AM        Failed - Asthma control assessment score within normal limits in last 6 months     Please review ACT score.           Failed - Recent (6 mo) or future (30 days) visit within the authorizing provider's specialty     Patient had office visit in the last 6 months or has a visit in the next 30 days with authorizing provider or within the authorizing provider's specialty.  See \"Patient Info\" tab in inbasket, or \"Choose Columns\" in Meds & Orders section of the refill encounter.            Passed - Patient is age 12 or older        Passed - Medication is active on med list           Monica Copeland RN  [facility]          "

## 2020-03-18 RX ORDER — DEXAMETHASONE 4 MG/1
TABLET ORAL
Qty: 12 G | Refills: 0 | OUTPATIENT
Start: 2020-03-18

## 2020-03-18 NOTE — TELEPHONE ENCOUNTER
Called pharmacy but was on hold for over 10 minutes. Please try at later time.  Monica ROBERTO CMA ([facility])

## 2020-03-18 NOTE — TELEPHONE ENCOUNTER
Are you requesting patient come in for appointment?  Monica ROBERTO CMA ([facility])

## 2020-07-09 DIAGNOSIS — J45.901 MODERATE ASTHMA WITH EXACERBATION, UNSPECIFIED WHETHER PERSISTENT: ICD-10-CM

## 2020-07-09 NOTE — LETTER
July 15, 2020      Sandra [place]  [address] [id number]        Dear Sandra,       Your provider has denied a refill for FLOVENT  MCG/ACT inhaler. You are due for an appointment for further refills. Please contact the clinic to schedule an appointment for further refills.       Sincerely,        Deborah Leslie MD

## 2020-07-09 NOTE — TELEPHONE ENCOUNTER
Please call patient. Is he still being seen by [facility] or by [facility] now? Will need to request refill from [facility].    Ruth Ann Phoenix RN

## 2020-07-13 NOTE — TELEPHONE ENCOUNTER
Called patient's mobile and not in service. Called son and left VM to return call to see if patient is still coming to [place]  Dorinda George CMA on 7/13/2020 at 1:58 PM

## 2020-07-14 NOTE — TELEPHONE ENCOUNTER
2nd attempt. Called son and left VM to return call to see if patient is still coming to [place]  Dorinda George CMA on 7/14/2020 at 8:46 AM

## 2020-07-15 RX ORDER — DEXAMETHASONE 4 MG/1
TABLET ORAL
Qty: 12 G | Refills: 0 | OUTPATIENT
Start: 2020-07-15

## 2020-07-15 NOTE — TELEPHONE ENCOUNTER
Mobile number is not in service. 2 previous attempts have been made. Can medication be removed and letter sent?

## 2020-07-21 NOTE — TELEPHONE ENCOUNTER
pt stated she will like to talk to you about her medication. Pt stated that you had prescribed her Tramadol 50 mg every 8 hrs as needed for pain. She stated that this was not working for her.  So her  started to give her 2 tablet every 8 hour If his Kidney Physician suggest he take High dose-he should discuss with Them

## 2020-12-24 NOTE — MR AVS SNAPSHOT
"              After Visit Summary   2/14/2017    Sandra Dos Santos    MRN: [id number]           Patient Information     Date Of Birth          1955        Visit Information        Provider Department      2/14/2017 11:20 AM Chavez Velez PT [facility] [place] [facility]        Today's Diagnoses     Lumbar radiculopathy           Follow-ups after your visit        Your next 10 appointments already scheduled     Feb 23, 2017  1:55 PM CST   [facility] with Chavez Velez PT   [facility] [place] [facility] ([facility] [place])    [id number]  [address]  [place] [address]   [phone]            Mar 22, 2017  9:30 AM CDT   Return Visit with Simon Andrade MD   [facility] [place] ([facility] [place])    [id number]  [place] [address]   [phone]              Who to contact     If you have questions or need follow up information about today's clinic visit or your schedule please contact [facility] [place] [facility] directly at [phone].  Normal or non-critical lab and imaging results will be communicated to you by MyChart, letter or phone within 4 business days after the clinic has received the results. If you do not hear from us within 7 days, please contact the clinic through [employer] or phone. If you have a critical or abnormal lab result, we will notify you by phone as soon as possible.  Submit refill requests through [employer] or call your pharmacy and they will forward the refill request to us. Please allow 3 business days for your refill to be completed.          Additional Information About Your Visit        MyChart Information     liveBooks lets you send messages to your doctor, view your test results, renew your prescriptions, schedule appointments and more. To sign up, go to [email or web address] . Click on \"Log in\" on the left side of the screen, which will take you to the Welcome page. Then click on \"Sign up " "Now\" on the right side of the page.     You will be asked to enter the access code listed below, as well as some personal information. Please follow the directions to create your username and password.     Your access code is: [email or web address]  Expires: 2017  1:53 PM     Your access code will  in 90 days. If you need help or a new code, please call your [facility] clinic or [phone].        Care EveryWhere ID     This is your Care EveryWhere ID. This could be used by other organizations to access your [facility] medical records  [id number]         Blood Pressure from Last 3 Encounters:   17 132/70   01/10/17 126/72   17 136/72    Weight from Last 3 Encounters:   17 114.8 kg (253 lb)   17 117 kg (258 lb)   16 115.2 kg (254 lb)              We Performed the Following     Therapeutic Exercises        Primary Care Provider    Physician No Ref-Primary       No address on file        Thank you!     Thank you for choosing [facility]   for your care. Our goal is always to provide you with excellent care. Hearing back from our patients is one way we can continue to improve our services. Please take a few minutes to complete the written survey that you may receive in the mail after your visit with us. Thank you!             Your Updated Medication List - Protect others around you: Learn how to safely use, store and throw away your medicines at www.disposemymeds.org.          This list is accurate as of: 17 12:17 PM.  Always use your most recent med list.                   Brand Name Dispense Instructions for use    ACCU-CHEK ULISES PLUS test strip   Generic drug:  blood glucose monitoring          albuterol 108 (90 BASE) MCG/ACT Inhaler    PROAIR HFA/PROVENTIL HFA/VENTOLIN HFA    1 Inhaler    Inhale 2 puffs into the lungs every 4 hours as needed for shortness of breath / dyspnea or wheezing       atorvastatin 10 MG tablet    LIPITOR    90 tablet    Take 1 " tablet (10 mg) by mouth daily       cholecalciferol 5000 UNITS Tabs tablet    vitamin D3     Take by mouth daily       * cyclobenzaprine 10 MG tablet    FLEXERIL    30 tablet    Take 0.5-1 tablets (5-10 mg) by mouth 3 times daily as needed for muscle spasms       * cyclobenzaprine 10 MG tablet    FLEXERIL    30 tablet    Take 0.5-1 tablets (5-10 mg) by mouth 3 times daily as needed for muscle spasms       glimepiride 2 MG tablet    AMARYL    180 tablet    Take 1 tablet (2 mg) by mouth 2 times daily With food       GLOBAL EASE INJECT PEN NEEDLES 31G X 5 MM   Generic drug:  insulin pen needle          insulin glargine U-300 300 UNIT/ML injection    TOUJEO SOLOSTAR    3 Month    Inject 14 Units Subcutaneous At Bedtime       INVOKANA 100 MG tablet   Generic drug:  canagliflozin     90 tablet    Take 1 tablet (100 mg) by mouth every morning (before breakfast)       lidocaine-prilocaine cream    EMLA    5800 g    Apply topically as needed for moderate pain       losartan 25 MG tablet    COZAAR    90 tablet    Take 1 tablet (25 mg) by mouth daily       order for DME     400 each    Test strips for pt's glucometer, brand as covered by insurance. Test four times daily and prn.       pregabalin 150 MG capsule    LYRICA    60 capsule    Take 1 capsule (150 mg) by mouth 2 times daily       ranitidine 150 MG tablet    ZANTAC    180 tablet    Take 1 tablet (150 mg) by mouth 2 times daily       tamsulosin 0.4 MG capsule    FLOMAX    90 capsule    Take 1 capsule (0.4 mg) by mouth daily       thiamine 100 MG tablet      Take 100 mg by mouth       traMADol 50 MG tablet    ULTRAM    120 tablet    Take 1-2 tablets ( mg) by mouth every 6 hours as needed for pain maximum 4 tablet(s) per day       * Notice:  This list has 2 medication(s) that are the same as other medications prescribed for you. Read the directions carefully, and ask your doctor or other care provider to review them with you.       Self

## 2021-08-16 ENCOUNTER — TRANSFERRED RECORDS (OUTPATIENT)
Dept: HEALTH INFORMATION MANAGEMENT | Facility: CLINIC | Age: 66
End: 2021-08-16

## 2022-08-29 NOTE — NURSING NOTE
"Chief Complaint   Patient presents with     Diabetes       Initial Pulse 85  Temp 98.5  F (36.9  C) (Oral)  Resp 26  Ht 5' 9.5\" (1.765 m)  Wt 248 lb (112.5 kg)  SpO2 96%  BMI 36.1 kg/m2 Estimated body mass index is 36.1 kg/(m^2) as calculated from the following:    Height as of this encounter: 5' 9.5\" (1.765 m).    Weight as of this encounter: 248 lb (112.5 kg).  Medication Reconciliation: complete    " none

## 2022-10-04 PROBLEM — E11.22 TYPE 2 DIABETES MELLITUS WITH DIABETIC CHRONIC KIDNEY DISEASE (H): Status: ACTIVE | Noted: 2017-08-01
